# Patient Record
Sex: MALE | NOT HISPANIC OR LATINO | URBAN - METROPOLITAN AREA
[De-identification: names, ages, dates, MRNs, and addresses within clinical notes are randomized per-mention and may not be internally consistent; named-entity substitution may affect disease eponyms.]

---

## 2018-09-27 ENCOUNTER — INPATIENT (INPATIENT)
Facility: HOSPITAL | Age: 53
LOS: 6 days | Discharge: HOME HEALTH | DRG: 236 | End: 2018-10-04
Attending: THORACIC SURGERY (CARDIOTHORACIC VASCULAR SURGERY) | Admitting: THORACIC SURGERY (CARDIOTHORACIC VASCULAR SURGERY)
Payer: COMMERCIAL

## 2018-09-27 VITALS
WEIGHT: 164.24 LBS | HEART RATE: 92 BPM | RESPIRATION RATE: 18 BRPM | DIASTOLIC BLOOD PRESSURE: 74 MMHG | OXYGEN SATURATION: 99 % | SYSTOLIC BLOOD PRESSURE: 144 MMHG | HEIGHT: 66 IN

## 2018-09-27 DIAGNOSIS — E11.9 TYPE 2 DIABETES MELLITUS WITHOUT COMPLICATIONS: ICD-10-CM

## 2018-09-27 DIAGNOSIS — I10 ESSENTIAL (PRIMARY) HYPERTENSION: ICD-10-CM

## 2018-09-27 DIAGNOSIS — Z98.890 OTHER SPECIFIED POSTPROCEDURAL STATES: Chronic | ICD-10-CM

## 2018-09-27 DIAGNOSIS — J44.9 CHRONIC OBSTRUCTIVE PULMONARY DISEASE, UNSPECIFIED: ICD-10-CM

## 2018-09-27 DIAGNOSIS — I25.10 ATHEROSCLEROTIC HEART DISEASE OF NATIVE CORONARY ARTERY WITHOUT ANGINA PECTORIS: ICD-10-CM

## 2018-09-27 DIAGNOSIS — E78.5 HYPERLIPIDEMIA, UNSPECIFIED: ICD-10-CM

## 2018-09-27 LAB
ALBUMIN SERPL ELPH-MCNC: 3.7 G/DL — SIGNIFICANT CHANGE UP (ref 3.3–5)
ALP SERPL-CCNC: 73 U/L — SIGNIFICANT CHANGE UP (ref 40–120)
ALT FLD-CCNC: 13 U/L — SIGNIFICANT CHANGE UP (ref 10–45)
ANION GAP SERPL CALC-SCNC: 10 MMOL/L — SIGNIFICANT CHANGE UP (ref 5–17)
AST SERPL-CCNC: 10 U/L — SIGNIFICANT CHANGE UP (ref 10–40)
BILIRUB SERPL-MCNC: 0.3 MG/DL — SIGNIFICANT CHANGE UP (ref 0.2–1.2)
BUN SERPL-MCNC: 21 MG/DL — SIGNIFICANT CHANGE UP (ref 7–23)
CALCIUM SERPL-MCNC: 10.4 MG/DL — SIGNIFICANT CHANGE UP (ref 8.4–10.5)
CHLORIDE SERPL-SCNC: 94 MMOL/L — LOW (ref 96–108)
CK MB CFR SERPL CALC: 4.4 NG/ML — SIGNIFICANT CHANGE UP (ref 0–6.7)
CK SERPL-CCNC: 112 U/L — SIGNIFICANT CHANGE UP (ref 30–200)
CO2 SERPL-SCNC: 30 MMOL/L — SIGNIFICANT CHANGE UP (ref 22–31)
CREAT SERPL-MCNC: 1.05 MG/DL — SIGNIFICANT CHANGE UP (ref 0.5–1.3)
GLUCOSE SERPL-MCNC: 299 MG/DL — HIGH (ref 70–99)
HCT VFR BLD CALC: 44.5 % — SIGNIFICANT CHANGE UP (ref 39–50)
HGB BLD-MCNC: 14.4 G/DL — SIGNIFICANT CHANGE UP (ref 13–17)
MAGNESIUM SERPL-MCNC: 1.7 MG/DL — SIGNIFICANT CHANGE UP (ref 1.6–2.6)
MCHC RBC-ENTMCNC: 28.2 PG — SIGNIFICANT CHANGE UP (ref 27–34)
MCHC RBC-ENTMCNC: 32.4 G/DL — SIGNIFICANT CHANGE UP (ref 32–36)
MCV RBC AUTO: 87.3 FL — SIGNIFICANT CHANGE UP (ref 80–100)
NT-PROBNP SERPL-SCNC: 1423 PG/ML — HIGH (ref 0–300)
PLATELET # BLD AUTO: 339 K/UL — SIGNIFICANT CHANGE UP (ref 150–400)
POTASSIUM SERPL-MCNC: 4.1 MMOL/L — SIGNIFICANT CHANGE UP (ref 3.5–5.3)
POTASSIUM SERPL-SCNC: 4.1 MMOL/L — SIGNIFICANT CHANGE UP (ref 3.5–5.3)
PROT SERPL-MCNC: 7.5 G/DL — SIGNIFICANT CHANGE UP (ref 6–8.3)
RBC # BLD: 5.1 M/UL — SIGNIFICANT CHANGE UP (ref 4.2–5.8)
RBC # FLD: 12.9 % — SIGNIFICANT CHANGE UP (ref 10.3–16.9)
SODIUM SERPL-SCNC: 134 MMOL/L — LOW (ref 135–145)
WBC # BLD: 10.2 K/UL — SIGNIFICANT CHANGE UP (ref 3.8–10.5)
WBC # FLD AUTO: 10.2 K/UL — SIGNIFICANT CHANGE UP (ref 3.8–10.5)

## 2018-09-27 RX ORDER — DEXTROSE 50 % IN WATER 50 %
25 SYRINGE (ML) INTRAVENOUS ONCE
Qty: 0 | Refills: 0 | Status: DISCONTINUED | OUTPATIENT
Start: 2018-09-27 | End: 2018-10-01

## 2018-09-27 RX ORDER — INSULIN GLARGINE 100 [IU]/ML
20 INJECTION, SOLUTION SUBCUTANEOUS AT BEDTIME
Qty: 0 | Refills: 0 | Status: DISCONTINUED | OUTPATIENT
Start: 2018-09-27 | End: 2018-10-01

## 2018-09-27 RX ORDER — DEXTROSE 50 % IN WATER 50 %
12.5 SYRINGE (ML) INTRAVENOUS ONCE
Qty: 0 | Refills: 0 | Status: DISCONTINUED | OUTPATIENT
Start: 2018-09-27 | End: 2018-10-01

## 2018-09-27 RX ORDER — MAGNESIUM SULFATE 500 MG/ML
2 VIAL (ML) INJECTION ONCE
Qty: 0 | Refills: 0 | Status: COMPLETED | OUTPATIENT
Start: 2018-09-27 | End: 2018-09-28

## 2018-09-27 RX ORDER — BUDESONIDE AND FORMOTEROL FUMARATE DIHYDRATE 160; 4.5 UG/1; UG/1
2 AEROSOL RESPIRATORY (INHALATION)
Qty: 0 | Refills: 0 | Status: DISCONTINUED | OUTPATIENT
Start: 2018-09-27 | End: 2018-10-04

## 2018-09-27 RX ORDER — METOPROLOL TARTRATE 50 MG
25 TABLET ORAL
Qty: 0 | Refills: 0 | Status: DISCONTINUED | OUTPATIENT
Start: 2018-09-27 | End: 2018-10-01

## 2018-09-27 RX ORDER — INFLUENZA VIRUS VACCINE 15; 15; 15; 15 UG/.5ML; UG/.5ML; UG/.5ML; UG/.5ML
0.5 SUSPENSION INTRAMUSCULAR ONCE
Qty: 0 | Refills: 0 | Status: COMPLETED | OUTPATIENT
Start: 2018-09-27 | End: 2018-10-04

## 2018-09-27 RX ORDER — GLUCAGON INJECTION, SOLUTION 0.5 MG/.1ML
1 INJECTION, SOLUTION SUBCUTANEOUS ONCE
Qty: 0 | Refills: 0 | Status: DISCONTINUED | OUTPATIENT
Start: 2018-09-27 | End: 2018-10-01

## 2018-09-27 RX ORDER — ALBUTEROL 90 UG/1
2.5 AEROSOL, METERED ORAL EVERY 6 HOURS
Qty: 0 | Refills: 0 | Status: DISCONTINUED | OUTPATIENT
Start: 2018-09-27 | End: 2018-09-28

## 2018-09-27 RX ORDER — ALBUTEROL 90 UG/1
3 AEROSOL, METERED ORAL
Qty: 0 | Refills: 0 | COMMUNITY

## 2018-09-27 RX ORDER — SODIUM CHLORIDE 9 MG/ML
1000 INJECTION, SOLUTION INTRAVENOUS
Qty: 0 | Refills: 0 | Status: DISCONTINUED | OUTPATIENT
Start: 2018-09-27 | End: 2018-10-01

## 2018-09-27 RX ORDER — INSULIN LISPRO 100/ML
VIAL (ML) SUBCUTANEOUS
Qty: 0 | Refills: 0 | Status: DISCONTINUED | OUTPATIENT
Start: 2018-09-27 | End: 2018-10-01

## 2018-09-27 RX ORDER — EFINACONAZOLE 100 MG/ML
1 SOLUTION TOPICAL
Qty: 0 | Refills: 0 | COMMUNITY

## 2018-09-27 RX ORDER — HEPARIN SODIUM 5000 [USP'U]/ML
5000 INJECTION INTRAVENOUS; SUBCUTANEOUS EVERY 8 HOURS
Qty: 0 | Refills: 0 | Status: DISCONTINUED | OUTPATIENT
Start: 2018-09-27 | End: 2018-10-01

## 2018-09-27 RX ORDER — CLOPIDOGREL BISULFATE 75 MG/1
75 TABLET, FILM COATED ORAL DAILY
Qty: 0 | Refills: 0 | Status: DISCONTINUED | OUTPATIENT
Start: 2018-09-28 | End: 2018-09-28

## 2018-09-27 RX ORDER — ASPIRIN/CALCIUM CARB/MAGNESIUM 324 MG
81 TABLET ORAL DAILY
Qty: 0 | Refills: 0 | Status: DISCONTINUED | OUTPATIENT
Start: 2018-09-28 | End: 2018-10-01

## 2018-09-27 RX ORDER — INSULIN LISPRO 100/ML
3 VIAL (ML) SUBCUTANEOUS
Qty: 0 | Refills: 0 | Status: DISCONTINUED | OUTPATIENT
Start: 2018-09-27 | End: 2018-10-01

## 2018-09-27 RX ORDER — SODIUM CHLORIDE 9 MG/ML
1000 INJECTION INTRAMUSCULAR; INTRAVENOUS; SUBCUTANEOUS
Qty: 0 | Refills: 0 | Status: DISCONTINUED | OUTPATIENT
Start: 2018-09-28 | End: 2018-09-28

## 2018-09-27 RX ORDER — PREGABALIN 225 MG/1
1 CAPSULE ORAL
Qty: 0 | Refills: 0 | COMMUNITY

## 2018-09-27 RX ORDER — ATORVASTATIN CALCIUM 80 MG/1
40 TABLET, FILM COATED ORAL AT BEDTIME
Qty: 0 | Refills: 0 | Status: DISCONTINUED | OUTPATIENT
Start: 2018-09-27 | End: 2018-09-28

## 2018-09-27 RX ORDER — ERGOCALCIFEROL 1.25 MG/1
1 CAPSULE ORAL
Qty: 0 | Refills: 0 | COMMUNITY

## 2018-09-27 RX ORDER — DEXTROSE 50 % IN WATER 50 %
15 SYRINGE (ML) INTRAVENOUS ONCE
Qty: 0 | Refills: 0 | Status: DISCONTINUED | OUTPATIENT
Start: 2018-09-27 | End: 2018-10-01

## 2018-09-27 RX ADMIN — HEPARIN SODIUM 5000 UNIT(S): 5000 INJECTION INTRAVENOUS; SUBCUTANEOUS at 22:02

## 2018-09-27 RX ADMIN — Medication 25 MILLIGRAM(S): at 22:17

## 2018-09-27 RX ADMIN — Medication 4: at 22:02

## 2018-09-27 RX ADMIN — BUDESONIDE AND FORMOTEROL FUMARATE DIHYDRATE 2 PUFF(S): 160; 4.5 AEROSOL RESPIRATORY (INHALATION) at 22:17

## 2018-09-27 RX ADMIN — INSULIN GLARGINE 20 UNIT(S): 100 INJECTION, SOLUTION SUBCUTANEOUS at 22:15

## 2018-09-27 NOTE — H&P ADULT - NS MD HP PULSE CAROTID
The patient has been examined and the H&P has been reviewed:    I concur with the findings and no changes have occurred since H&P was written.    Anesthesia/Surgery risks, benefits and alternative options discussed and understood by patient/family.          Active Hospital Problems    Diagnosis  POA    Mass of thigh, right [R22.41]  Yes      Resolved Hospital Problems    Diagnosis Date Resolved POA   No resolved problems to display.      2+ b/l no bruits

## 2018-09-27 NOTE — H&P ADULT - RS GEN PE MLT RESP DETAILS PC
diminished breath sounds, R/respirations non-labored/wheezes/diminished breath sounds, L/no rhonchi/no rales

## 2018-09-27 NOTE — H&P ADULT - FAMILY HISTORY
Father  Still living? Unknown  Family history of acute myocardial infarction, Age at diagnosis: Age Unknown     Grandparent  Still living? Unknown  Family history of acute myocardial infarction, Age at diagnosis: Age Unknown     Sibling  Still living? Unknown  Family history of CABG, Age at diagnosis: Age Unknown

## 2018-09-27 NOTE — H&P ADULT - PMH
CAD (coronary artery disease)    COPD (chronic obstructive pulmonary disease)    Diabetes    HLD (hyperlipidemia)    HTN (hypertension)

## 2018-09-27 NOTE — H&P ADULT - PROBLEM SELECTOR PLAN 5
- SpO2 99% on RA on admission  - Continue Albuterol Neb Q6hr PRN for SOB/wheezing and Symbicort 2 puffs BID    DVT ppx: Heparin SubQ  Dispo: NPO for staged PCI in AM with Dr. Corrales

## 2018-09-27 NOTE — H&P ADULT - HISTORY OF PRESENT ILLNESS
52 y/o M with PMHx of COPD, Bronchiectasis, h/o MAC, HTN, DM, HTN who was initially referred to  for cardiac cath due to abnormal stress test, which as per transfer notes from , showed small amount of ischemia in the apical region. Pt is now s/p diagnostic cardiac cath at  on 9/27/18 revealing significant 3VCAD: ostial LAD: eccentric tubular 75% stenosis, prox LAD: eccentric tubular 40% stenosis, prox Ramus: eccentric tubular 70% stenosis, prox RCA: tubular 20% stenosis, mid RCA: eccentric and calcified tubular 95% stenosis for which attempted PCI was unsuccessful due to heavy calcification at the RCA ostium, severely elevated EDP, diaphragmatic and posterobasal hypokinesis, EF 45%. Pt was deemed medically stable for transfer to [facility] on 9/27/18 with plan for staged PCI on 9/28/18 with Dr. Corrales. As per d/w Dr. Corrales, pt was already loaded with ASA/Plavix prior to procedure at  and will receive ASA 81mg and Plavix 75mg on the morning of 9/28/18 prior to staged PCI.  Upon arrival to [facility], VS noted as 144/74, HR 92, RR 18, SpO2 99% on RA. 52 y/o M with strong FamHx of CAD/MI (4 grandparents  from [place], father  from [place], sister s/p CABG) and PMHx of COPD, Bronchiectasis, h/o MAC, HTN, DM, HTN who was initially referred to  for cardiac cath due to abnormal stress test on 18, which as per transfer notes from , showed small amount of ischemia in the apical region. He states his PCP, referred him for stress test (unsure why though per pt) which was abnormal prompting him to proceed with elective cath at . Pt is now s/p diagnostic cardiac cath at  on 18 revealing significant 3VCAD: ostial LAD: eccentric tubular 75% stenosis, prox LAD: eccentric tubular 40% stenosis, prox Ramus: eccentric tubular 70% stenosis, prox RCA: tubular 20% stenosis, mid RCA: eccentric and calcified tubular 95% stenosis for which attempted PCI was unsuccessful due to heavy calcification at the RCA ostium, severely elevated EDP, diaphragmatic and posterobasal hypokinesis, EF 45%. Pt was deemed medically stable for transfer to [facility] on 18 with plan for staged PCI on 18 with Dr. Corrales. Upon arrival to [facility], VS noted as 144/74, HR 92, RR 18, SpO2 99% on RA. EKG shows SR at 95BPM with RBBB (known), with no acute changes noted. On interview with Prasanth HOGAN, pt endorses CID with walking up 2 flights of stairs which he attributes to his COPD. Pt denies any HA, dizziness, syncope, CP, palpitations, SOB at rest, n/v/d, diaphoresis, dysuria, fevers, chills, melena, LE edema, or any other symptoms at this time. As per d/w Dr. Corrales, pt was already loaded with ASA/Plavix prior to procedure at  and will receive ASA 81mg and Plavix 75mg on the morning of 18 prior to staged PCI.

## 2018-09-27 NOTE — H&P ADULT - PROBLEM SELECTOR PLAN 4
- Holding home oral hypoglycemics  - Lantus 20 units at bedtime and Humalog 3 units TID with meals  - FS/ISS  - F/u HgbA1c in AM

## 2018-09-27 NOTE — H&P ADULT - ASSESSMENT
54 y/o M with PMHx of COPD, Bronchiectasis, h/o MAC, HTN, DM, HTN who was transferred from  for staged PCI on 9/28/18 with Dr. Corrales 54 y/o M with PMHx of COPD, Bronchiectasis, h/o MAC, HTN, DM, HTN who was transferred from  for staged PCI on 9/28/18 with Dr. Corrales. 54 y/o M with strong FamHx of CAD/MI (4 grandparents  from [place], father  from [place], sister s/p CABG) and PMHx of COPD, Bronchiectasis, h/o MAC, HTN, DM, HTN, with abnormal stress test, who is s/p diagnostic cardiac cath at  on 18 revealing significant 3VCAD with unsuccessful PCI due to heavy calcification at the RCA ostium, now transferred to [facility] for staged PCI on 18 with Dr. Corrales.

## 2018-09-27 NOTE — H&P ADULT - PROBLEM SELECTOR PLAN 2
- Pt transferred on Lipitor 20mg QD. Increased to Lipitor 40mg tonight.  - F/u Lipid Panel and LFTs in AM to possibly increase Lipitor to 80mg after cath.

## 2018-09-27 NOTE — H&P ADULT - PROBLEM SELECTOR PLAN 1
- CP free  - s/p diagnostic cath at  on 9/27/18: 3VCAD: ostial LAD: eccentric tubular 75% stenosis, prox LAD: eccentric tubular 40% stenosis, prox Ramus: eccentric tubular 70% stenosis, prox RCA: tubular 20% stenosis, mid RCA: eccentric and calcified tubular 95% stenosis for which attempted PCI was unsuccessful due to heavy calcification at the RCA ostium, severely elevated EDP, diaphragmatic and posterobasal hypokinesis, EF 45%  - EKG at [facility] shows SR at 95BPM with RBBB (known), with no acute changes noted  - Per d/w Dr. Corrales, pt was loaded with ASA/Plavix at . Ordered for ASA 81mg and Plavix 75mg in AM  - NPO at midnight for staged PCI in AM with Dr. Corrales  - Precath/consent in chart  - Risks & benefits of procedure and alternative therapy have been explained to the patient including but not limited to: allergic reaction, bleeding w/possible need for blood transfusion, infection, renal and vascular compromise, limb damage, arrhythmia, stroke, vessel dissection/perforation, Myocardial infarction, emergent CABG. Informed consent obtained and in chart.  - Echo ordered

## 2018-09-27 NOTE — H&P ADULT - SKIN
detailed exam scaling/Generalized hypo and hyperpigmentation of skin with scaling noted on back, hips, sacrum, groin, and legs (L>R)

## 2018-09-28 DIAGNOSIS — I50.20 UNSPECIFIED SYSTOLIC (CONGESTIVE) HEART FAILURE: ICD-10-CM

## 2018-09-28 PROBLEM — Z00.00 ENCOUNTER FOR PREVENTIVE HEALTH EXAMINATION: Status: ACTIVE | Noted: 2018-09-28

## 2018-09-28 LAB
ANION GAP SERPL CALC-SCNC: 9 MMOL/L — SIGNIFICANT CHANGE UP (ref 5–17)
APTT BLD: 30.5 SEC — SIGNIFICANT CHANGE UP (ref 27.5–37.4)
BASOPHILS NFR BLD AUTO: 0.2 % — SIGNIFICANT CHANGE UP (ref 0–2)
BUN SERPL-MCNC: 18 MG/DL — SIGNIFICANT CHANGE UP (ref 7–23)
CALCIUM SERPL-MCNC: 10.1 MG/DL — SIGNIFICANT CHANGE UP (ref 8.4–10.5)
CHLORIDE SERPL-SCNC: 94 MMOL/L — LOW (ref 96–108)
CHOLEST SERPL-MCNC: 124 MG/DL — SIGNIFICANT CHANGE UP (ref 10–199)
CK MB CFR SERPL CALC: 4.6 NG/ML — SIGNIFICANT CHANGE UP (ref 0–6.7)
CK SERPL-CCNC: 117 U/L — SIGNIFICANT CHANGE UP (ref 30–200)
CO2 SERPL-SCNC: 31 MMOL/L — SIGNIFICANT CHANGE UP (ref 22–31)
CREAT SERPL-MCNC: 0.83 MG/DL — SIGNIFICANT CHANGE UP (ref 0.5–1.3)
EOSINOPHIL NFR BLD AUTO: 2.6 % — SIGNIFICANT CHANGE UP (ref 0–6)
GLUCOSE SERPL-MCNC: 181 MG/DL — HIGH (ref 70–99)
HBA1C BLD-MCNC: 8.8 % — HIGH (ref 4–5.6)
HCT VFR BLD CALC: 47.1 % — SIGNIFICANT CHANGE UP (ref 39–50)
HDLC SERPL-MCNC: 58 MG/DL — SIGNIFICANT CHANGE UP
HGB BLD-MCNC: 15.3 G/DL — SIGNIFICANT CHANGE UP (ref 13–17)
INR BLD: 1.04 — SIGNIFICANT CHANGE UP (ref 0.88–1.16)
LIPID PNL WITH DIRECT LDL SERPL: 53 MG/DL — SIGNIFICANT CHANGE UP
LYMPHOCYTES # BLD AUTO: 14.4 % — SIGNIFICANT CHANGE UP (ref 13–44)
MAGNESIUM SERPL-MCNC: 2 MG/DL — SIGNIFICANT CHANGE UP (ref 1.6–2.6)
MCHC RBC-ENTMCNC: 28.8 PG — SIGNIFICANT CHANGE UP (ref 27–34)
MCHC RBC-ENTMCNC: 32.5 G/DL — SIGNIFICANT CHANGE UP (ref 32–36)
MCV RBC AUTO: 88.5 FL — SIGNIFICANT CHANGE UP (ref 80–100)
MONOCYTES NFR BLD AUTO: 6.5 % — SIGNIFICANT CHANGE UP (ref 2–14)
NEUTROPHILS NFR BLD AUTO: 76.3 % — SIGNIFICANT CHANGE UP (ref 43–77)
PLATELET # BLD AUTO: 349 K/UL — SIGNIFICANT CHANGE UP (ref 150–400)
POTASSIUM SERPL-MCNC: 4.2 MMOL/L — SIGNIFICANT CHANGE UP (ref 3.5–5.3)
POTASSIUM SERPL-SCNC: 4.2 MMOL/L — SIGNIFICANT CHANGE UP (ref 3.5–5.3)
PROTHROM AB SERPL-ACNC: 11.5 SEC — SIGNIFICANT CHANGE UP (ref 9.8–12.7)
RBC # BLD: 5.32 M/UL — SIGNIFICANT CHANGE UP (ref 4.2–5.8)
RBC # FLD: 12.8 % — SIGNIFICANT CHANGE UP (ref 10.3–16.9)
SODIUM SERPL-SCNC: 134 MMOL/L — LOW (ref 135–145)
T4 AB SER-ACNC: 7.57 UG/DL — SIGNIFICANT CHANGE UP (ref 3.17–11.72)
TOTAL CHOLESTEROL/HDL RATIO MEASUREMENT: 2.1 RATIO — LOW (ref 3.4–9.6)
TRIGL SERPL-MCNC: 66 MG/DL — SIGNIFICANT CHANGE UP (ref 10–149)
TSH SERPL-MCNC: 1.91 UIU/ML — SIGNIFICANT CHANGE UP (ref 0.35–4.94)
WBC # BLD: 10.6 K/UL — HIGH (ref 3.8–10.5)
WBC # FLD AUTO: 10.6 K/UL — HIGH (ref 3.8–10.5)

## 2018-09-28 PROCEDURE — 70450 CT HEAD/BRAIN W/O DYE: CPT | Mod: 26

## 2018-09-28 PROCEDURE — 93010 ELECTROCARDIOGRAM REPORT: CPT

## 2018-09-28 PROCEDURE — 93880 EXTRACRANIAL BILAT STUDY: CPT | Mod: 26

## 2018-09-28 PROCEDURE — 93306 TTE W/DOPPLER COMPLETE: CPT | Mod: 26

## 2018-09-28 PROCEDURE — 99233 SBSQ HOSP IP/OBS HIGH 50: CPT

## 2018-09-28 PROCEDURE — 99223 1ST HOSP IP/OBS HIGH 75: CPT

## 2018-09-28 PROCEDURE — 71046 X-RAY EXAM CHEST 2 VIEWS: CPT | Mod: 26

## 2018-09-28 RX ORDER — IPRATROPIUM/ALBUTEROL SULFATE 18-103MCG
3 AEROSOL WITH ADAPTER (GRAM) INHALATION EVERY 6 HOURS
Qty: 0 | Refills: 0 | Status: DISCONTINUED | OUTPATIENT
Start: 2018-09-28 | End: 2018-10-01

## 2018-09-28 RX ORDER — ATORVASTATIN CALCIUM 80 MG/1
80 TABLET, FILM COATED ORAL AT BEDTIME
Qty: 0 | Refills: 0 | Status: DISCONTINUED | OUTPATIENT
Start: 2018-09-28 | End: 2018-10-04

## 2018-09-28 RX ADMIN — Medication 3 MILLILITER(S): at 22:07

## 2018-09-28 RX ADMIN — Medication 81 MILLIGRAM(S): at 05:43

## 2018-09-28 RX ADMIN — HEPARIN SODIUM 5000 UNIT(S): 5000 INJECTION INTRAVENOUS; SUBCUTANEOUS at 22:07

## 2018-09-28 RX ADMIN — CLOPIDOGREL BISULFATE 75 MILLIGRAM(S): 75 TABLET, FILM COATED ORAL at 05:43

## 2018-09-28 RX ADMIN — Medication 3 UNIT(S): at 13:47

## 2018-09-28 RX ADMIN — Medication 4: at 13:48

## 2018-09-28 RX ADMIN — HEPARIN SODIUM 5000 UNIT(S): 5000 INJECTION INTRAVENOUS; SUBCUTANEOUS at 13:48

## 2018-09-28 RX ADMIN — Medication 3 MILLILITER(S): at 12:30

## 2018-09-28 RX ADMIN — Medication 25 MILLIGRAM(S): at 17:04

## 2018-09-28 RX ADMIN — Medication 1: at 16:47

## 2018-09-28 RX ADMIN — BUDESONIDE AND FORMOTEROL FUMARATE DIHYDRATE 2 PUFF(S): 160; 4.5 AEROSOL RESPIRATORY (INHALATION) at 17:04

## 2018-09-28 RX ADMIN — ATORVASTATIN CALCIUM 80 MILLIGRAM(S): 80 TABLET, FILM COATED ORAL at 22:08

## 2018-09-28 RX ADMIN — Medication 25 MILLIGRAM(S): at 05:44

## 2018-09-28 RX ADMIN — INSULIN GLARGINE 20 UNIT(S): 100 INJECTION, SOLUTION SUBCUTANEOUS at 22:08

## 2018-09-28 RX ADMIN — Medication 4: at 22:07

## 2018-09-28 RX ADMIN — Medication 3 MILLILITER(S): at 16:46

## 2018-09-28 RX ADMIN — Medication 5 MILLIGRAM(S): at 05:43

## 2018-09-28 RX ADMIN — Medication 1: at 07:06

## 2018-09-28 RX ADMIN — Medication 25 GRAM(S): at 01:05

## 2018-09-28 RX ADMIN — BUDESONIDE AND FORMOTEROL FUMARATE DIHYDRATE 2 PUFF(S): 160; 4.5 AEROSOL RESPIRATORY (INHALATION) at 05:43

## 2018-09-28 RX ADMIN — Medication 3 UNIT(S): at 16:47

## 2018-09-28 NOTE — PROGRESS NOTE ADULT - PROBLEM SELECTOR PLAN 5
- SpO2 99% on RA on admission  - Continue Albuterol Neb Q6hr PRN for SOB/wheezing and Symbicort 2 puffs BID    DVT ppx: Heparin SubQ  Dispo: NPO for staged PCI in AM with Dr. Corrales Holding home oral hypoglycemics  - Lantus 20 units at bedtime and Humalog 3 units TID with meals  - FS/ISS  - HgbA1C 8.8%  - Nutrition consult ordered

## 2018-09-28 NOTE — CONSULT NOTE ADULT - SUBJECTIVE AND OBJECTIVE BOX
Surgeon: Dr. Rudy Cristobal    Requesting Physician: Dr. Corrales    HISTORY OF PRESENT ILLNESS:  54 y/o M with strong FamHx of CAD/MI (4 grandparents  from MI, father  from [place], sister s/p CABG) and PMHx of COPD, Bronchiectasis, h/o MAC, HTN, HLD and IDDM who was initially referred to  for cardiac cath due to abnormal stress test on 18, which as per transfer notes from , showed small amount of ischemia in the apical region. He states his PCP, referred him for stress test (unsure why though per pt) which was abnormal prompting him to proceed with elective cath at . Pt is now s/p diagnostic cardiac cath at  on 18 revealing significant 3VCAD: ostial LAD: eccentric tubular 75% stenosis, prox LAD: eccentric tubular 40% stenosis, prox Ramus: eccentric tubular 70% stenosis, prox RCA: tubular 20% stenosis, mid RCA: eccentric and calcified tubular 95% stenosis for which attempted PCI was unsuccessful due to heavy calcification at the RCA ostium, severely elevated EDP, diaphragmatic and posterobasal hypokinesis, EF 45%. Pt was deemed medically stable for transfer to [facility] on 18 with plan for staged PCI on 18 with Dr. Corrales. Upon arrival to [facility], VS noted as 144/74, HR 92, RR 18, SpO2 99% on RA. EKG shows SR at 95BPM with RBBB (known), with no acute changes noted. On interview with patient, endorses CID with walking up 2-3 flights of stairs, but attributes this to COPD, in which he used nebulizers. Patient denies chest pain, SOB, palpitations, hemopytsis, N/V/D, abdominal pain, hematuria, melena, dizziness, LE edema, fever or chills.       PAST MEDICAL & SURGICAL HISTORY:  Diabetes  HLD (hyperlipidemia)  HTN (hypertension)  CAD (coronary artery disease)  COPD (chronic obstructive pulmonary disease)  H/O chest tube placement      MEDICATIONS  (STANDING):  ALBUTerol/ipratropium for Nebulization 3 milliLiter(s) Nebulizer every 6 hours  aspirin enteric coated 81 milliGRAM(s) Oral daily  atorvastatin 40 milliGRAM(s) Oral at bedtime  buDESOnide 160 MICROgram(s)/formoterol 4.5 MICROgram(s) Inhaler 2 Puff(s) Inhalation two times a day  clopidogrel Tablet 75 milliGRAM(s) Oral daily  dextrose 5%. 1000 milliLiter(s) (50 mL/Hr) IV Continuous <Continuous>  dextrose 50% Injectable 12.5 Gram(s) IV Push once  dextrose 50% Injectable 25 Gram(s) IV Push once  dextrose 50% Injectable 25 Gram(s) IV Push once  enalapril 5 milliGRAM(s) Oral daily  heparin  Injectable 5000 Unit(s) SubCutaneous every 8 hours  influenza   Vaccine 0.5 milliLiter(s) IntraMuscular once  insulin glargine Injectable (LANTUS) 20 Unit(s) SubCutaneous at bedtime  insulin lispro (HumaLOG) corrective regimen sliding scale   SubCutaneous Before meals and at bedtime  insulin lispro Injectable (HumaLOG) 3 Unit(s) SubCutaneous three times a day before meals  metoprolol tartrate 25 milliGRAM(s) Oral two times a day  sodium chloride 0.9%. 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  Patient is right -handed. Has strict diet, 3 meals per day, chicken with salad, vegetables.  Walks often with his job.     FAMILY HISTORY:  Family history of CABG (Sibling)  Family history of acute myocardial infarction (Father, Grandparent)      Review of Systems  CONSTITUTIONAL:  Fevers / chills, sweats, fatigue, weight loss, weight gain                                    NEGATIVE  NEURO:  parathesias, seizures, syncope, confusion                                                                               NEGATIVE  EYES:  Blurry vision, discharge, pain, loss of vision                                                                                  NEGATIVE  ENMT:  Difficulty hearing, vertigo, dysphagia, epistaxis, recent dental work                                     NEGATIVE  CV: +CID, denies Chest pain, palpitations, orthopnea                                                              POSITIVE  RESPIRATORY:  Wheezing, SOB, cough / sputum, hemoptysis                                                              NEGATIVE  GI:  Nausea, vomiting, diarrhea, constipation, melena                                                                        NEGATIVE  : Hematuria, dysuria, urgency, incontinence                                                                                       NEGATIVE  MUSKULOSKELETAL:  arthritis, joint swelling, muscle weakness                                                           NEGATIVE  SKIN/BREAST: +rash b/l LE (x2-3months, attributed to "allergies" per patient, PCP follows) denies itching, hair loss, masses    POSITIVE  PSYCH:  depression, anxiety, suicidal ideation                                                                                             NEGATIVE  HEME/LYMPH:  bruises easily, enlarged lymph nodes, tender lymph nodes                                        NEGATIVE  ENDOCRINE:  cold intolerance, heat intolerance, polydipsia                                                                   NEGATIVE    PHYSICAL EXAM  Vital Signs Last 24 Hrs  T(C): 36.3 (28 Sep 2018 10:32), Max: 36.7 (28 Sep 2018 01:00)  T(F): 97.4 (28 Sep 2018 10:32), Max: 98.1 (28 Sep 2018 01:00)  HR: 100 (28 Sep 2018 08:30) (92 - 104)  BP: 119/73 (28 Sep 2018 08:30) (119/73 - 144/74)  BP(mean): 97 (27 Sep 2018 20:41) (97 - 97)  RR: 18 (28 Sep 2018 08:30) (18 - 18)  SpO2: 95% (28 Sep 2018 08:30) (92% - 99%)      CONSTITUTIONAL: Appears stated age, normal affect, male lying comfortably in bed, in no acute distress.   NEURO: CN II-XII grossly intact, A&Ox3, no focal deficits.                 EYES: PERRLA, EOMI, no conjunctival injection  ENMT: Moist mucous membranes, no erythema, no lymphadenopathy, trachea midline.   CV: S1S2, RRR, no murmurs appreciated on exam.   RESPIRATORY: Rhonchi heard at left lower base, mild expiratory wheezes heard throughout, rales auscultated at right base.  GI: Abdomen soft, non tender, non distended, +bowel sounds.   : Deferred  MUSKULOSKELETAL: 5/5 strength b/l, good range of motion in all extremities, no swollen or erythematous joints.   SKIN / BREAST: B/l lower extremities with lichenification, scaling noted, L>R, seen also in groin, hands and back.   VASCULAR: Radial 2+ b/l, right radial site c/d/i, no hematoma, soft, femoral 2+ b/l, faint DP/PT pulses b/l.                                                       LABS:                        15.3   10.6  )-----------( 349      ( 28 Sep 2018 06:14 )             47.1         134<L>  |  94<L>  |  18  ----------------------------<  181<H>  4.2   |  31  |  0.83    Ca    10.1      28 Sep 2018 06:14  Mg     2.0         TPro  7.5  /  Alb  3.7  /  TBili  0.3  /  DBili  x   /  AST  10  /  ALT  13  /  AlkPhos  73      PT/INR - ( 28 Sep 2018 06:14 )   PT: 11.5 sec;   INR: 1.04          PTT - ( 28 Sep 2018 06:14 )  PTT:30.5 sec    CARDIAC MARKERS ( 28 Sep 2018 06:14 )  x     / x     / 117 U/L / x     / 4.6 ng/mL  CARDIAC MARKERS ( 27 Sep 2018 22:55 )  x     / x     / 112 U/L / x     / 4.4 ng/mL          RADIOLOGY & ADDITIONAL STUDIES:  CAROTID U/S:     CXR:     CT Scan:     EKG: < from: 12 Lead ECG (18 @ 08:13) >  **Preliminary Report**       Ventricular Rate 96 BPM    Atrial Rate 96 BPM    P-R Interval 182 ms    QRS Duration 140 ms    Q-T Interval 384 ms    QTC Calculation(Bezet) 485 ms    P Axis -19 degrees    R Axis -66 degrees    T Axis 56 degrees    Diagnosis Line Sinus rhythm with frequent premature ventricular complexes  Left axis deviation  Right bundle branch block  Inferior infarct , age undetermined  Abnormal ECG    < end of copied text >      TTE / KEMAL: < from: TTE Echo w/Cont Complete (18 @ 11:08) >  EXAM:  ECHOCARDIOGRAM W CONTRAST                          *** ADDENDUM 2018  ***    Patient Height: 167.6 cm  Patient Weight: 73.5 kg  Heart Rate: 87 bpm  Systolic Pressure: 109 mmHg  Diastolic Pressure: 71 mmHg  BSA: 1.8 m^2  Interpretation Summary  Normal left ventricular size and wall thickness.There is mild global   hypokinesis of the left ventricle.The left ventricular ejection fraction   is   mildly reduced.The left ventricular ejection fraction is 49%. The right   ventricle is normal in size and function.The left atrial size is   normal.The   mitral inflow pattern is consistent with impaired left ventricular   relaxation   with mildly elevated left atrial pressure (8-14mmHg).  Right atrial size   is   normal.No evidence for anyhemodynamically significant valvular   disease.There   was insufficient TR detected from which to calculate pulmonary artery   systolic   pressure.  There is no pericardial effusion.There is a 0.6 cm x 0.6 cm   echogenicity seen within the mid inferolateral wall, which likely   represents a   small fibroma vs. a calcification.    < end of copied text >      Cardiac Cath: 18 revealing significant 3VCAD: ostial LAD: eccentric tubular 75% stenosis, prox LAD: eccentric tubular 40% stenosis, prox Ramus: eccentric tubular 70% stenosis, prox RCA: tubular 20% stenosis, mid RCA: eccentric and calcified tubular 95% stenosis for which attempted PCI was unsuccessful due to heavy calcification at the RCA ostium, severely elevated EDP, diaphragmatic and posterobasal hypokinesis, EF 45% Surgeon: Dr. Rudy Cristobal    Requesting Physician: Dr. Corrales    HISTORY OF PRESENT ILLNESS:  52 y/o M with strong FamHx of CAD/MI (4 grandparents  from [place], father  from [place], sister s/p CABG) and PMHx of COPD, Bronchiectasis, h/o MAC, HTN, HLD and IDDM who was initially referred to  for cardiac cath due to abnormal stress test on 18, which as per transfer notes from , showed small amount of ischemia in the apical region. He states his PCP, referred him for stress test (unsure why though per pt) which was abnormal prompting him to proceed with elective cath at . Pt is now s/p diagnostic cardiac cath at  on 18 revealing significant 3VCAD: ostial LAD: eccentric tubular 75% stenosis, prox LAD: eccentric tubular 40% stenosis, prox Ramus: eccentric tubular 70% stenosis, prox RCA: tubular 20% stenosis, mid RCA: eccentric and calcified tubular 95% stenosis for which attempted PCI was unsuccessful due to heavy calcification at the RCA ostium, severely elevated EDP, diaphragmatic and posterobasal hypokinesis, EF 45%. Pt was deemed medically stable for transfer to [facility] on 18 with plan for staged PCI on 18 with Dr. Corrales. Upon arrival to [facility], VS noted as 144/74, HR 92, RR 18, SpO2 99% on RA. EKG shows SR at 95BPM with RBBB (known), with no acute changes noted. On interview with patient, endorses CID with walking up 2-3 flights of stairs, but attributes this to COPD, in which he used nebulizers. Patient denies chest pain, SOB, palpitations, hemopytsis, N/V/D, abdominal pain, hematuria, melena, dizziness, LE edema, fever or chills.       PAST MEDICAL & SURGICAL HISTORY:  Diabetes  HLD (hyperlipidemia)  HTN (hypertension)  CAD (coronary artery disease)  COPD (chronic obstructive pulmonary disease)  H/O chest tube placement      MEDICATIONS  (STANDING):  ALBUTerol/ipratropium for Nebulization 3 milliLiter(s) Nebulizer every 6 hours  aspirin enteric coated 81 milliGRAM(s) Oral daily  atorvastatin 40 milliGRAM(s) Oral at bedtime  buDESOnide 160 MICROgram(s)/formoterol 4.5 MICROgram(s) Inhaler 2 Puff(s) Inhalation two times a day  clopidogrel Tablet 75 milliGRAM(s) Oral daily  dextrose 5%. 1000 milliLiter(s) (50 mL/Hr) IV Continuous <Continuous>  dextrose 50% Injectable 12.5 Gram(s) IV Push once  dextrose 50% Injectable 25 Gram(s) IV Push once  dextrose 50% Injectable 25 Gram(s) IV Push once  enalapril 5 milliGRAM(s) Oral daily  heparin  Injectable 5000 Unit(s) SubCutaneous every 8 hours  influenza   Vaccine 0.5 milliLiter(s) IntraMuscular once  insulin glargine Injectable (LANTUS) 20 Unit(s) SubCutaneous at bedtime  insulin lispro (HumaLOG) corrective regimen sliding scale   SubCutaneous Before meals and at bedtime  insulin lispro Injectable (HumaLOG) 3 Unit(s) SubCutaneous three times a day before meals  metoprolol tartrate 25 milliGRAM(s) Oral two times a day  sodium chloride 0.9%. 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  Patient is right -handed. Has strict diet, 3 meals per day, chicken with salad, vegetables.  Walks often with his job.     FAMILY HISTORY:  Family history of CABG (Sibling)  Family history of acute myocardial infarction (Father, Grandparent)      Review of Systems  CONSTITUTIONAL:  Fevers / chills, sweats, fatigue, weight loss, weight gain                                    NEGATIVE  NEURO:  parathesias, seizures, syncope, confusion                                                                               NEGATIVE  EYES:  Blurry vision, discharge, pain, loss of vision                                                                                  NEGATIVE  ENMT:  Difficulty hearing, vertigo, dysphagia, epistaxis, recent dental work                                     NEGATIVE  CV: +CID, denies Chest pain, palpitations, orthopnea                                                              POSITIVE  RESPIRATORY:  Wheezing, SOB, cough / sputum, hemoptysis                                                              NEGATIVE  GI:  Nausea, vomiting, diarrhea, constipation, melena                                                                        NEGATIVE  : Hematuria, dysuria, urgency, incontinence                                                                                       NEGATIVE  MUSKULOSKELETAL:  arthritis, joint swelling, muscle weakness                                                           NEGATIVE  SKIN/BREAST: +rash b/l LE (x2-3months, attributed to "allergies" per patient, PCP follows) denies itching, hair loss, masses    POSITIVE  PSYCH:  depression, anxiety, suicidal ideation                                                                                             NEGATIVE  HEME/LYMPH:  bruises easily, enlarged lymph nodes, tender lymph nodes                                        NEGATIVE  ENDOCRINE:  cold intolerance, heat intolerance, polydipsia                                                                   NEGATIVE    PHYSICAL EXAM  Vital Signs Last 24 Hrs  T(C): 36.3 (28 Sep 2018 10:32), Max: 36.7 (28 Sep 2018 01:00)  T(F): 97.4 (28 Sep 2018 10:32), Max: 98.1 (28 Sep 2018 01:00)  HR: 100 (28 Sep 2018 08:30) (92 - 104)  BP: 119/73 (28 Sep 2018 08:30) (119/73 - 144/74)  BP(mean): 97 (27 Sep 2018 20:41) (97 - 97)  RR: 18 (28 Sep 2018 08:30) (18 - 18)  SpO2: 95% (28 Sep 2018 08:30) (92% - 99%)      CONSTITUTIONAL: Appears stated age, normal affect, male lying comfortably in bed, in no acute distress.   NEURO: CN II-XII grossly intact, A&Ox3, no focal deficits.                 EYES: PERRLA, EOMI, no conjunctival injection  ENMT: Moist mucous membranes, no erythema, no lymphadenopathy, trachea midline.   CV: S1S2, RRR, no murmurs appreciated on exam.   RESPIRATORY: Rhonchi heard at left lower base, mild expiratory wheezes heard throughout, rales auscultated at right base.  GI: Abdomen soft, non tender, non distended, +bowel sounds.   : Deferred  MUSKULOSKELETAL: 5/5 strength b/l, good range of motion in all extremities, no swollen or erythematous joints.   SKIN / BREAST: B/l lower extremities with lichenification, scaling noted, L>R, seen also in groin, hands and back.   VASCULAR: Radial 2+ b/l, right radial site c/d/i, no hematoma, soft, femoral 2+ b/l, faint DP/PT pulses b/l.                                                       LABS:                        15.3   10.6  )-----------( 349      ( 28 Sep 2018 06:14 )             47.1         134<L>  |  94<L>  |  18  ----------------------------<  181<H>  4.2   |  31  |  0.83    Ca    10.1      28 Sep 2018 06:14  Mg     2.0         TPro  7.5  /  Alb  3.7  /  TBili  0.3  /  DBili  x   /  AST  10  /  ALT  13  /  AlkPhos  73      PT/INR - ( 28 Sep 2018 06:14 )   PT: 11.5 sec;   INR: 1.04          PTT - ( 28 Sep 2018 06:14 )  PTT:30.5 sec    CARDIAC MARKERS ( 28 Sep 2018 06:14 )  x     / x     / 117 U/L / x     / 4.6 ng/mL  CARDIAC MARKERS ( 27 Sep 2018 22:55 )  x     / x     / 112 U/L / x     / 4.4 ng/mL          RADIOLOGY & ADDITIONAL STUDIES:  CAROTID U/S: pending    CXR: pending    CT Scan: pending    EKG: < from: 12 Lead ECG (18 @ 08:13) >  **Preliminary Report**       Ventricular Rate 96 BPM    Atrial Rate 96 BPM    P-R Interval 182 ms    QRS Duration 140 ms    Q-T Interval 384 ms    QTC Calculation(Bezet) 485 ms    P Axis -19 degrees    R Axis -66 degrees    T Axis 56 degrees    Diagnosis Line Sinus rhythm with frequent premature ventricular complexes  Left axis deviation  Right bundle branch block  Inferior infarct , age undetermined  Abnormal ECG    < end of copied text >      TTE / KEMAL: < from: TTE Echo w/Cont Complete (18 @ 11:08) >  EXAM:  ECHOCARDIOGRAM W CONTRAST                          *** ADDENDUM 2018  ***    Patient Height: 167.6 cm  Patient Weight: 73.5 kg  Heart Rate: 87 bpm  Systolic Pressure: 109 mmHg  Diastolic Pressure: 71 mmHg  BSA: 1.8 m^2  Interpretation Summary  Normal left ventricular size and wall thickness.There is mild global   hypokinesis of the left ventricle.The left ventricular ejection fraction   is   mildly reduced.The left ventricular ejection fraction is 49%. The right   ventricle is normal in size and function.The left atrial size is   normal.The   mitral inflow pattern is consistent with impaired left ventricular   relaxation   with mildly elevated left atrial pressure (8-14mmHg).  Right atrial size   is   normal.No evidence for anyhemodynamically significant valvular   disease.There   was insufficient TR detected from which to calculate pulmonary artery   systolic   pressure.  There is no pericardial effusion.There is a 0.6 cm x 0.6 cm   echogenicity seen within the mid inferolateral wall, which likely   represents a   small fibroma vs. a calcification.    < end of copied text >      Cardiac Cath: 18 revealing significant 3VCAD: ostial LAD: eccentric tubular 75% stenosis, prox LAD: eccentric tubular 40% stenosis, prox Ramus: eccentric tubular 70% stenosis, prox RCA: tubular 20% stenosis, mid RCA: eccentric and calcified tubular 95% stenosis for which attempted PCI was unsuccessful due to heavy calcification at the RCA ostium, severely elevated EDP, diaphragmatic and posterobasal hypokinesis, EF 45%

## 2018-09-28 NOTE — PROGRESS NOTE ADULT - PROBLEM SELECTOR PLAN 3
- /74  - Continue Metoprolol Tartrate 25mg PO BID and Enalapril 5mg PO QD Pt transferred on Lipitor 20mg QD.  - LDL 53  - Lipitor uptitrated to 80 mg PO QD

## 2018-09-28 NOTE — PROGRESS NOTE ADULT - PROBLEM SELECTOR PLAN 2
- Pt transferred on Lipitor 20mg QD. Increased to Lipitor 40mg tonight.  - F/u Lipid Panel and LFTs in AM to possibly increase Lipitor to 80mg after cath. No pedal edema, mild bibasilar crackles on exam, severely elevated EDP during diagnostic cath at   - ECHO 9/28 revealed LVEF mildly reduced at 49%, mitral inflow pattern c/w impaired LV relaxation  - f/u repeat CXR, chest CT to determine need for diuretics  - Core weights entered  - Nutrition consult  - Daily weights, strict I&Os, 1.5 L fluid restriction

## 2018-09-28 NOTE — PROGRESS NOTE ADULT - PROBLEM SELECTOR PLAN 1
Currently CP free and hemodynamically stable  - s/p diagnostic cath at  on 9/27/18: 3VCAD: ostial LAD: eccentric tubular 75% stenosis, prox LAD: eccentric tubular 40% stenosis, prox Ramus: eccentric tubular 70% stenosis, prox RCA: tubular 20% stenosis, mid RCA: eccentric and calcified tubular 95% stenosis for which attempted PCI was unsuccessful due to heavy calcification at the RCA ostium, severely elevated EDP, diaphragmatic and posterobasal hypokinesis, EF 45%  - EKG at [facility] admit revealed NSR at 95BPM with RBBB (known), with no acute changes noted  - Initially planned for staged PCI in with Dr. Corrales but now plan for MIDCAB with Dr. Cristobal on Monday. NPO after MN on Sunday. Currently CP free and hemodynamically stable  - s/p diagnostic cath at  on 9/27/18: 3VCAD: ostial LAD: eccentric tubular 75% stenosis, prox LAD: eccentric tubular 40% stenosis, prox Ramus: eccentric tubular 70% stenosis, prox RCA: tubular 20% stenosis, mid RCA: eccentric and calcified tubular 95% stenosis for which attempted PCI was unsuccessful due to heavy calcification at the RCA ostium, severely elevated EDP, diaphragmatic and posterobasal hypokinesis, EF 45%  - Initially planned for staged PCI in with Dr. Corrales but now plan for MIDCAB with Dr. Cristobal on Monday. NPO after MN on Sunday.  - EKG at [facility] admit revealed NSR at 95BPM with RBBB (known), with no acute changes noted  - ECHO revealed normal LV size and wall thickness,  mild global   hypokinesis of the LV, LVEF mildly reduced at 49%, mitral inflow pattern c/w impaired LV relaxation with mildly elevated LA, no evidence for any hemodynamically significant valvular disease, 0.6 cm x 0.6 cm echogenicity seen within the mid inferolateral wall, which likely represents a small fibroma vs. a calcification.  - US carotid duplex revealed no hemodynamically significant carotid artery stenosis, mild atherosclerotic plaque.  - CT head revealed no ICH or acute transcortical infarct, small vessel ischemic ischemic disease, inflammatory sinus disease.  - f/u CXR  - Ensure Type & Screen x 2,  pro-BNP, bedside spirometry, CT chest complete.   - CTS to obtain ABG  - Lipitor 40 mg PO QD increased to 80 mg PO QD  - c/w lopressor 25 mg BID

## 2018-09-28 NOTE — PROGRESS NOTE ADULT - ASSESSMENT
54 y/o M with strong FamHx of CAD/MI (4 grandparents  from [place], father  from [place], sister s/p CABG) and PMHx of COPD, Bronchiectasis, h/o MAC, HTN, DM, HTN, with abnormal stress test, who is s/p diagnostic cardiac cath at  on 18 revealing significant 3VCAD with unsuccessful PCI due to heavy calcification at the RCA ostium, who was transferred to [facility] with plan for MIDCAB on Monday 10/1/19 and then plan to return for PCI in 4 weeks after discharge. 52 y/o M with strong FamHx of CAD/MI (4 grandparents  from [place], father  from [place], sister s/p CABG) and PMHx of COPD, Bronchiectasis, h/o MAC, HTN, DM, HTN, HFrEF (EF 49%) with abnormal stress test, who is s/p diagnostic cardiac cath at  on 18 revealing significant 3VCAD with unsuccessful PCI due to heavy calcification at the RCA ostium, who was transferred to [facility] with plan for MIDCAB on Monday 10/1/19 and then plan to return for PCI in 4 weeks after discharge.

## 2018-09-28 NOTE — PROGRESS NOTE ADULT - PROBLEM SELECTOR PLAN 4
- Holding home oral hypoglycemics  - Lantus 20 units at bedtime and Humalog 3 units TID with meals  - FS/ISS  - F/u HgbA1c in AM SBP 120s-140s  - Continue Metoprolol Tartrate 25mg PO BID  - Hold home enalapril 5mg PO QD prevent perioperative vasodilation  - If needed, titrate up beta blocker or can start calcium channel blocker.  - Continue to monitor

## 2018-09-28 NOTE — CONSULT NOTE ADULT - PROBLEM SELECTOR RECOMMENDATION 9
- Patient has significant CAD, and will be evaluated for MIDCAB. Dr. Cristobal to review case and see patient later.  - Admit to telemetry floor.   - Please ensure the following studies are completed prior to OR date which is likely Monday 10/1/18: CBC, CMP, Coags, Lipid Panel, TSH, Hemoglobin A1c, Pro-BNP, Cardiac Enzymes, Type & Screen x 2,  Room air ABG, UA, Carotid US, TTE, CXR Pa/Lat, EKG, Bedside spirometry.   - Continue care per primary team. - Patient has significant CAD, and is evaluated for MIDCAB procedure with staged PCI.   - Will go for MIDCAB on Monday 10/1/18, and then will return for PCI in 4 weeks after discharge.    - Dr. Cristobal to review case and see patient later.  - Admit to telemetry floor.   - Please ensure the following studies are completed prior to OR date which is likely Monday 10/1/18: CBC, CMP, Coags, Lipid Panel, TSH, Hemoglobin A1c, Pro-BNP, Cardiac Enzymes, Type & Screen x 2,  Room air ABG, UA, Carotid US, TTE, CXR Pa/Lat, EKG, Bedside spirometry.   - Continue care per primary team. - Patient has significant CAD, and is evaluated for MIDCAB procedure with staged PCI.   - Will go for MIDCAB on Monday 10/1/18, and then will return for PCI in 4 weeks after discharge.    - Dr. Cristobal to review case and see patient later.  - Admit to telemetry floor.   - Please ensure the following studies are completed prior to OR date which is likely Monday 10/1/18: CBC, CMP, Coags, Lipid Panel, TSH, Hemoglobin A1c, Pro-BNP, Cardiac Enzymes, Type & Screen x 2,  Room air ABG, UA, Carotid US, TTE, CXR Pa/Lat, EKG, Bedside spirometry.   - Please obtain CT chest noncon 2/2 surgical history of intervention for pleural effusion  - Continue care per primary team.

## 2018-09-28 NOTE — PROGRESS NOTE ADULT - SUBJECTIVE AND OBJECTIVE BOX
ROS: This morning the patient denies cardiopulmonary symptoms. Denies CP, SOB, CID, LH, dizziness, palpitations. He reports dry cough. States he has COPD and uses inhalers at home and is not sure if he is receiving his treatments here in the hospital.      Vital Signs Last 24 Hrs  T(C): 36.3 (28 Sep 2018 10:32), Max: 36.7 (28 Sep 2018 01:00)  T(F): 97.4 (28 Sep 2018 10:32), Max: 98.1 (28 Sep 2018 01:00)  HR: 100 (28 Sep 2018 08:30) (92 - 104)  BP: 119/73 (28 Sep 2018 08:30) (119/73 - 144/74)  BP(mean): 97 (27 Sep 2018 20:41) (97 - 97)  RR: 18 (28 Sep 2018 08:30) (18 - 18)  SpO2: 95% (28 Sep 2018 08:30) (92% - 99%)    Physical Exam notable for: middle aged male laying flat in bed in NAD, flat neck veins, RRR, no MGR detected, mild expiratory wheezing throughout, dry cough, no crackles, soft abdomen, NTTP, +BS, no pretibial pitting edema, no ankle edema, skin WWP, A&Ox3      MEDICATIONS  (STANDING):  ALBUTerol/ipratropium for Nebulization 3 milliLiter(s) Nebulizer every 6 hours  aspirin enteric coated 81 milliGRAM(s) Oral daily  atorvastatin 40 milliGRAM(s) Oral at bedtime  buDESOnide 160 MICROgram(s)/formoterol 4.5 MICROgram(s) Inhaler 2 Puff(s) Inhalation two times a day  clopidogrel Tablet 75 milliGRAM(s) Oral daily  dextrose 5%. 1000 milliLiter(s) (50 mL/Hr) IV Continuous <Continuous>  dextrose 50% Injectable 12.5 Gram(s) IV Push once  dextrose 50% Injectable 25 Gram(s) IV Push once  dextrose 50% Injectable 25 Gram(s) IV Push once  enalapril 5 milliGRAM(s) Oral daily  heparin  Injectable 5000 Unit(s) SubCutaneous every 8 hours  influenza   Vaccine 0.5 milliLiter(s) IntraMuscular once  insulin glargine Injectable (LANTUS) 20 Unit(s) SubCutaneous at bedtime  insulin lispro (HumaLOG) corrective regimen sliding scale   SubCutaneous Before meals and at bedtime  insulin lispro Injectable (HumaLOG) 3 Unit(s) SubCutaneous three times a day before meals  metoprolol tartrate 25 milliGRAM(s) Oral two times a day  sodium chloride 0.9%. 1000 milliLiter(s) (75 mL/Hr) IV Continuous <Continuous>    MEDICATIONS  (PRN):  dextrose 40% Gel 15 Gram(s) Oral once PRN Blood Glucose LESS THAN 70 milliGRAM(s)/deciliter  glucagon  Injectable 1 milliGRAM(s) IntraMuscular once PRN Glucose LESS THAN 70 milligrams/deciliter      LABS:                        15.3   10.6  )-----------( 349      ( 28 Sep 2018 06:14 )             47.1     09-28    134<L>  |  94<L>  |  18  ----------------------------<  181<H>  4.2   |  31  |  0.83    Ca    10.1      28 Sep 2018 06:14  Mg     2.0     09-28    TPro  7.5  /  Alb  3.7  /  TBili  0.3  /  DBili  x   /  AST  10  /  ALT  13  /  AlkPhos  73  09-27    CARDIAC MARKERS ( 28 Sep 2018 06:14 )  x     / x     / 117 U/L / x     / 4.6 ng/mL  CARDIAC MARKERS ( 27 Sep 2018 22:55 )  x     / x     / 112 U/L / x     / 4.4 ng/mL      PT/INR - ( 28 Sep 2018 06:14 )   PT: 11.5 sec;   INR: 1.04          PTT - ( 28 Sep 2018 06:14 )  PTT:30.5 sec    I&O's Summary    27 Sep 2018 07:01  -  28 Sep 2018 07:00  --------------------------------------------------------  IN: 0 mL / OUT: 300 mL / NET: -300 mL    28 Sep 2018 07:01  -  28 Sep 2018 12:18  --------------------------------------------------------  IN: 240 mL / OUT: 350 mL / NET: -110 mL      BNPSerum Pro-Brain Natriuretic Peptide: 1423 pg/mL (09-27 @ 22:55)    RADIOLOGY & ADDITIONAL STUDIES:  TTE 9/28/18: Normal left ventricular size and wall thickness. There is a 0.6 cm x 0.6 cm echogenicity seen within the mid inferolateral wall, which likely represents a small fibroma vs. a calcification. There is mild global hypokinesis of the left ventricle. The left ventricular ejection fraction is mildly reduced.  The left ventricular ejection fraction is 49%. The left atrial size is normal. The mitral inflow pattern is consistent with impaired left ventricular relaxation with mildly elevated left atrial pressure (8-14mmHg). Right atrial size is normal. The right ventricle is normal in size and function. The tricuspid annular plane systolic excursion (TAPSE) is 18 mm (normal  17mm or higher). Structurally normal aortic valve. No aortic regurgitation noted. Structurally normal mitral valve. No mitral regurgitation noted. Structurally normal tricuspid valve. No tricuspid regurgitation noted. There was insufficient TR detected from which to calculate pulmonary  artery systolic pressure. Structurally normal pulmonic valve. No pulmonic regurgitation noted. No aortic root dilatation. The inferior vena cava is normal in size (<2.1 cm) with normal inspiratory collapse (>50%) consistent with normal right atrial pressure. There is no pericardial effusion.      IMPRESSION AND PLAN: 53M with history of Essential HTN, Type II DM, COPD and Bronchiectasis with h/o MAC, presented 9/27/18 as transfer from [facility] in context of abnormal stress test s/p diagnostic LHC 9/27 at  notable for 3VCAD with unsuccessful PCI due to heavily calcified oRCA now transferred to [facility] for PCI with  attending Dr. Corrales.    s/p DAPT load  Cont ASA 81, Plavix 75  Atova 40 qHS  -->augment to Atorva 80 post PCI  Enalapril 5, Metoprolol 25 BID  Cont home Symbicort  Nebulizers q6hr standing given wheezing on exam  Echo complete as above, LVEF 49%, no intracardiac thrombus - calcification vs fibroma  Anticipate discharge within next 24-48hours pending findings from cath today and pending intervention     [facility]  Cardiology

## 2018-09-28 NOTE — PROGRESS NOTE ADULT - SUBJECTIVE AND OBJECTIVE BOX
Interventional Cardiology PA Adult Progress Note    CC: abnormal NST upon admit    Subjective Assessment:  Pt seen and examined at bedside. Pt endorses intermittent SOB on exertion. Denies SOB at rest. Pt reports he has had a rash to his right groin for 2-3 months that was related to an allergy but he is unsure as to what the allergy was.  Denies CP, dizziness, palpitations, N/V, abdominal pain. All other 12 point review of systems otherwise negative except per subjective.   	  MEDICATIONS:  enalapril 5 milliGRAM(s) Oral daily  metoprolol tartrate 25 milliGRAM(s) Oral two times a day  ALBUTerol/ipratropium for Nebulization 3 milliLiter(s) Nebulizer every 6 hours  buDESOnide 160 MICROgram(s)/formoterol 4.5 MICROgram(s) Inhaler 2 Puff(s) Inhalation two times a day  atorvastatin 40 milliGRAM(s) Oral at bedtime  dextrose 40% Gel 15 Gram(s) Oral once PRN  dextrose 50% Injectable 12.5 Gram(s) IV Push once  dextrose 50% Injectable 25 Gram(s) IV Push once  dextrose 50% Injectable 25 Gram(s) IV Push once  glucagon  Injectable 1 milliGRAM(s) IntraMuscular once PRN  insulin glargine Injectable (LANTUS) 20 Unit(s) SubCutaneous at bedtime  insulin lispro (HumaLOG) corrective regimen sliding scale   SubCutaneous Before meals and at bedtime  insulin lispro Injectable (HumaLOG) 3 Unit(s) SubCutaneous three times a day before meals  aspirin enteric coated 81 milliGRAM(s) Oral daily  clopidogrel Tablet 75 milliGRAM(s) Oral daily  dextrose 5%. 1000 milliLiter(s) IV Continuous <Continuous>  heparin  Injectable 5000 Unit(s) SubCutaneous every 8 hours  influenza   Vaccine 0.5 milliLiter(s) IntraMuscular once  sodium chloride 0.9%. 1000 milliLiter(s) IV Continuous <Continuous>      [PHYSICAL EXAM:  TELEMETRY:  T(C): 36.6 (09-28-18 @ 14:24), Max: 36.7 (09-28-18 @ 01:00)  HR: 82 (09-28-18 @ 16:52) (82 - 104)  BP: 119/78 (09-28-18 @ 16:52) (119/73 - 144/74)  RR: 18 (09-28-18 @ 16:52) (18 - 18)  SpO2: 95% (09-28-18 @ 08:30) (92% - 99%)  Wt(kg): --  I&O's Summary    27 Sep 2018 07:01  -  28 Sep 2018 07:00  --------------------------------------------------------  IN: 0 mL / OUT: 300 mL / NET: -300 mL    28 Sep 2018 07:01  -  28 Sep 2018 17:10  --------------------------------------------------------  IN: 420 mL / OUT: 350 mL / NET: 70 mL      Height (cm): 167.64 (09-27 @ 20:35)  Weight (kg): 74.5 (09-27 @ 20:35)  BMI (kg/m2): 26.5 (09-27 @ 20:35)  BSA (m2): 1.84 (09-27 @ 20:35)    Appearance: WD/WN laying in hospital bed in NAD	  HEENT:   Normal oral mucosa, EOMI	  Neck: Supple,- JVD  Cardiovascular: Normal S1 S2, No murmurs,   Respiratory: Scattered expiratory wheezing, mild bibasilar crackles  Gastrointestinal:  Soft, Non-tender, + BS	  Skin: R groin with purple discoloration skilled nursing between belly button and pubic to penis to midpoint of right thigh. No discoloration to back. No hematoma auscultated. Dry, scaly B/L shins (L>R)  Extremities: Normal range of motion, No clubbing, cyanosis or edema  Vascular: Faint DP/PT B/L, 2+ radial B/L  Neurologic: Non-focal  Psychiatry: A & O x 3, Mood & affect appropriate                          15.3   10.6  )-----------( 349      ( 28 Sep 2018 06:14 )             47.1     09-28    134<L>  |  94<L>  |  18  ----------------------------<  181<H>  4.2   |  31  |  0.83    Ca    10.1      28 Sep 2018 06:14  Mg     2.0     09-28    TPro  7.5  /  Alb  3.7  /  TBili  0.3  /  DBili  x   /  AST  10  /  ALT  13  /  AlkPhos  73  09-27    proBNP: Serum Pro-Brain Natriuretic Peptide: 1423 pg/mL (09-27 @ 22:55)  HgA1c: Hemoglobin A1C, Whole Blood: 8.8 % (09-28 @ 06:14)  TSH: Thyroid Stimulating Hormone, Serum: 1.914 uIU/mL (09-27 @ 22:55)  PT/INR - ( 28 Sep 2018 06:14 )   PT: 11.5 sec;   INR: 1.04     PTT - ( 28 Sep 2018 06:14 )  PTT:30.5 sec

## 2018-09-29 LAB
ALBUMIN SERPL ELPH-MCNC: 3.7 G/DL — SIGNIFICANT CHANGE UP (ref 3.3–5)
ALP SERPL-CCNC: 70 U/L — SIGNIFICANT CHANGE UP (ref 40–120)
ALT FLD-CCNC: 13 U/L — SIGNIFICANT CHANGE UP (ref 10–45)
ANION GAP SERPL CALC-SCNC: 11 MMOL/L — SIGNIFICANT CHANGE UP (ref 5–17)
APPEARANCE UR: CLEAR — SIGNIFICANT CHANGE UP
APTT BLD: 31.9 SEC — SIGNIFICANT CHANGE UP (ref 27.5–37.4)
AST SERPL-CCNC: 12 U/L — SIGNIFICANT CHANGE UP (ref 10–40)
BILIRUB SERPL-MCNC: 0.4 MG/DL — SIGNIFICANT CHANGE UP (ref 0.2–1.2)
BILIRUB UR-MCNC: NEGATIVE — SIGNIFICANT CHANGE UP
BUN SERPL-MCNC: 24 MG/DL — HIGH (ref 7–23)
CALCIUM SERPL-MCNC: 10.2 MG/DL — SIGNIFICANT CHANGE UP (ref 8.4–10.5)
CHLORIDE SERPL-SCNC: 96 MMOL/L — SIGNIFICANT CHANGE UP (ref 96–108)
CO2 SERPL-SCNC: 29 MMOL/L — SIGNIFICANT CHANGE UP (ref 22–31)
COLOR SPEC: YELLOW — SIGNIFICANT CHANGE UP
CREAT SERPL-MCNC: 0.86 MG/DL — SIGNIFICANT CHANGE UP (ref 0.5–1.3)
DIFF PNL FLD: ABNORMAL
GLUCOSE SERPL-MCNC: 127 MG/DL — HIGH (ref 70–99)
GLUCOSE UR QL: 100
HCT VFR BLD CALC: 48.5 % — SIGNIFICANT CHANGE UP (ref 39–50)
HGB BLD-MCNC: 15.8 G/DL — SIGNIFICANT CHANGE UP (ref 13–17)
INR BLD: 1.05 — SIGNIFICANT CHANGE UP (ref 0.88–1.16)
KETONES UR-MCNC: NEGATIVE — SIGNIFICANT CHANGE UP
LEUKOCYTE ESTERASE UR-ACNC: ABNORMAL
MAGNESIUM SERPL-MCNC: 1.9 MG/DL — SIGNIFICANT CHANGE UP (ref 1.6–2.6)
MCHC RBC-ENTMCNC: 28.9 PG — SIGNIFICANT CHANGE UP (ref 27–34)
MCHC RBC-ENTMCNC: 32.6 G/DL — SIGNIFICANT CHANGE UP (ref 32–36)
MCV RBC AUTO: 88.7 FL — SIGNIFICANT CHANGE UP (ref 80–100)
NITRITE UR-MCNC: NEGATIVE — SIGNIFICANT CHANGE UP
NT-PROBNP SERPL-SCNC: 949 PG/ML — HIGH (ref 0–300)
PH UR: 5.5 — SIGNIFICANT CHANGE UP (ref 5–8)
PLATELET # BLD AUTO: 329 K/UL — SIGNIFICANT CHANGE UP (ref 150–400)
POTASSIUM SERPL-MCNC: 4.4 MMOL/L — SIGNIFICANT CHANGE UP (ref 3.5–5.3)
POTASSIUM SERPL-SCNC: 4.4 MMOL/L — SIGNIFICANT CHANGE UP (ref 3.5–5.3)
PROT SERPL-MCNC: 7.8 G/DL — SIGNIFICANT CHANGE UP (ref 6–8.3)
PROT UR-MCNC: 30 MG/DL
PROTHROM AB SERPL-ACNC: 11.7 SEC — SIGNIFICANT CHANGE UP (ref 9.8–12.7)
RBC # BLD: 5.47 M/UL — SIGNIFICANT CHANGE UP (ref 4.2–5.8)
RBC # FLD: 12.7 % — SIGNIFICANT CHANGE UP (ref 10.3–16.9)
SODIUM SERPL-SCNC: 136 MMOL/L — SIGNIFICANT CHANGE UP (ref 135–145)
SP GR SPEC: 1.02 — SIGNIFICANT CHANGE UP (ref 1–1.03)
T3 SERPL-MCNC: 129 NG/DL — SIGNIFICANT CHANGE UP (ref 80–200)
UROBILINOGEN FLD QL: 0.2 E.U./DL — SIGNIFICANT CHANGE UP
WBC # BLD: 10.3 K/UL — SIGNIFICANT CHANGE UP (ref 3.8–10.5)
WBC # FLD AUTO: 10.3 K/UL — SIGNIFICANT CHANGE UP (ref 3.8–10.5)

## 2018-09-29 PROCEDURE — 71250 CT THORAX DX C-: CPT | Mod: 26

## 2018-09-29 PROCEDURE — 93010 ELECTROCARDIOGRAM REPORT: CPT

## 2018-09-29 RX ORDER — FUROSEMIDE 40 MG
40 TABLET ORAL ONCE
Qty: 0 | Refills: 0 | Status: COMPLETED | OUTPATIENT
Start: 2018-09-29 | End: 2018-09-29

## 2018-09-29 RX ORDER — MAGNESIUM OXIDE 400 MG ORAL TABLET 241.3 MG
400 TABLET ORAL ONCE
Qty: 0 | Refills: 0 | Status: COMPLETED | OUTPATIENT
Start: 2018-09-29 | End: 2018-09-29

## 2018-09-29 RX ADMIN — Medication 25 MILLIGRAM(S): at 17:11

## 2018-09-29 RX ADMIN — Medication 3: at 11:25

## 2018-09-29 RX ADMIN — INSULIN GLARGINE 20 UNIT(S): 100 INJECTION, SOLUTION SUBCUTANEOUS at 22:05

## 2018-09-29 RX ADMIN — BUDESONIDE AND FORMOTEROL FUMARATE DIHYDRATE 2 PUFF(S): 160; 4.5 AEROSOL RESPIRATORY (INHALATION) at 17:11

## 2018-09-29 RX ADMIN — Medication 3 UNIT(S): at 07:38

## 2018-09-29 RX ADMIN — Medication 40 MILLIGRAM(S): at 17:11

## 2018-09-29 RX ADMIN — Medication 3 MILLILITER(S): at 22:05

## 2018-09-29 RX ADMIN — HEPARIN SODIUM 5000 UNIT(S): 5000 INJECTION INTRAVENOUS; SUBCUTANEOUS at 05:41

## 2018-09-29 RX ADMIN — Medication 3: at 16:58

## 2018-09-29 RX ADMIN — Medication 3 MILLILITER(S): at 11:25

## 2018-09-29 RX ADMIN — ATORVASTATIN CALCIUM 80 MILLIGRAM(S): 80 TABLET, FILM COATED ORAL at 22:05

## 2018-09-29 RX ADMIN — HEPARIN SODIUM 5000 UNIT(S): 5000 INJECTION INTRAVENOUS; SUBCUTANEOUS at 14:31

## 2018-09-29 RX ADMIN — Medication 3 MILLILITER(S): at 17:11

## 2018-09-29 RX ADMIN — Medication 3 UNIT(S): at 16:57

## 2018-09-29 RX ADMIN — BUDESONIDE AND FORMOTEROL FUMARATE DIHYDRATE 2 PUFF(S): 160; 4.5 AEROSOL RESPIRATORY (INHALATION) at 05:41

## 2018-09-29 RX ADMIN — Medication 40 MILLIGRAM(S): at 11:25

## 2018-09-29 RX ADMIN — Medication 25 MILLIGRAM(S): at 05:41

## 2018-09-29 RX ADMIN — HEPARIN SODIUM 5000 UNIT(S): 5000 INJECTION INTRAVENOUS; SUBCUTANEOUS at 22:05

## 2018-09-29 RX ADMIN — Medication 3: at 22:05

## 2018-09-29 RX ADMIN — Medication 3 UNIT(S): at 11:25

## 2018-09-29 RX ADMIN — Medication 81 MILLIGRAM(S): at 11:25

## 2018-09-29 RX ADMIN — MAGNESIUM OXIDE 400 MG ORAL TABLET 400 MILLIGRAM(S): 241.3 TABLET ORAL at 07:39

## 2018-09-29 NOTE — PROGRESS NOTE ADULT - SUBJECTIVE AND OBJECTIVE BOX
Interventional Cardiology PA Adult Progress Note    CC: abnormal NST upon admit    Subjective Assessment:  Pt seen and examined at bedside. Feels better about plan now that Dr. Corrales spoke with him. Denies CP, SOB, dizziness, diaphoresis, N/V, abdominal pain. All other 12 point review of systems otherwise negative except per subjective.  	  MEDICATIONS:  metoprolol tartrate 25 milliGRAM(s) Oral two times a day  ALBUTerol/ipratropium for Nebulization 3 milliLiter(s) Nebulizer every 6 hours  buDESOnide 160 MICROgram(s)/formoterol 4.5 MICROgram(s) Inhaler 2 Puff(s) Inhalation two times a day  atorvastatin 80 milliGRAM(s) Oral at bedtime  dextrose 40% Gel 15 Gram(s) Oral once PRN  dextrose 50% Injectable 12.5 Gram(s) IV Push once  dextrose 50% Injectable 25 Gram(s) IV Push once  dextrose 50% Injectable 25 Gram(s) IV Push once  glucagon  Injectable 1 milliGRAM(s) IntraMuscular once PRN  insulin glargine Injectable (LANTUS) 20 Unit(s) SubCutaneous at bedtime  insulin lispro (HumaLOG) corrective regimen sliding scale   SubCutaneous Before meals and at bedtime  insulin lispro Injectable (HumaLOG) 3 Unit(s) SubCutaneous three times a day before meals  aspirin enteric coated 81 milliGRAM(s) Oral daily  dextrose 5%. 1000 milliLiter(s) IV Continuous <Continuous>  heparin  Injectable 5000 Unit(s) SubCutaneous every 8 hours  influenza   Vaccine 0.5 milliLiter(s) IntraMuscular once      [PHYSICAL EXAM:  TELEMETRY:  T(C): 36.4 (09-29-18 @ 14:22), Max: 36.6 (09-29-18 @ 10:12)  HR: 96 (09-29-18 @ 11:29) (82 - 100)  BP: 142/66 (09-29-18 @ 11:29) (101/58 - 142/66)  RR: 18 (09-29-18 @ 11:29) (18 - 18)  SpO2: 95% (09-28-18 @ 20:18) (95% - 95%)  Wt(kg): --  I&O's Summary    28 Sep 2018 07:01  -  29 Sep 2018 07:00  --------------------------------------------------------  IN: 420 mL / OUT: 350 mL / NET: 70 mL    29 Sep 2018 07:01  -  29 Sep 2018 16:34  --------------------------------------------------------  IN: 180 mL / OUT: 700 mL / NET: -520 mL    Appearance: WD/WN	  HEENT:   Normal oral mucosa, EOMI	  Neck: Supple,  - JVD  Cardiovascular: Normal S1 S2,  No murmurs,   Respiratory: crackles anteriorly B/L, LLL with crackles auscultated posteriorly  Gastrointestinal:  Soft, Non-tender, + BS	  Skin: R groin with purple discoloration custodial between belly button and pubic to penis to midpoint of right thigh. No discoloration to back. No hematoma auscultated. Dry, scaly B/L shins (L>R)  Extremities: Normal range of motion, No clubbing, cyanosis or edema  Vascular: Faint DP/PT B/L, 2+ radial B/L  Neurologic: Non-focal  Psychiatry: A & O x 3, Mood & affect appropriate                          15.8   10.3  )-----------( 329      ( 29 Sep 2018 05:47 )             48.5     09-29    136  |  96  |  24<H>  ----------------------------<  127<H>  4.4   |  29  |  0.86    Ca    10.2      29 Sep 2018 05:47  Mg     1.9     09-29    TPro  7.8  /  Alb  3.7  /  TBili  0.4  /  DBili  x   /  AST  12  /  ALT  13  /  AlkPhos  70  09-29  proBNP: Serum Pro-Brain Natriuretic Peptide: 949 pg/mL (09-29 @ 05:47)  PT: 11.7 sec;   INR: 1.05     PTT - ( 29 Sep 2018 05:47 )  PTT:31.9 sec

## 2018-09-29 NOTE — PROGRESS NOTE ADULT - PROBLEM SELECTOR PLAN 2
No pedal edema, anterior crackles B/L and LLL crackles auscultated, severely elevated EDP during diagnostic cath at   - Given lasix 40 mg IV with plan for additional lasix 40 mg IV x1 this evening. Net neg 520 today so far.   - f/u volume status to assess appropriateness of diuretics tomorrow AM  - ECHO 9/28 revealed LVEF mildly reduced at 49%, mitral inflow pattern c/w impaired LV relaxation  - f/u repeat CXR, chest CT results (complete)  - Core weights entered  - Daily weights, strict I&Os, 1.5 L fluid restriction  - Nutrition consult

## 2018-09-29 NOTE — PROGRESS NOTE ADULT - PROBLEM SELECTOR PLAN 5
Holding home oral hypoglycemics  - Lantus 20 units at bedtime and Humalog 3 units TID with meals  - FS/ISS  - HgbA1C 8.8%  - Nutrition consult ordered

## 2018-09-29 NOTE — PROGRESS NOTE ADULT - SUBJECTIVE AND OBJECTIVE BOX
Pt is s/p  CAD with for surgical intervention    PAST MEDICAL & SURGICAL HISTORY:  Diabetes  HLD (hyperlipidemia)  HTN (hypertension)  CAD (coronary artery disease)  COPD (chronic obstructive pulmonary disease)  H/O chest tube placement    MEDICATIONS  (STANDING):  ALBUTerol/ipratropium for Nebulization 3 milliLiter(s) Nebulizer every 6 hours  aspirin enteric coated 81 milliGRAM(s) Oral daily  atorvastatin 80 milliGRAM(s) Oral at bedtime  buDESOnide 160 MICROgram(s)/formoterol 4.5 MICROgram(s) Inhaler 2 Puff(s) Inhalation two times a day  dextrose 5%. 1000 milliLiter(s) (50 mL/Hr) IV Continuous <Continuous>  dextrose 50% Injectable 12.5 Gram(s) IV Push once  dextrose 50% Injectable 25 Gram(s) IV Push once  dextrose 50% Injectable 25 Gram(s) IV Push once  heparin  Injectable 5000 Unit(s) SubCutaneous every 8 hours  influenza   Vaccine 0.5 milliLiter(s) IntraMuscular once  insulin glargine Injectable (LANTUS) 20 Unit(s) SubCutaneous at bedtime  insulin lispro (HumaLOG) corrective regimen sliding scale   SubCutaneous Before meals and at bedtime  insulin lispro Injectable (HumaLOG) 3 Unit(s) SubCutaneous three times a day before meals  metoprolol tartrate 25 milliGRAM(s) Oral two times a day    MEDICATIONS  (PRN):  dextrose 40% Gel 15 Gram(s) Oral once PRN Blood Glucose LESS THAN 70 milliGRAM(s)/deciliter  glucagon  Injectable 1 milliGRAM(s) IntraMuscular once PRN Glucose LESS THAN 70 milligrams/deciliter    ICU Vital Signs Last 24 Hrs  T(C): 36.6 (29 Sep 2018 10:12), Max: 36.6 (28 Sep 2018 14:24)  T(F): 97.8 (29 Sep 2018 10:12), Max: 97.9 (28 Sep 2018 14:24)  HR: 96 (29 Sep 2018 11:29) (82 - 100)  BP: 142/66 (29 Sep 2018 11:29) (101/58 - 142/66)  BP(mean): --  ABP: --  ABP(mean): --  RR: 18 (29 Sep 2018 11:29) (18 - 18)  SpO2: 95% (28 Sep 2018 20:18) (95% - 95%)      Lungs clear  CV s1 s2    Abd soft  Ext stable                          15.8   10.3  )-----------( 329      ( 29 Sep 2018 05:47 )             48.5   09-29    136  |  96  |  24<H>  ----------------------------<  127<H>  4.4   |  29  |  0.86    Ca    10.2      29 Sep 2018 05:47  Mg     1.9     09-29    TPro  7.8  /  Alb  3.7  /  TBili  0.4  /  DBili  x   /  AST  12  /  ALT  13  /  AlkPhos  70  09-29      echo   EF 49 % Pt is s/p 3 vessel CAD with for surgical intervention  MIDCAD     PAST MEDICAL & SURGICAL HISTORY:  Diabetes  HLD (hyperlipidemia)  HTN (hypertension)  CAD (coronary artery disease)  COPD (chronic obstructive pulmonary disease)  H/O chest tube placement    MEDICATIONS  (STANDING):  ALBUTerol/ipratropium for Nebulization 3 milliLiter(s) Nebulizer every 6 hours  aspirin enteric coated 81 milliGRAM(s) Oral daily  atorvastatin 80 milliGRAM(s) Oral at bedtime  buDESOnide 160 MICROgram(s)/formoterol 4.5 MICROgram(s) Inhaler 2 Puff(s) Inhalation two times a day  dextrose 5%. 1000 milliLiter(s) (50 mL/Hr) IV Continuous <Continuous>  dextrose 50% Injectable 12.5 Gram(s) IV Push once  dextrose 50% Injectable 25 Gram(s) IV Push once  dextrose 50% Injectable 25 Gram(s) IV Push once  heparin  Injectable 5000 Unit(s) SubCutaneous every 8 hours  influenza   Vaccine 0.5 milliLiter(s) IntraMuscular once  insulin glargine Injectable (LANTUS) 20 Unit(s) SubCutaneous at bedtime  insulin lispro (HumaLOG) corrective regimen sliding scale   SubCutaneous Before meals and at bedtime  insulin lispro Injectable (HumaLOG) 3 Unit(s) SubCutaneous three times a day before meals  metoprolol tartrate 25 milliGRAM(s) Oral two times a day    MEDICATIONS  (PRN):  dextrose 40% Gel 15 Gram(s) Oral once PRN Blood Glucose LESS THAN 70 milliGRAM(s)/deciliter  glucagon  Injectable 1 milliGRAM(s) IntraMuscular once PRN Glucose LESS THAN 70 milligrams/deciliter    ICU Vital Signs Last 24 Hrs  T(C): 36.6 (29 Sep 2018 10:12), Max: 36.6 (28 Sep 2018 14:24)  T(F): 97.8 (29 Sep 2018 10:12), Max: 97.9 (28 Sep 2018 14:24)  HR: 96 (29 Sep 2018 11:29) (82 - 100)  BP: 142/66 (29 Sep 2018 11:29) (101/58 - 142/66)  BP(mean): --  ABP: --  ABP(mean): --  RR: 18 (29 Sep 2018 11:29) (18 - 18)  SpO2: 95% (28 Sep 2018 20:18) (95% - 95%)      Lungs clear  CV s1 s2    Abd soft  Ext stable                          15.8   10.3  )-----------( 329      ( 29 Sep 2018 05:47 )             48.5   09-29    136  |  96  |  24<H>  ----------------------------<  127<H>  4.4   |  29  |  0.86    Ca    10.2      29 Sep 2018 05:47  Mg     1.9     09-29    TPro  7.8  /  Alb  3.7  /  TBili  0.4  /  DBili  x   /  AST  12  /  ALT  13  /  AlkPhos  70  09-29      echo   EF 49 %

## 2018-09-29 NOTE — PROGRESS NOTE ADULT - PROBLEM SELECTOR PLAN 4
SBP 100s-130s today  - c/w Metoprolol Tartrate 25mg PO BID  - Hold home enalapril 5mg PO QD prevent perioperative vasodilation  - If needed, titrate up beta blocker or can start calcium channel blocker.  - Continue to monitor

## 2018-09-29 NOTE — PROGRESS NOTE ADULT - ASSESSMENT
52 y/o M with strong FamHx of CAD/MI (4 grandparents  from MI, father  from MI, sister s/p CABG) and PMHx of COPD, Bronchiectasis, h/o MAC, HTN, DM, HTN, HFrEF (EF 49%) with abnormal stress test, who is s/p diagnostic cardiac cath at  on 18 revealing significant 3VCAD with unsuccessful PCI due to heavy calcification at the RCA ostium, who was transferred to [facility] with plan for MIDCAB on Monday 10/1/19 and then plan to return for PCI in 4 weeks after discharge. Pt also noted to be with acute systolic CHF and started on diuretics.

## 2018-09-29 NOTE — PROGRESS NOTE ADULT - PROBLEM SELECTOR PLAN 1
Currently CP free and hemodynamically stable  - s/p diagnostic cath at  on 9/27/18: 3VCAD: ostial LAD: eccentric tubular 75% stenosis, prox LAD: eccentric tubular 40% stenosis, prox Ramus: eccentric tubular 70% stenosis, prox RCA: tubular 20% stenosis, mid RCA: eccentric and calcified tubular 95% stenosis for which attempted PCI was unsuccessful due to heavy calcification at the RCA ostium, severely elevated EDP, diaphragmatic and posterobasal hypokinesis, EF 45%  - Initially planned for staged PCI in with Dr. Corrales but now plan for MIDCAB with Dr. Cristobal on Monday. NPO after MN on Sunday.  - EKG at [facility] admit revealed NSR at 95BPM with RBBB (known), with no acute changes noted  - ECHO revealed normal LV size and wall thickness,  mild global   hypokinesis of the LV, LVEF mildly reduced at 49%, mitral inflow pattern c/w impaired LV relaxation with mildly elevated LA, no evidence for any hemodynamically significant valvular disease, 0.6 cm x 0.6 cm echogenicity seen within the mid inferolateral wall, which likely represents a small fibroma vs. a calcification.  - US carotid duplex revealed no hemodynamically significant carotid artery stenosis, mild atherosclerotic plaque.  - CT head revealed no ICH or acute transcortical infarct, small vessel ischemic ischemic disease, inflammatory sinus disease.  - f/u CXR, bedside spirometry, CT chest (all were complete)  - CTS to obtain ABG  - Lipitor 40 mg PO QD increased to 80 mg PO QD  - c/w lopressor 25 mg BID Currently CP free and hemodynamically stable  - s/p diagnostic cath at  on 9/27/18: 3VCAD: ostial LAD: eccentric tubular 75% stenosis, prox LAD: eccentric tubular 40% stenosis, prox Ramus: eccentric tubular 70% stenosis, prox RCA: tubular 20% stenosis, mid RCA: eccentric and calcified tubular 95% stenosis for which attempted PCI was unsuccessful due to heavy calcification at the RCA ostium, severely elevated EDP, diaphragmatic and posterobasal hypokinesis, EF 45%  - Initially planned for staged PCI in with Dr. Corrales but now plan for MIDCAB with Dr. Cristobal on Monday. NPO after MN on Sunday.  - EKG at [facility] admit revealed NSR at 95BPM with RBBB (known), with no acute changes noted  - ECHO revealed normal LV size and wall thickness,  mild global   hypokinesis of the LV, LVEF mildly reduced at 49%, mitral inflow pattern c/w impaired LV relaxation with mildly elevated LA, no evidence for any hemodynamically significant valvular disease, 0.6 cm x 0.6 cm echogenicity seen within the mid inferolateral wall, which likely represents a small fibroma vs. a calcification.  - US carotid duplex revealed no hemodynamically significant carotid artery stenosis, mild atherosclerotic plaque.  - CT head revealed no ICH or acute transcortical infarct, small vessel ischemic ischemic disease, inflammatory sinus disease.  - UA revealed +bacteria and small LE but asymptomatic  - f/u CXR, bedside spirometry, CT chest (all were complete)  - CTS to obtain ABG  - Lipitor 40 mg PO QD increased to 80 mg PO QD  - c/w lopressor 25 mg BID

## 2018-09-30 LAB
ANION GAP SERPL CALC-SCNC: 12 MMOL/L — SIGNIFICANT CHANGE UP (ref 5–17)
BUN SERPL-MCNC: 21 MG/DL — SIGNIFICANT CHANGE UP (ref 7–23)
CALCIUM SERPL-MCNC: 9.7 MG/DL — SIGNIFICANT CHANGE UP (ref 8.4–10.5)
CHLORIDE SERPL-SCNC: 96 MMOL/L — SIGNIFICANT CHANGE UP (ref 96–108)
CO2 SERPL-SCNC: 25 MMOL/L — SIGNIFICANT CHANGE UP (ref 22–31)
CREAT SERPL-MCNC: 0.77 MG/DL — SIGNIFICANT CHANGE UP (ref 0.5–1.3)
GAS PNL BLDA: SIGNIFICANT CHANGE UP
GLUCOSE SERPL-MCNC: 196 MG/DL — HIGH (ref 70–99)
HCO3 BLDA-SCNC: 24 MMOL/L — SIGNIFICANT CHANGE UP (ref 21–28)
HCT VFR BLD CALC: 47 % — SIGNIFICANT CHANGE UP (ref 39–50)
HGB BLD-MCNC: 15.5 G/DL — SIGNIFICANT CHANGE UP (ref 13–17)
MAGNESIUM SERPL-MCNC: 1.8 MG/DL — SIGNIFICANT CHANGE UP (ref 1.6–2.6)
MCHC RBC-ENTMCNC: 28.2 PG — SIGNIFICANT CHANGE UP (ref 27–34)
MCHC RBC-ENTMCNC: 33 G/DL — SIGNIFICANT CHANGE UP (ref 32–36)
MCV RBC AUTO: 85.6 FL — SIGNIFICANT CHANGE UP (ref 80–100)
PCO2 BLDA: 40 MMHG — SIGNIFICANT CHANGE UP (ref 35–48)
PH BLDA: 7.41 — SIGNIFICANT CHANGE UP (ref 7.35–7.45)
PLATELET # BLD AUTO: 340 K/UL — SIGNIFICANT CHANGE UP (ref 150–400)
PO2 BLDA: 74 MMHG — LOW (ref 83–108)
POTASSIUM SERPL-MCNC: 4.5 MMOL/L — SIGNIFICANT CHANGE UP (ref 3.5–5.3)
POTASSIUM SERPL-SCNC: 4.5 MMOL/L — SIGNIFICANT CHANGE UP (ref 3.5–5.3)
RBC # BLD: 5.49 M/UL — SIGNIFICANT CHANGE UP (ref 4.2–5.8)
RBC # FLD: 12.6 % — SIGNIFICANT CHANGE UP (ref 10.3–16.9)
SAO2 % BLDA: 95 % — SIGNIFICANT CHANGE UP (ref 95–100)
SODIUM SERPL-SCNC: 133 MMOL/L — LOW (ref 135–145)
WBC # BLD: 9.5 K/UL — SIGNIFICANT CHANGE UP (ref 3.8–10.5)
WBC # FLD AUTO: 9.5 K/UL — SIGNIFICANT CHANGE UP (ref 3.8–10.5)

## 2018-09-30 RX ORDER — CHLORHEXIDINE GLUCONATE 213 G/1000ML
1 SOLUTION TOPICAL ONCE
Qty: 0 | Refills: 0 | Status: COMPLETED | OUTPATIENT
Start: 2018-09-30 | End: 2018-09-30

## 2018-09-30 RX ORDER — CHLORHEXIDINE GLUCONATE 213 G/1000ML
1 SOLUTION TOPICAL ONCE
Qty: 0 | Refills: 0 | Status: COMPLETED | OUTPATIENT
Start: 2018-10-01 | End: 2018-10-01

## 2018-09-30 RX ORDER — CHLORHEXIDINE GLUCONATE 213 G/1000ML
20 SOLUTION TOPICAL ONCE
Qty: 0 | Refills: 0 | Status: COMPLETED | OUTPATIENT
Start: 2018-09-30 | End: 2018-10-01

## 2018-09-30 RX ADMIN — Medication 3: at 16:29

## 2018-09-30 RX ADMIN — Medication 3 UNIT(S): at 07:42

## 2018-09-30 RX ADMIN — HEPARIN SODIUM 5000 UNIT(S): 5000 INJECTION INTRAVENOUS; SUBCUTANEOUS at 06:20

## 2018-09-30 RX ADMIN — BUDESONIDE AND FORMOTEROL FUMARATE DIHYDRATE 2 PUFF(S): 160; 4.5 AEROSOL RESPIRATORY (INHALATION) at 06:20

## 2018-09-30 RX ADMIN — Medication 3 MILLILITER(S): at 17:41

## 2018-09-30 RX ADMIN — Medication 3 UNIT(S): at 16:29

## 2018-09-30 RX ADMIN — Medication 25 MILLIGRAM(S): at 06:20

## 2018-09-30 RX ADMIN — Medication 81 MILLIGRAM(S): at 11:16

## 2018-09-30 RX ADMIN — Medication 1: at 07:42

## 2018-09-30 RX ADMIN — HEPARIN SODIUM 5000 UNIT(S): 5000 INJECTION INTRAVENOUS; SUBCUTANEOUS at 21:49

## 2018-09-30 RX ADMIN — INSULIN GLARGINE 20 UNIT(S): 100 INJECTION, SOLUTION SUBCUTANEOUS at 21:49

## 2018-09-30 RX ADMIN — CHLORHEXIDINE GLUCONATE 1 APPLICATION(S): 213 SOLUTION TOPICAL at 23:07

## 2018-09-30 RX ADMIN — BUDESONIDE AND FORMOTEROL FUMARATE DIHYDRATE 2 PUFF(S): 160; 4.5 AEROSOL RESPIRATORY (INHALATION) at 17:41

## 2018-09-30 RX ADMIN — Medication 3: at 21:49

## 2018-09-30 RX ADMIN — Medication 4: at 11:16

## 2018-09-30 RX ADMIN — Medication 25 MILLIGRAM(S): at 17:41

## 2018-09-30 RX ADMIN — Medication 3 MILLILITER(S): at 09:20

## 2018-09-30 RX ADMIN — Medication 3 UNIT(S): at 11:16

## 2018-09-30 RX ADMIN — ATORVASTATIN CALCIUM 80 MILLIGRAM(S): 80 TABLET, FILM COATED ORAL at 21:49

## 2018-09-30 RX ADMIN — HEPARIN SODIUM 5000 UNIT(S): 5000 INJECTION INTRAVENOUS; SUBCUTANEOUS at 14:21

## 2018-09-30 RX ADMIN — CHLORHEXIDINE GLUCONATE 1 APPLICATION(S): 213 SOLUTION TOPICAL at 18:48

## 2018-09-30 RX ADMIN — Medication 3 MILLILITER(S): at 06:20

## 2018-09-30 NOTE — PROGRESS NOTE ADULT - PROBLEM SELECTOR PLAN 1
Currently CP free and hemodynamically stable  - s/p diagnostic cath at  on 9/27/18: 3VCAD: ostial LAD: eccentric tubular 75% stenosis, prox LAD: eccentric tubular 40% stenosis, prox Ramus: eccentric tubular 70% stenosis, prox RCA: tubular 20% stenosis, mid RCA: eccentric and calcified tubular 95% stenosis for which attempted PCI was unsuccessful due to heavy calcification at the RCA ostium, severely elevated EDP, diaphragmatic and posterobasal hypokinesis, EF 45%  - Initially planned for staged PCI in with Dr. Corrales but now plan for MIDCAB with Dr. Cristobal on Monday. NPO after MN on Sunday.  - EKG at [facility] admit revealed NSR at 95BPM with RBBB (known), with no acute changes noted  - ECHO revealed normal LV size and wall thickness,  mild global   hypokinesis of the LV, LVEF mildly reduced at 49%, mitral inflow pattern c/w impaired LV relaxation with mildly elevated LA, no evidence for any hemodynamically significant valvular disease, 0.6 cm x 0.6 cm echogenicity seen within the mid inferolateral wall, which likely represents a small fibroma vs. a calcification.  - US carotid duplex revealed no hemodynamically significant carotid artery stenosis, mild atherosclerotic plaque.  - CT head revealed no ICH or acute transcortical infarct, small vessel ischemic ischemic disease, inflammatory sinus disease.  - UA revealed +bacteria and small LE but asymptomatic  - f/u CXR, bedside spirometry, CT chest (all were complete)  - CTS to obtain ABG  - Lipitor 40 mg PO QD increased to 80 mg PO QD  - c/w lopressor 25 mg BID

## 2018-09-30 NOTE — PROGRESS NOTE ADULT - SUBJECTIVE AND OBJECTIVE BOX
Pt is scheduled for CABS in AM     PAST MEDICAL & SURGICAL HISTORY:  Diabetes  HLD (hyperlipidemia)  HTN (hypertension)  CAD (coronary artery disease)  COPD (chronic obstructive pulmonary disease)  H/O chest tube placement      MEDICATIONS  (STANDING):  ALBUTerol/ipratropium for Nebulization 3 milliLiter(s) Nebulizer every 6 hours  aspirin enteric coated 81 milliGRAM(s) Oral daily  atorvastatin 80 milliGRAM(s) Oral at bedtime  buDESOnide 160 MICROgram(s)/formoterol 4.5 MICROgram(s) Inhaler 2 Puff(s) Inhalation two times a day  dextrose 5%. 1000 milliLiter(s) (50 mL/Hr) IV Continuous <Continuous>  dextrose 50% Injectable 12.5 Gram(s) IV Push once  dextrose 50% Injectable 25 Gram(s) IV Push once  dextrose 50% Injectable 25 Gram(s) IV Push once  heparin  Injectable 5000 Unit(s) SubCutaneous every 8 hours  influenza   Vaccine 0.5 milliLiter(s) IntraMuscular once  insulin glargine Injectable (LANTUS) 20 Unit(s) SubCutaneous at bedtime  insulin lispro (HumaLOG) corrective regimen sliding scale   SubCutaneous Before meals and at bedtime  insulin lispro Injectable (HumaLOG) 3 Unit(s) SubCutaneous three times a day before meals  metoprolol tartrate 25 milliGRAM(s) Oral two times a day    MEDICATIONS  (PRN):  dextrose 40% Gel 15 Gram(s) Oral once PRN Blood Glucose LESS THAN 70 milliGRAM(s)/deciliter  glucagon  Injectable 1 milliGRAM(s) IntraMuscular once PRN Glucose LESS THAN 70 milligrams/deciliter    ICU Vital Signs Last 24 Hrs  T(C): 36.5 (30 Sep 2018 10:08), Max: 37.3 (29 Sep 2018 17:28)  T(F): 97.7 (30 Sep 2018 10:08), Max: 99.1 (29 Sep 2018 17:28)  HR: 90 (30 Sep 2018 08:39) (90 - 112)  BP: 143/74 (30 Sep 2018 08:39) (118/68 - 143/74)  BP(mean): --  ABP: --  ABP(mean): --  RR: 18 (30 Sep 2018 08:39) (16 - 18)  SpO2: 95% (30 Sep 2018 08:39) (95% - 99%)      lungs clear  CV s1 s2   Abd soft  Ext stable                          15.5   9.5   )-----------( 340      ( 30 Sep 2018 07:57 )             47.0   09-30    133<L>  |  96  |  21  ----------------------------<  196<H>  4.5   |  25  |  0.77    Ca    9.7      30 Sep 2018 07:57  Mg     1.8     09-30    TPro  7.8  /  Alb  3.7  /  TBili  0.4  /  DBili  x   /  AST  12  /  ALT  13  /  AlkPhos  70  09-29      echjo     EF 49 % Pt is scheduled for CABS in AM  s/p unsuccesfull  angioplasty\/PCI  Triple vessel disease     PAST MEDICAL & SURGICAL HISTORY:  Diabetes  HLD (hyperlipidemia)  HTN (hypertension)  CAD (coronary artery disease)  COPD (chronic obstructive pulmonary disease)  H/O chest tube placement      MEDICATIONS  (STANDING):  ALBUTerol/ipratropium for Nebulization 3 milliLiter(s) Nebulizer every 6 hours  aspirin enteric coated 81 milliGRAM(s) Oral daily  atorvastatin 80 milliGRAM(s) Oral at bedtime  buDESOnide 160 MICROgram(s)/formoterol 4.5 MICROgram(s) Inhaler 2 Puff(s) Inhalation two times a day  dextrose 5%. 1000 milliLiter(s) (50 mL/Hr) IV Continuous <Continuous>  dextrose 50% Injectable 12.5 Gram(s) IV Push once  dextrose 50% Injectable 25 Gram(s) IV Push once  dextrose 50% Injectable 25 Gram(s) IV Push once  heparin  Injectable 5000 Unit(s) SubCutaneous every 8 hours  influenza   Vaccine 0.5 milliLiter(s) IntraMuscular once  insulin glargine Injectable (LANTUS) 20 Unit(s) SubCutaneous at bedtime  insulin lispro (HumaLOG) corrective regimen sliding scale   SubCutaneous Before meals and at bedtime  insulin lispro Injectable (HumaLOG) 3 Unit(s) SubCutaneous three times a day before meals  metoprolol tartrate 25 milliGRAM(s) Oral two times a day    MEDICATIONS  (PRN):  dextrose 40% Gel 15 Gram(s) Oral once PRN Blood Glucose LESS THAN 70 milliGRAM(s)/deciliter  glucagon  Injectable 1 milliGRAM(s) IntraMuscular once PRN Glucose LESS THAN 70 milligrams/deciliter    ICU Vital Signs Last 24 Hrs  T(C): 36.5 (30 Sep 2018 10:08), Max: 37.3 (29 Sep 2018 17:28)  T(F): 97.7 (30 Sep 2018 10:08), Max: 99.1 (29 Sep 2018 17:28)  HR: 90 (30 Sep 2018 08:39) (90 - 112)  BP: 143/74 (30 Sep 2018 08:39) (118/68 - 143/74)  BP(mean): --  ABP: --  ABP(mean): --  RR: 18 (30 Sep 2018 08:39) (16 - 18)  SpO2: 95% (30 Sep 2018 08:39) (95% - 99%)      lungs clear  CV s1 s2   Abd soft  Ext stable                          15.5   9.5   )-----------( 340      ( 30 Sep 2018 07:57 )             47.0   09-30    133<L>  |  96  |  21  ----------------------------<  196<H>  4.5   |  25  |  0.77    Ca    9.7      30 Sep 2018 07:57  Mg     1.8     09-30    TPro  7.8  /  Alb  3.7  /  TBili  0.4  /  DBili  x   /  AST  12  /  ALT  13  /  AlkPhos  70  09-29      echjo     EF 49 %

## 2018-09-30 NOTE — PROGRESS NOTE ADULT - ASSESSMENT
54 y/o M with strong FamHx of CAD/MI (4 grandparents  from MI, father  from MI, sister s/p CABG) and PMHx of COPD, Bronchiectasis, h/o MAC, HTN, DM, HTN, HFrEF (EF 49%) with abnormal stress test, who is s/p diagnostic cardiac cath at  on 18 revealing significant 3VCAD with unsuccessful PCI due to heavy calcification at the RCA ostium, who was transferred to [facility] with plan for MIDCAB on Monday 10/1/19 and then plan to return for PCI in 4 weeks after discharge. Pt also noted to be in acute systolic CHF s/p diuretics.

## 2018-09-30 NOTE — PROGRESS NOTE ADULT - SUBJECTIVE AND OBJECTIVE BOX
Interventional Cardiology PA Adult Progress Note    CC: f/u 3VCAD by diagnostic cath   Subjective Assessment: Pt seen and examined at bedside this AM. Pt without complaints at this time. Pt denies CP or SOB    12   	  MEDICATIONS:  metoprolol tartrate 25 milliGRAM(s) Oral two times a day      ALBUTerol/ipratropium for Nebulization 3 milliLiter(s) Nebulizer every 6 hours  buDESOnide 160 MICROgram(s)/formoterol 4.5 MICROgram(s) Inhaler 2 Puff(s) Inhalation two times a day        atorvastatin 80 milliGRAM(s) Oral at bedtime  dextrose 40% Gel 15 Gram(s) Oral once PRN  dextrose 50% Injectable 12.5 Gram(s) IV Push once  dextrose 50% Injectable 25 Gram(s) IV Push once  dextrose 50% Injectable 25 Gram(s) IV Push once  glucagon  Injectable 1 milliGRAM(s) IntraMuscular once PRN  insulin glargine Injectable (LANTUS) 20 Unit(s) SubCutaneous at bedtime  insulin lispro (HumaLOG) corrective regimen sliding scale   SubCutaneous Before meals and at bedtime  insulin lispro Injectable (HumaLOG) 3 Unit(s) SubCutaneous three times a day before meals    aspirin enteric coated 81 milliGRAM(s) Oral daily  dextrose 5%. 1000 milliLiter(s) IV Continuous <Continuous>  heparin  Injectable 5000 Unit(s) SubCutaneous every 8 hours  influenza   Vaccine 0.5 milliLiter(s) IntraMuscular once      	    [PHYSICAL EXAM:  TELEMETRY:  T(C): 36.5 (09-30-18 @ 10:08), Max: 37.3 (09-29-18 @ 17:28)  HR: 90 (09-30-18 @ 08:39) (90 - 112)  BP: 143/74 (09-30-18 @ 08:39) (118/68 - 143/74)  RR: 18 (09-30-18 @ 08:39) (16 - 18)  SpO2: 95% (09-30-18 @ 08:39) (95% - 99%)  Wt(kg): --  I&O's Summary    29 Sep 2018 07:01  -  30 Sep 2018 07:00  --------------------------------------------------------  IN: 420 mL / OUT: 1400 mL / NET: -980 mL    30 Sep 2018 07:01  -  30 Sep 2018 13:00  --------------------------------------------------------  IN: 180 mL / OUT: 0 mL / NET: 180 mL        Mazariegos:  Central/PICC/Mid Line:                                         Appearance: Normal	  HEENT:   Normal oral mucosa, PERRL, EOMI	  Neck: Supple, + JVD/ - JVD; Carotid Bruit   Cardiovascular: Normal S1 S2, No JVD, No murmurs,   Respiratory: Lungs clear to auscultation/Decreased Breath Sounds/No Rales, Rhonchi, Wheezing	  Gastrointestinal:  Soft, Non-tender, + BS	  Skin: No rashes, No ecchymoses, No cyanosis  Extremities: Normal range of motion, No clubbing, cyanosis or edema  Vascular: Peripheral pulses palpable 2+ bilaterally  Neurologic: Non-focal  Psychiatry: A & O x 3, Mood & affect appropriate      	    ECG:  	  RADIOLOGY:   DIAGNOSTIC TESTING:  [ ] Echocardiogram:  [ ]  Catheterization:  [ ] Stress Test:    [ ] KEMAL  OTHER: 	    LABS:	 	  CARDIAC MARKERS:                                  15.5   9.5   )-----------( 340      ( 30 Sep 2018 07:57 )             47.0     09-30    133<L>  |  96  |  21  ----------------------------<  196<H>  4.5   |  25  |  0.77    Ca    9.7      30 Sep 2018 07:57  Mg     1.8     09-30    TPro  7.8  /  Alb  3.7  /  TBili  0.4  /  DBili  x   /  AST  12  /  ALT  13  /  AlkPhos  70  09-29    proBNP:   Lipid Profile:   HgA1c:   TSH:   PT/INR - ( 29 Sep 2018 05:47 )   PT: 11.7 sec;   INR: 1.05          PTT - ( 29 Sep 2018 05:47 )  PTT:31.9 sec    ASSESSMENT/PLAN: 	        DVT ppx:  Dispo: Interventional Cardiology PA Adult Progress Note    CC: f/u 3VCAD by diagnostic cath   Subjective Assessment: Pt seen and examined at bedside this AM. Pt without complaints at this time. Pt denies CP or SOB    12   	  MEDICATIONS:  metoprolol tartrate 25 milliGRAM(s) Oral two times a day      ALBUTerol/ipratropium for Nebulization 3 milliLiter(s) Nebulizer every 6 hours  buDESOnide 160 MICROgram(s)/formoterol 4.5 MICROgram(s) Inhaler 2 Puff(s) Inhalation two times a day        atorvastatin 80 milliGRAM(s) Oral at bedtime  dextrose 40% Gel 15 Gram(s) Oral once PRN  dextrose 50% Injectable 12.5 Gram(s) IV Push once  dextrose 50% Injectable 25 Gram(s) IV Push once  dextrose 50% Injectable 25 Gram(s) IV Push once  glucagon  Injectable 1 milliGRAM(s) IntraMuscular once PRN  insulin glargine Injectable (LANTUS) 20 Unit(s) SubCutaneous at bedtime  insulin lispro (HumaLOG) corrective regimen sliding scale   SubCutaneous Before meals and at bedtime  insulin lispro Injectable (HumaLOG) 3 Unit(s) SubCutaneous three times a day before meals    aspirin enteric coated 81 milliGRAM(s) Oral daily  dextrose 5%. 1000 milliLiter(s) IV Continuous <Continuous>  heparin  Injectable 5000 Unit(s) SubCutaneous every 8 hours  influenza   Vaccine 0.5 milliLiter(s) IntraMuscular once      	    [PHYSICAL EXAM:  TELEMETRY:  T(C): 36.5 (09-30-18 @ 10:08), Max: 37.3 (09-29-18 @ 17:28)  HR: 90 (09-30-18 @ 08:39) (90 - 112)  BP: 143/74 (09-30-18 @ 08:39) (118/68 - 143/74)  RR: 18 (09-30-18 @ 08:39) (16 - 18)  SpO2: 95% (09-30-18 @ 08:39) (95% - 99%)  Wt(kg): --  I&O's Summary    29 Sep 2018 07:01  -  30 Sep 2018 07:00  --------------------------------------------------------  IN: 420 mL / OUT: 1400 mL / NET: -980 mL    30 Sep 2018 07:01  -  30 Sep 2018 13:00  --------------------------------------------------------  IN: 180 mL / OUT: 0 mL / NET: 180 mL        Mazariegos:  Central/PICC/Mid Line:                                         Appearance: Normal	  HEENT:   Normal oral mucosa, PERRL, EOMI	  Neck: Supple, - JVD  Cardiovascular: Normal S1 S2, No JVD, No murmurs,   Respiratory: Lungs clear to auscultation, No Rales, Rhonchi, Wheezing	  Gastrointestinal:  Soft, Non-tender, + BS	  Skin: R groin with purple discoloration nursing home between belly button and pubic to penis to midpoint of right thigh. No discoloration to back. Dry, scaly B/L shins (L>R)  Extremities: Normal range of motion, No clubbing, cyanosis or edema  Vascular: Peripheral pulses palpable 2+ bilaterally  Neurologic: Non-focal  Psychiatry: A & O x 3, Mood & affect appropriate      	    ECG:  	  RADIOLOGY:   DIAGNOSTIC TESTING:  [ ] Echocardiogram:  [ ]  Catheterization:  [ ] Stress Test:    [ ] KEMAL  OTHER: 	    LABS:	 	  CARDIAC MARKERS:                                  15.5   9.5   )-----------( 340      ( 30 Sep 2018 07:57 )             47.0     09-30    133<L>  |  96  |  21  ----------------------------<  196<H>  4.5   |  25  |  0.77    Ca    9.7      30 Sep 2018 07:57  Mg     1.8     09-30    TPro  7.8  /  Alb  3.7  /  TBili  0.4  /  DBili  x   /  AST  12  /  ALT  13  /  AlkPhos  70  09-29    proBNP:   Lipid Profile:   HgA1c:   TSH:   PT/INR - ( 29 Sep 2018 05:47 )   PT: 11.7 sec;   INR: 1.05          PTT - ( 29 Sep 2018 05:47 )  PTT:31.9 sec    ASSESSMENT/PLAN: 	        DVT ppx:  Dispo:

## 2018-09-30 NOTE — PROGRESS NOTE ADULT - PROBLEM SELECTOR PLAN 2
No pedal edema, severely elevated EDP during diagnostic cath at , no crackles on exam this AM   - Given lasix 40 mg IV x2 yesterday.  Net neg -1030  -no additional diuretic given today   - ECHO 9/28 revealed LVEF mildly reduced at 49%, mitral inflow pattern c/w impaired LV relaxation  - f/u repeat CXR, chest CT results (complete)  - Core weights entered  - Daily weights, strict I&Os, 1.5 L fluid restriction  - Nutrition consult

## 2018-09-30 NOTE — DIETITIAN INITIAL EVALUATION ADULT. - OTHER INFO
54 y/o male admitted with 3 vessel CAD and MID CAD.No N/V/D or pain reported.Skin intact.Patient reported he feels his FS/BS are elevated in hospital due to diet.As per diet recall, patient eats mostly lean protein/vegetables and avoids high salt /fried foods and concentrated sweets.Wife does cooking and follows healthy guidelines.Patient feels cardiac issue are genetic.

## 2018-09-30 NOTE — PROGRESS NOTE ADULT - SUBJECTIVE AND OBJECTIVE BOX
Planned Date of Surgery:                                                                                                                  Surgeon:    Procedure:    HPI:  53y Male    PAST MEDICAL & SURGICAL HISTORY:  Diabetes  HLD (hyperlipidemia)  HTN (hypertension)  CAD (coronary artery disease)  COPD (chronic obstructive pulmonary disease)  H/O chest tube placement      No Known Allergies      MEDICATIONS  (STANDING):  ALBUTerol/ipratropium for Nebulization 3 milliLiter(s) Nebulizer every 6 hours  aspirin enteric coated 81 milliGRAM(s) Oral daily  atorvastatin 80 milliGRAM(s) Oral at bedtime  buDESOnide 160 MICROgram(s)/formoterol 4.5 MICROgram(s) Inhaler 2 Puff(s) Inhalation two times a day  dextrose 5%. 1000 milliLiter(s) (50 mL/Hr) IV Continuous <Continuous>  dextrose 50% Injectable 12.5 Gram(s) IV Push once  dextrose 50% Injectable 25 Gram(s) IV Push once  dextrose 50% Injectable 25 Gram(s) IV Push once  heparin  Injectable 5000 Unit(s) SubCutaneous every 8 hours  influenza   Vaccine 0.5 milliLiter(s) IntraMuscular once  insulin glargine Injectable (LANTUS) 20 Unit(s) SubCutaneous at bedtime  insulin lispro (HumaLOG) corrective regimen sliding scale   SubCutaneous Before meals and at bedtime  insulin lispro Injectable (HumaLOG) 3 Unit(s) SubCutaneous three times a day before meals  metoprolol tartrate 25 milliGRAM(s) Oral two times a day    MEDICATIONS  (PRN):  dextrose 40% Gel 15 Gram(s) Oral once PRN Blood Glucose LESS THAN 70 milliGRAM(s)/deciliter  glucagon  Injectable 1 milliGRAM(s) IntraMuscular once PRN Glucose LESS THAN 70 milligrams/deciliter      On Beta Blocker? YES / NO / CONTRAINDICATED Reason_______________    Labs:                        15.5   9.5   )-----------( 340      ( 30 Sep 2018 07:57 )             47.0     09-30    133<L>  |  96  |  21  ----------------------------<  196<H>  4.5   |  25  |  0.77    Ca    9.7      30 Sep 2018 07:57  Mg     1.8     09-30    TPro  7.8  /  Alb  3.7  /  TBili  0.4  /  DBili  x   /  AST  12  /  ALT  13  /  AlkPhos  70      PT/INR - ( 29 Sep 2018 05:47 )   PT: 11.7 sec;   INR: 1.05          PTT - ( 29 Sep 2018 05:47 )  PTT:31.9 sec  Urinalysis Basic - ( 29 Sep 2018 05:25 )    Color: Yellow / Appearance: Clear / S.020 / pH: x  Gluc: x / Ketone: NEGATIVE  / Bili: Negative / Urobili: 0.2 E.U./dL   Blood: x / Protein: 30 mg/dL / Nitrite: NEGATIVE   Leuk Esterase: Small / RBC: < 5 /HPF / WBC < 5 /HPF   Sq Epi: x / Non Sq Epi: 0-5 /HPF / Bacteria: Present /HPF      ABO Interpretation: O (18 @ 12:42)    Hgb A1C:    EKG:    CXR:    CT Scans:    Cath Report:    Echo:    PFT's:    Carotid Duplex:    Consult in Chart?  YES / NO  Consent in Chart? YES / NO  Pre-op Orders Placed? YES / NO  Blood Prodeucts Ordered? YES / NO  NPO ordered? YES / NO Planned Date of Surgery: 10/01/18                                                                                                                Surgeon: MD Levar    Procedure: CABG    HPI:  This 53 year old male with strong family history of CAD/MI and PMH significant for COPD, bronchiectasis, h/o MAC, HTN, DM, and HTN presented to CT surgery with findings of CAD requiring for surgical intervention.  Patient initially presented to [facility] [place] for diagnositic     52 y/o M with strong FamHx of CAD/MI (4 grandparents  from MI, father  from [place], sister s/p CABG) and PMHx of COPD, Bronchiectasis, h/o MAC, HTN, DM, HTN who was initially referred to  for cardiac cath due to abnormal stress test on 18, which as per transfer notes from , showed small amount of ischemia in the apical region. He states his PCP, referred him for stress test (unsure why though per pt) which was abnormal prompting him to proceed with elective cath at . Pt is now s/p diagnostic cardiac cath at  on 18 revealing significant 3VCAD: ostial LAD: eccentric tubular 75% stenosis, prox LAD: eccentric tubular 40% stenosis, prox Ramus: eccentric tubular 70% stenosis, prox RCA: tubular 20% stenosis, mid RCA: eccentric and calcified tubular 95% stenosis for which attempted PCI was unsuccessful due to heavy calcification at the RCA ostium, severely elevated EDP, diaphragmatic and posterobasal hypokinesis, EF 45%. Pt was deemed medically stable for transfer to [facility] on 18 with plan for staged PCI on 18 with Dr. Corrales. Upon arrival to [facility], VS noted as 144/74, HR 92, RR 18, SpO2 99% on RA. EKG shows SR at 95BPM with RBBB (known), with no acute changes noted. On interview with Prasanth HOGAN, pt endorses CID with walking up 2 flights of stairs which he attributes to his COPD. Pt denies any HA, dizziness, syncope, CP, palpitations, SOB at rest, n/v/d, diaphoresis, dysuria, fevers, chills, melena, LE edema, or any other symptoms at this time. As per d/w Dr. Corrales, pt was already loaded with ASA/Plavix prior to procedure at  and will receive ASA 81mg and Plavix 75mg on the morning of 18 prior to staged PCI.  PAST MEDICAL & SURGICAL HISTORY:  Diabetes  HLD (hyperlipidemia)  HTN (hypertension)  CAD (coronary artery disease)  COPD (chronic obstructive pulmonary disease)  H/O chest tube placement      No Known Allergies      MEDICATIONS  (STANDING):  ALBUTerol/ipratropium for Nebulization 3 milliLiter(s) Nebulizer every 6 hours  aspirin enteric coated 81 milliGRAM(s) Oral daily  atorvastatin 80 milliGRAM(s) Oral at bedtime  buDESOnide 160 MICROgram(s)/formoterol 4.5 MICROgram(s) Inhaler 2 Puff(s) Inhalation two times a day  dextrose 5%. 1000 milliLiter(s) (50 mL/Hr) IV Continuous <Continuous>  dextrose 50% Injectable 12.5 Gram(s) IV Push once  dextrose 50% Injectable 25 Gram(s) IV Push once  dextrose 50% Injectable 25 Gram(s) IV Push once  heparin  Injectable 5000 Unit(s) SubCutaneous every 8 hours  influenza   Vaccine 0.5 milliLiter(s) IntraMuscular once  insulin glargine Injectable (LANTUS) 20 Unit(s) SubCutaneous at bedtime  insulin lispro (HumaLOG) corrective regimen sliding scale   SubCutaneous Before meals and at bedtime  insulin lispro Injectable (HumaLOG) 3 Unit(s) SubCutaneous three times a day before meals  metoprolol tartrate 25 milliGRAM(s) Oral two times a day    MEDICATIONS  (PRN):  dextrose 40% Gel 15 Gram(s) Oral once PRN Blood Glucose LESS THAN 70 milliGRAM(s)/deciliter  glucagon  Injectable 1 milliGRAM(s) IntraMuscular once PRN Glucose LESS THAN 70 milligrams/deciliter      On Beta Blocker? YES / NO / CONTRAINDICATED Reason_______________    Labs:                        15.5   9.5   )-----------( 340      ( 30 Sep 2018 07:57 )             47.0     09-30    133<L>  |  96  |  21  ----------------------------<  196<H>  4.5   |  25  |  0.77    Ca    9.7      30 Sep 2018 07:57  Mg     1.8     -30    TPro  7.8  /  Alb  3.7  /  TBili  0.4  /  DBili  x   /  AST  12  /  ALT  13  /  AlkPhos  70      PT/INR - ( 29 Sep 2018 05:47 )   PT: 11.7 sec;   INR: 1.05          PTT - ( 29 Sep 2018 05:47 )  PTT:31.9 sec  Urinalysis Basic - ( 29 Sep 2018 05:25 )    Color: Yellow / Appearance: Clear / S.020 / pH: x  Gluc: x / Ketone: NEGATIVE  / Bili: Negative / Urobili: 0.2 E.U./dL   Blood: x / Protein: 30 mg/dL / Nitrite: NEGATIVE   Leuk Esterase: Small / RBC: < 5 /HPF / WBC < 5 /HPF   Sq Epi: x / Non Sq Epi: 0-5 /HPF / Bacteria: Present /HPF      ABO Interpretation: O (18 @ 12:42)    Hgb A1C:    EKG:    CXR:    CT Scans:    Cath Report:    Echo:    PFT's:    Carotid Duplex:    Consult in Chart?  YES / NO  Consent in Chart? YES / NO  Pre-op Orders Placed? YES / NO  Blood Prodeucts Ordered? YES / NO  NPO ordered? YES / NO Planned Date of Surgery: 10/01/18                                                                                                                Surgeon: MD Levar    Procedure: CABG    HPI:  This 53 year old male with strong family history of CAD/MI and PMH significant for COPD, bronchiectasis, h/o MAC, HTN, DM, and HTN presented to CT surgery with findings of CAD requiring for surgical intervention.  Patient initially presented to [facility] [place] diagnostic catheterization revealing significamt multi-vessel coronary disease involving ostial LAD: eccentric tubular 75% stenosis, prox LAD with eccentric tubular 405 stenosis and proc ramus eccentric tubular 70% stenosis, prox RCA of 20% stenosis and mid RCA of 95% stenosis and EF 45%.  PCI was attempted without success.  Patient is referred for surgical intervention.      PAST MEDICAL & SURGICAL HISTORY:  Diabetes  HLD (hyperlipidemia)  HTN (hypertension)  CAD (coronary artery disease)  COPD (chronic obstructive pulmonary disease)  H/O chest tube placement      No Known Allergies      MEDICATIONS  (STANDING):  ALBUTerol/ipratropium for Nebulization 3 milliLiter(s) Nebulizer every 6 hours  aspirin enteric coated 81 milliGRAM(s) Oral daily  atorvastatin 80 milliGRAM(s) Oral at bedtime  buDESOnide 160 MICROgram(s)/formoterol 4.5 MICROgram(s) Inhaler 2 Puff(s) Inhalation two times a day  dextrose 5%. 1000 milliLiter(s) (50 mL/Hr) IV Continuous <Continuous>  dextrose 50% Injectable 12.5 Gram(s) IV Push once  dextrose 50% Injectable 25 Gram(s) IV Push once  dextrose 50% Injectable 25 Gram(s) IV Push once  heparin  Injectable 5000 Unit(s) SubCutaneous every 8 hours  influenza   Vaccine 0.5 milliLiter(s) IntraMuscular once  insulin glargine Injectable (LANTUS) 20 Unit(s) SubCutaneous at bedtime  insulin lispro (HumaLOG) corrective regimen sliding scale   SubCutaneous Before meals and at bedtime  insulin lispro Injectable (HumaLOG) 3 Unit(s) SubCutaneous three times a day before meals  metoprolol tartrate 25 milliGRAM(s) Oral two times a day    MEDICATIONS  (PRN):  dextrose 40% Gel 15 Gram(s) Oral once PRN Blood Glucose LESS THAN 70 milliGRAM(s)/deciliter  glucagon  Injectable 1 milliGRAM(s) IntraMuscular once PRN Glucose LESS THAN 70 milligrams/deciliter      On Beta Blocker? YES  Labs:                        15.5   9.5   )-----------( 340      ( 30 Sep 2018 07:57 )             47.0         133<L>  |  96  |  21  ----------------------------<  196<H>  4.5   |  25  |  0.77    Ca    9.7      30 Sep 2018 07:57  Mg     1.8         TPro  7.8  /  Alb  3.7  /  TBili  0.4  /  DBili  x   /  AST  12  /  ALT  13  /  AlkPhos  70      PT/INR - ( 29 Sep 2018 05:47 )   PT: 11.7 sec;   INR: 1.05          PTT - ( 29 Sep 2018 05:47 )  PTT:31.9 sec  Urinalysis Basic - ( 29 Sep 2018 05:25 )    Color: Yellow / Appearance: Clear / S.020 / pH: x  Gluc: x / Ketone: NEGATIVE  / Bili: Negative / Urobili: 0.2 E.U./dL   Blood: x / Protein: 30 mg/dL / Nitrite: NEGATIVE   Leuk Esterase: Small / RBC: < 5 /HPF / WBC < 5 /HPF   Sq Epi: x / Non Sq Epi: 0-5 /HPF / Bacteria: Present /HPF      ABO Interpretation: O (18 @ 12:42)    Hgb A1C: 8.8    EKG  < from: 12 Lead ECG (18 @ 08:13) >    Ventricular Rate 96 BPM    Atrial Rate 96 BPM    P-R Interval 182 ms    QRS Duration 140 ms    Q-T Interval 384 ms    QTC Calculation(Bezet) 485 ms    P Axis -19 degrees    R Axis -66 degrees    T Axis 56 degrees    Diagnosis Line Sinus rhythm withfrequent premature ventricular complexes  Left axis deviation  Right bundle branch block  Inferior infarct , age undetermined  Abnormal ECG    CXR:  PA/LAT: performed    CT Scans:  Thoracic aorta normal caliber and configuration. Mild atherosclerotic   calcification of the arch.  Multifocal areas of moderate varicoid bronchiectasis and bronchial wall   thickening suggestive of  probable chronic airway inflammation as well as areas of probable chronic   mucous plugging  predominantly involving the upper lobes, lingula and right middle lobe.    Cath Report:  ostial LAD: eccentric tubular 75% stenosis, prox LAD with eccentric tubular 405 stenosis and proc ramus eccentric tubular 70% stenosis, prox RCA of 20% stenosis and mid RCA of 95% stenosis and EF 45%.    Echo:  < from: TTE Echo w/Cont Complete (18 @ 11:08) >  Normal left ventricular size and wall thickness.There is mild global   hypokinesis of the left ventricle.The left ventricular ejection fraction   is   mildly reduced.The left ventricular ejection fraction is 49%. The right   ventricle is normal in size and function.The left atrial size is   normal.The   mitral inflow pattern is consistent with impaired left ventricular   relaxation   with mildly elevated left atrial pressure (8-14mmHg).  Right atrial size   is   normal.No evidence for anyhemodynamically significant valvular   disease.There   was insufficient TR detected from which to calculate pulmonary artery   systolic   pressure.  There is no pericardial effusion.There is a 0.6 cm x 0.6 cm   echogenicity seen within the mid inferolateral wall, which likely   represents a   small fibroma vs. a calcification.    PFT's:  performed  Carotid Duplex:  < from: US Duplex Carotid Arteries Complete, Bilateral (18 @ 14:49) >  No hemodynamically significant carotid artery stenosis.      Consult in Chart?  YES / NO  Consent in Chart? YES / NO  Pre-op Orders Placed? YES / NO  Blood Prodeucts Ordered? YES / NO  NPO ordered? YES / NO Planned Date of Surgery: 10/01/18                                                                                                                Surgeon: MD Levar    Procedure: CABG    HPI:  This 53 year old male with strong family history of CAD/MI and PMH significant for COPD, bronchiectasis, h/o MAC, HTN, DM, and HTN presented to CT surgery with findings of CAD requiring for surgical intervention.  Patient initially presented to [facility] [place] catheterization revealing significamt multi-vessel coronary disease involving ostial LAD: eccentric tubular 75% stenosis, prox LAD with eccentric tubular 405 stenosis and proc ramus eccentric tubular 70% stenosis, prox RCA of 20% stenosis and mid RCA of 95% stenosis and EF 45%.  PCI was attempted without success.  Patient is referred for surgical intervention.      PAST MEDICAL & SURGICAL HISTORY:  Diabetes  HLD (hyperlipidemia)  HTN (hypertension)  CAD (coronary artery disease)  COPD (chronic obstructive pulmonary disease)  H/O chest tube placement      No Known Allergies      MEDICATIONS  (STANDING):  ALBUTerol/ipratropium for Nebulization 3 milliLiter(s) Nebulizer every 6 hours  aspirin enteric coated 81 milliGRAM(s) Oral daily  atorvastatin 80 milliGRAM(s) Oral at bedtime  buDESOnide 160 MICROgram(s)/formoterol 4.5 MICROgram(s) Inhaler 2 Puff(s) Inhalation two times a day  dextrose 5%. 1000 milliLiter(s) (50 mL/Hr) IV Continuous <Continuous>  dextrose 50% Injectable 12.5 Gram(s) IV Push once  dextrose 50% Injectable 25 Gram(s) IV Push once  dextrose 50% Injectable 25 Gram(s) IV Push once  heparin  Injectable 5000 Unit(s) SubCutaneous every 8 hours  influenza   Vaccine 0.5 milliLiter(s) IntraMuscular once  insulin glargine Injectable (LANTUS) 20 Unit(s) SubCutaneous at bedtime  insulin lispro (HumaLOG) corrective regimen sliding scale   SubCutaneous Before meals and at bedtime  insulin lispro Injectable (HumaLOG) 3 Unit(s) SubCutaneous three times a day before meals  metoprolol tartrate 25 milliGRAM(s) Oral two times a day    MEDICATIONS  (PRN):  dextrose 40% Gel 15 Gram(s) Oral once PRN Blood Glucose LESS THAN 70 milliGRAM(s)/deciliter  glucagon  Injectable 1 milliGRAM(s) IntraMuscular once PRN Glucose LESS THAN 70 milligrams/deciliter      On Beta Blocker? YES  Labs:                        15.5   9.5   )-----------( 340      ( 30 Sep 2018 07:57 )             47.0         133<L>  |  96  |  21  ----------------------------<  196<H>  4.5   |  25  |  0.77    Ca    9.7      30 Sep 2018 07:57  Mg     1.8         TPro  7.8  /  Alb  3.7  /  TBili  0.4  /  DBili  x   /  AST  12  /  ALT  13  /  AlkPhos  70      PT/INR - ( 29 Sep 2018 05:47 )   PT: 11.7 sec;   INR: 1.05          PTT - ( 29 Sep 2018 05:47 )  PTT:31.9 sec  Urinalysis Basic - ( 29 Sep 2018 05:25 )    Color: Yellow / Appearance: Clear / S.020 / pH: x  Gluc: x / Ketone: NEGATIVE  / Bili: Negative / Urobili: 0.2 E.U./dL   Blood: x / Protein: 30 mg/dL / Nitrite: NEGATIVE   Leuk Esterase: Small / RBC: < 5 /HPF / WBC < 5 /HPF   Sq Epi: x / Non Sq Epi: 0-5 /HPF / Bacteria: Present /HPF      ABO Interpretation: O (18 @ 12:42)    Hgb A1C: 8.8    EKG  < from: 12 Lead ECG (18 @ 08:13) >    Ventricular Rate 96 BPM    Atrial Rate 96 BPM    P-R Interval 182 ms    QRS Duration 140 ms    Q-T Interval 384 ms    QTC Calculation(Bezet) 485 ms    P Axis -19 degrees    R Axis -66 degrees    T Axis 56 degrees    Diagnosis Line Sinus rhythm withfrequent premature ventricular complexes  Left axis deviation  Right bundle branch block  Inferior infarct , age undetermined  Abnormal ECG    CXR:  PA/LAT: performed    CT Scans:  Thoracic aorta normal caliber and configuration. Mild atherosclerotic   calcification of the arch.  Multifocal areas of moderate varicoid bronchiectasis and bronchial wall   thickening suggestive of  probable chronic airway inflammation as well as areas of probable chronic   mucous plugging  predominantly involving the upper lobes, lingula and right middle lobe.    Cath Report:  ostial LAD: eccentric tubular 75% stenosis, prox LAD with eccentric tubular 405 stenosis and proc ramus eccentric tubular 70% stenosis, prox RCA of 20% stenosis and mid RCA of 95% stenosis and EF 45%.    Echo:  < from: TTE Echo w/Cont Complete (18 @ 11:08) >  Normal left ventricular size and wall thickness.There is mild global   hypokinesis of the left ventricle.The left ventricular ejection fraction   is   mildly reduced.The left ventricular ejection fraction is 49%. The right   ventricle is normal in size and function.The left atrial size is   normal.The   mitral inflow pattern is consistent with impaired left ventricular   relaxation   with mildly elevated left atrial pressure (8-14mmHg).  Right atrial size   is   normal.No evidence for anyhemodynamically significant valvular   disease.There   was insufficient TR detected from which to calculate pulmonary artery   systolic   pressure.  There is no pericardial effusion.There is a 0.6 cm x 0.6 cm   echogenicity seen within the mid inferolateral wall, which likely   represents a   small fibroma vs. a calcification.    PFT's:  performed  Carotid Duplex:  < from: US Duplex Carotid Arteries Complete, Bilateral (18 @ 14:49) >  No hemodynamically significant carotid artery stenosis.      Consult in Chart?  YES  Consent in Chart? YES  Pre-op Orders Placed? YES  Blood Prodeucts Ordered? YES  NPO ordered? YES Planned Date of Surgery: 10/01/18                                                                                                                Surgeon: MD Levar    Procedure: CABG    HPI:  This 53 year old male with strong family history of CAD/MI and PMH significant for COPD, bronchiectasis, h/o MAC, HTN, DM, and HTN presented to CT surgery with findings of CAD requiring for surgical intervention.  [place] [facility] [place] revealing significamt multi-vessel coronary disease involving ostial LAD: eccentric tubular 75% stenosis, prox LAD with eccentric tubular 405 stenosis and proc ramus eccentric tubular 70% stenosis, prox RCA of 20% stenosis and mid RCA of 95% stenosis and EF 45%.  PCI was attempted without success.  Patient is referred for surgical intervention.      PAST MEDICAL & SURGICAL HISTORY:  Diabetes  HLD (hyperlipidemia)  HTN (hypertension)  CAD (coronary artery disease)  COPD (chronic obstructive pulmonary disease)  H/O chest tube placement      No Known Allergies      MEDICATIONS  (STANDING):  ALBUTerol/ipratropium for Nebulization 3 milliLiter(s) Nebulizer every 6 hours  aspirin enteric coated 81 milliGRAM(s) Oral daily  atorvastatin 80 milliGRAM(s) Oral at bedtime  buDESOnide 160 MICROgram(s)/formoterol 4.5 MICROgram(s) Inhaler 2 Puff(s) Inhalation two times a day  dextrose 5%. 1000 milliLiter(s) (50 mL/Hr) IV Continuous <Continuous>  dextrose 50% Injectable 12.5 Gram(s) IV Push once  dextrose 50% Injectable 25 Gram(s) IV Push once  dextrose 50% Injectable 25 Gram(s) IV Push once  heparin  Injectable 5000 Unit(s) SubCutaneous every 8 hours  influenza   Vaccine 0.5 milliLiter(s) IntraMuscular once  insulin glargine Injectable (LANTUS) 20 Unit(s) SubCutaneous at bedtime  insulin lispro (HumaLOG) corrective regimen sliding scale   SubCutaneous Before meals and at bedtime  insulin lispro Injectable (HumaLOG) 3 Unit(s) SubCutaneous three times a day before meals  metoprolol tartrate 25 milliGRAM(s) Oral two times a day    MEDICATIONS  (PRN):  dextrose 40% Gel 15 Gram(s) Oral once PRN Blood Glucose LESS THAN 70 milliGRAM(s)/deciliter  glucagon  Injectable 1 milliGRAM(s) IntraMuscular once PRN Glucose LESS THAN 70 milligrams/deciliter      On Beta Blocker? YES  Labs:                        15.5   9.5   )-----------( 340      ( 30 Sep 2018 07:57 )             47.0         133<L>  |  96  |  21  ----------------------------<  196<H>  4.5   |  25  |  0.77    Ca    9.7      30 Sep 2018 07:57  Mg     1.8         TPro  7.8  /  Alb  3.7  /  TBili  0.4  /  DBili  x   /  AST  12  /  ALT  13  /  AlkPhos  70      PT/INR - ( 29 Sep 2018 05:47 )   PT: 11.7 sec;   INR: 1.05          PTT - ( 29 Sep 2018 05:47 )  PTT:31.9 sec  Urinalysis Basic - ( 29 Sep 2018 05:25 )    Color: Yellow / Appearance: Clear / S.020 / pH: x  Gluc: x / Ketone: NEGATIVE  / Bili: Negative / Urobili: 0.2 E.U./dL   Blood: x / Protein: 30 mg/dL / Nitrite: NEGATIVE   Leuk Esterase: Small / RBC: < 5 /HPF / WBC < 5 /HPF   Sq Epi: x / Non Sq Epi: 0-5 /HPF / Bacteria: Present /HPF    Physical exam:  neuro: alert and not in acute distress  CV: RRR without murmur  Pulm/chest: no wheezing.  no scar  abd: bowel sound and bowel movement intact  extremity: no edema  skin: eczema around joints and sacrum  vascular: pulses are intact bilaterally      ABO Interpretation: O (18 @ 12:42)    Hgb A1C: 8.8    EKG  < from: 12 Lead ECG (18 @ 08:13) >    Ventricular Rate 96 BPM    Atrial Rate 96 BPM    P-R Interval 182 ms    QRS Duration 140 ms    Q-T Interval 384 ms    QTC Calculation(Bezet) 485 ms    P Axis -19 degrees    R Axis -66 degrees    T Axis 56 degrees    Diagnosis Line Sinus rhythm withfrequent premature ventricular complexes  Left axis deviation  Right bundle branch block  Inferior infarct , age undetermined  Abnormal ECG    CXR:  PA/LAT: performed    CT Scans:  Thoracic aorta normal caliber and configuration. Mild atherosclerotic   calcification of the arch.  Multifocal areas of moderate varicoid bronchiectasis and bronchial wall   thickening suggestive of  probable chronic airway inflammation as well as areas of probable chronic   mucous plugging  predominantly involving the upper lobes, lingula and right middle lobe.    Cath Report:  ostial LAD: eccentric tubular 75% stenosis, prox LAD with eccentric tubular 405 stenosis and proc ramus eccentric tubular 70% stenosis, prox RCA of 20% stenosis and mid RCA of 95% stenosis and EF 45%.    Echo:  < from: TTE Echo w/Cont Complete (18 @ 11:08) >  Normal left ventricular size and wall thickness.There is mild global   hypokinesis of the left ventricle.The left ventricular ejection fraction   is   mildly reduced.The left ventricular ejection fraction is 49%. The right   ventricle is normal in size and function.The left atrial size is   normal.The   mitral inflow pattern is consistent with impaired left ventricular   relaxation   with mildly elevated left atrial pressure (8-14mmHg).  Right atrial size   is   normal.No evidence for anyhemodynamically significant valvular   disease.There   was insufficient TR detected from which to calculate pulmonary artery   systolic   pressure.  There is no pericardial effusion.There is a 0.6 cm x 0.6 cm   echogenicity seen within the mid inferolateral wall, which likely   represents a   small fibroma vs. a calcification.    PFT's:  performed  Carotid Duplex:  < from: US Duplex Carotid Arteries Complete, Bilateral (18 @ 14:49) >  No hemodynamically significant carotid artery stenosis.      Consult in Chart?  YES  Consent in Chart? YES  Pre-op Orders Placed? YES  Blood Prodeucts Ordered? YES  NPO ordered? YES

## 2018-09-30 NOTE — DIETITIAN INITIAL EVALUATION ADULT. - PROBLEM SELECTOR PLAN 1
- CP free  - s/p diagnostic cath at  on 9/27/18: 3VCAD: ostial LAD: eccentric tubular 75% stenosis, prox LAD: eccentric tubular 40% stenosis, prox Ramus: eccentric tubular 70% stenosis, prox RCA: tubular 20% stenosis, mid RCA: eccentric and calcified tubular 95% stenosis for which attempted PCI was unsuccessful due to heavy calcification at the RCA ostium, severely elevated EDP, diaphragmatic and posterobasal hypokinesis, EF 45%  - EKG at [facility] shows SR at 95BPM with RBBB (known), with no acute changes noted  - Per d/w Dr. Corrales, pt was loaded with ASA/Plavix at . Ordered for ASA 81mg and Plavix 75mg in AM  - NPO at midnight for staged PCI in AM with Dr. Corrales  - Precath/consent in chart  - Risks & benefits of procedure and alternative therapy have been explained to the patient including but not limited to: allergic reaction, bleeding w/possible need for blood transfusion, infection, renal and vascular compromise, limb damage, arrhythmia, stroke, vessel dissection/perforation, Myocardial infarction, emergent CABG. Informed consent obtained and in chart.  - Echo ordered

## 2018-09-30 NOTE — PROGRESS NOTE ADULT - PROBLEM SELECTOR PLAN 6
Lungs with rales anteriorly B/L and to LLL posteriorly  - SpO2 92-99% on RA  - Continue duoneb Q6hr standing and Symbicort 2 puffs BID    DVT ppx: Heparin SubQ. HOLD AM HEPARIN SUBQ on MONDAY AM  Dispo: NPO after MN on Sunday for MIDCAB with Dr. Cristobal with plan for patient to return in 4 weeks for staged PCI    Case d/w Dr. Manning and CTS team
Lungs clear this AM s/p lasix 40 mg IV x2 yesterday   - SpO2 92-99% on RA  - Continue duoneb Q6hr standing and Symbicort 2 puffs BID    DVT ppx: Heparin SubQ. HOLD AM HEPARIN SUBQ on MONDAY AM  Dispo: NPO after MN  for MIDCAB with Dr. Cristobal tomorrow with plan for patient to return in 4 weeks for staged PCI    Case d/w Dr. Manning and CTS team
Scattered expiratory wheezes on exam  - SpO2 92-99% on RA  - Continue duoneb Q6hr standing and Symbicort 2 puffs BID    DVT ppx: Heparin SubQ  Dispo: NPO after MN on Sunday for MIDCAB with Dr. Cristobal with plan for patient to return in 4 weeks for staged PCI    Case d/w Dr. Middleton and CTS team

## 2018-10-01 ENCOUNTER — APPOINTMENT (OUTPATIENT)
Dept: CARDIOTHORACIC SURGERY | Facility: HOSPITAL | Age: 53
End: 2018-10-01

## 2018-10-01 DIAGNOSIS — Z86.39 PERSONAL HISTORY OF OTHER ENDOCRINE, NUTRITIONAL AND METABOLIC DISEASE: ICD-10-CM

## 2018-10-01 DIAGNOSIS — I25.10 ATHEROSCLEROTIC HEART DISEASE OF NATIVE CORONARY ARTERY W/OUT ANGINA PECTORIS: ICD-10-CM

## 2018-10-01 DIAGNOSIS — Z82.49 FAMILY HISTORY OF ISCHEMIC HEART DISEASE AND OTHER DISEASES OF THE CIRCULATORY SYSTEM: ICD-10-CM

## 2018-10-01 DIAGNOSIS — Z87.09 PERSONAL HISTORY OF OTHER DISEASES OF THE RESPIRATORY SYSTEM: ICD-10-CM

## 2018-10-01 DIAGNOSIS — Z86.79 PERSONAL HISTORY OF OTHER DISEASES OF THE CIRCULATORY SYSTEM: ICD-10-CM

## 2018-10-01 DIAGNOSIS — Z09 ENCOUNTER FOR FOLLOW-UP EXAMINATION AFTER COMPLETED TREATMENT FOR CONDITIONS OTHER THAN MALIGNANT NEOPLASM: ICD-10-CM

## 2018-10-01 DIAGNOSIS — Z95.1 PRESENCE OF AORTOCORONARY BYPASS GRAFT: ICD-10-CM

## 2018-10-01 LAB
ALBUMIN SERPL ELPH-MCNC: 2.8 G/DL — LOW (ref 3.3–5)
ALP SERPL-CCNC: 60 U/L — SIGNIFICANT CHANGE UP (ref 40–120)
ALT FLD-CCNC: 19 U/L — SIGNIFICANT CHANGE UP (ref 10–45)
ANION GAP SERPL CALC-SCNC: 11 MMOL/L — SIGNIFICANT CHANGE UP (ref 5–17)
ANION GAP SERPL CALC-SCNC: 17 MMOL/L — SIGNIFICANT CHANGE UP (ref 5–17)
APTT BLD: 30.3 SEC — SIGNIFICANT CHANGE UP (ref 27.5–37.4)
AST SERPL-CCNC: 26 U/L — SIGNIFICANT CHANGE UP (ref 10–40)
BASE EXCESS BLDA CALC-SCNC: 2.1 MMOL/L — SIGNIFICANT CHANGE UP (ref -2–3)
BILIRUB SERPL-MCNC: 0.5 MG/DL — SIGNIFICANT CHANGE UP (ref 0.2–1.2)
BUN SERPL-MCNC: 14 MG/DL — SIGNIFICANT CHANGE UP (ref 7–23)
BUN SERPL-MCNC: 20 MG/DL — SIGNIFICANT CHANGE UP (ref 7–23)
CALCIUM SERPL-MCNC: 8.8 MG/DL — SIGNIFICANT CHANGE UP (ref 8.4–10.5)
CALCIUM SERPL-MCNC: 9.7 MG/DL — SIGNIFICANT CHANGE UP (ref 8.4–10.5)
CHLORIDE SERPL-SCNC: 93 MMOL/L — LOW (ref 96–108)
CHLORIDE SERPL-SCNC: 95 MMOL/L — LOW (ref 96–108)
CO2 SERPL-SCNC: 20 MMOL/L — LOW (ref 22–31)
CO2 SERPL-SCNC: 26 MMOL/L — SIGNIFICANT CHANGE UP (ref 22–31)
COHGB MFR BLDA: SIGNIFICANT CHANGE UP %
CREAT SERPL-MCNC: 0.71 MG/DL — SIGNIFICANT CHANGE UP (ref 0.5–1.3)
CREAT SERPL-MCNC: 0.77 MG/DL — SIGNIFICANT CHANGE UP (ref 0.5–1.3)
GAS PNL BLDA: SIGNIFICANT CHANGE UP
GAS PNL BLDA: SIGNIFICANT CHANGE UP
GLUCOSE BLDC GLUCOMTR-MCNC: 166 MG/DL — HIGH (ref 70–99)
GLUCOSE BLDC GLUCOMTR-MCNC: 213 MG/DL — HIGH (ref 70–99)
GLUCOSE SERPL-MCNC: 211 MG/DL — HIGH (ref 70–99)
GLUCOSE SERPL-MCNC: 247 MG/DL — HIGH (ref 70–99)
HCT VFR BLD CALC: 41.2 % — SIGNIFICANT CHANGE UP (ref 39–50)
HCT VFR BLD CALC: 46.2 % — SIGNIFICANT CHANGE UP (ref 39–50)
HGB BLD-MCNC: 13.5 G/DL — SIGNIFICANT CHANGE UP (ref 13–17)
HGB BLD-MCNC: 15.2 G/DL — SIGNIFICANT CHANGE UP (ref 13–17)
HGB BLDA-MCNC: SIGNIFICANT CHANGE UP G/DL (ref 13–17)
INR BLD: 1.12 — SIGNIFICANT CHANGE UP (ref 0.88–1.16)
LACTATE SERPL-SCNC: 1.5 MMOL/L — SIGNIFICANT CHANGE UP (ref 0.5–2)
LACTATE SERPL-SCNC: 1.7 MMOL/L — SIGNIFICANT CHANGE UP (ref 0.5–2)
MAGNESIUM SERPL-MCNC: 1.3 MG/DL — LOW (ref 1.6–2.6)
MAGNESIUM SERPL-MCNC: 1.8 MG/DL — SIGNIFICANT CHANGE UP (ref 1.6–2.6)
MCHC RBC-ENTMCNC: 28.1 PG — SIGNIFICANT CHANGE UP (ref 27–34)
MCHC RBC-ENTMCNC: 29.2 PG — SIGNIFICANT CHANGE UP (ref 27–34)
MCHC RBC-ENTMCNC: 32.8 G/DL — SIGNIFICANT CHANGE UP (ref 32–36)
MCHC RBC-ENTMCNC: 32.9 G/DL — SIGNIFICANT CHANGE UP (ref 32–36)
MCV RBC AUTO: 85.8 FL — SIGNIFICANT CHANGE UP (ref 80–100)
MCV RBC AUTO: 88.7 FL — SIGNIFICANT CHANGE UP (ref 80–100)
METHGB MFR BLDA: 0.3 % — SIGNIFICANT CHANGE UP
O2 CT VFR BLDA CALC: SIGNIFICANT CHANGE UP (ref 15–23)
OXYHGB MFR BLDA: SIGNIFICANT CHANGE UP % (ref 94–100)
PCO2 BLDA: 37 MMHG — SIGNIFICANT CHANGE UP (ref 35–48)
PH BLDA: 7.4 — SIGNIFICANT CHANGE UP (ref 7.35–7.45)
PHOSPHATE SERPL-MCNC: 2.1 MG/DL — LOW (ref 2.5–4.5)
PLATELET # BLD AUTO: 271 K/UL — SIGNIFICANT CHANGE UP (ref 150–400)
PLATELET # BLD AUTO: 324 K/UL — SIGNIFICANT CHANGE UP (ref 150–400)
PO2 BLDA: 181 MMHG — HIGH (ref 83–108)
POTASSIUM SERPL-MCNC: 4.4 MMOL/L — SIGNIFICANT CHANGE UP (ref 3.5–5.3)
POTASSIUM SERPL-MCNC: 4.4 MMOL/L — SIGNIFICANT CHANGE UP (ref 3.5–5.3)
POTASSIUM SERPL-SCNC: 4.4 MMOL/L — SIGNIFICANT CHANGE UP (ref 3.5–5.3)
POTASSIUM SERPL-SCNC: 4.4 MMOL/L — SIGNIFICANT CHANGE UP (ref 3.5–5.3)
PROT SERPL-MCNC: 6.6 G/DL — SIGNIFICANT CHANGE UP (ref 6–8.3)
PROTHROM AB SERPL-ACNC: 12.5 SEC — SIGNIFICANT CHANGE UP (ref 9.8–12.7)
RBC # BLD: 4.8 M/UL — SIGNIFICANT CHANGE UP (ref 4.2–5.8)
RBC # BLD: 5.21 M/UL — SIGNIFICANT CHANGE UP (ref 4.2–5.8)
RBC # FLD: 12.5 % — SIGNIFICANT CHANGE UP (ref 10.3–16.9)
RBC # FLD: 12.6 % — SIGNIFICANT CHANGE UP (ref 10.3–16.9)
SAO2 % BLDA: 99 % — SIGNIFICANT CHANGE UP (ref 95–100)
SODIUM SERPL-SCNC: 130 MMOL/L — LOW (ref 135–145)
SODIUM SERPL-SCNC: 132 MMOL/L — LOW (ref 135–145)
WBC # BLD: 15.7 K/UL — HIGH (ref 3.8–10.5)
WBC # BLD: 9.1 K/UL — SIGNIFICANT CHANGE UP (ref 3.8–10.5)
WBC # FLD AUTO: 15.7 K/UL — HIGH (ref 3.8–10.5)
WBC # FLD AUTO: 9.1 K/UL — SIGNIFICANT CHANGE UP (ref 3.8–10.5)

## 2018-10-01 PROCEDURE — 76998 US GUIDE INTRAOP: CPT | Mod: 26,AS

## 2018-10-01 PROCEDURE — 33533 CABG ARTERIAL SINGLE: CPT | Mod: AS

## 2018-10-01 PROCEDURE — 76998 US GUIDE INTRAOP: CPT | Mod: 26,59

## 2018-10-01 PROCEDURE — 71045 X-RAY EXAM CHEST 1 VIEW: CPT | Mod: 26

## 2018-10-01 PROCEDURE — 33533 CABG ARTERIAL SINGLE: CPT

## 2018-10-01 PROCEDURE — 99291 CRITICAL CARE FIRST HOUR: CPT

## 2018-10-01 RX ORDER — MAGNESIUM SULFATE 500 MG/ML
4 VIAL (ML) INJECTION ONCE
Qty: 0 | Refills: 0 | Status: COMPLETED | OUTPATIENT
Start: 2018-10-01 | End: 2018-10-01

## 2018-10-01 RX ORDER — PANTOPRAZOLE SODIUM 20 MG/1
40 TABLET, DELAYED RELEASE ORAL DAILY
Qty: 0 | Refills: 0 | Status: DISCONTINUED | OUTPATIENT
Start: 2018-10-01 | End: 2018-10-01

## 2018-10-01 RX ORDER — CEFAZOLIN SODIUM 1 G
2000 VIAL (EA) INJECTION EVERY 8 HOURS
Qty: 0 | Refills: 0 | Status: DISCONTINUED | OUTPATIENT
Start: 2018-10-01 | End: 2018-10-01

## 2018-10-01 RX ORDER — ACETAMINOPHEN 500 MG
1000 TABLET ORAL ONCE
Qty: 0 | Refills: 0 | Status: DISCONTINUED | OUTPATIENT
Start: 2018-10-01 | End: 2018-10-01

## 2018-10-01 RX ORDER — SODIUM CHLORIDE 9 MG/ML
1000 INJECTION INTRAMUSCULAR; INTRAVENOUS; SUBCUTANEOUS
Qty: 0 | Refills: 0 | Status: DISCONTINUED | OUTPATIENT
Start: 2018-10-01 | End: 2018-10-03

## 2018-10-01 RX ORDER — CLOPIDOGREL BISULFATE 75 MG/1
75 TABLET, FILM COATED ORAL DAILY
Qty: 0 | Refills: 0 | Status: DISCONTINUED | OUTPATIENT
Start: 2018-10-01 | End: 2018-10-01

## 2018-10-01 RX ORDER — DEXTROSE 50 % IN WATER 50 %
50 SYRINGE (ML) INTRAVENOUS
Qty: 0 | Refills: 0 | Status: DISCONTINUED | OUTPATIENT
Start: 2018-10-01 | End: 2018-10-01

## 2018-10-01 RX ORDER — CHLORHEXIDINE GLUCONATE 213 G/1000ML
5 SOLUTION TOPICAL EVERY 4 HOURS
Qty: 0 | Refills: 0 | Status: DISCONTINUED | OUTPATIENT
Start: 2018-10-01 | End: 2018-10-02

## 2018-10-01 RX ORDER — PROPOFOL 10 MG/ML
5 INJECTION, EMULSION INTRAVENOUS
Qty: 1000 | Refills: 0 | Status: DISCONTINUED | OUTPATIENT
Start: 2018-10-01 | End: 2018-10-02

## 2018-10-01 RX ORDER — DEXMEDETOMIDINE HYDROCHLORIDE IN 0.9% SODIUM CHLORIDE 4 UG/ML
0.02 INJECTION INTRAVENOUS
Qty: 200 | Refills: 0 | Status: DISCONTINUED | OUTPATIENT
Start: 2018-10-01 | End: 2018-10-02

## 2018-10-01 RX ORDER — SODIUM CHLORIDE 9 MG/ML
1000 INJECTION INTRAMUSCULAR; INTRAVENOUS; SUBCUTANEOUS
Qty: 0 | Refills: 0 | Status: DISCONTINUED | OUTPATIENT
Start: 2018-10-01 | End: 2018-10-01

## 2018-10-01 RX ORDER — ALBUMIN HUMAN 25 %
250 VIAL (ML) INTRAVENOUS ONCE
Qty: 0 | Refills: 0 | Status: COMPLETED | OUTPATIENT
Start: 2018-10-01 | End: 2018-10-01

## 2018-10-01 RX ORDER — ATORVASTATIN CALCIUM 80 MG/1
40 TABLET, FILM COATED ORAL AT BEDTIME
Qty: 0 | Refills: 0 | Status: DISCONTINUED | OUTPATIENT
Start: 2018-10-01 | End: 2018-10-01

## 2018-10-01 RX ORDER — PANTOPRAZOLE SODIUM 20 MG/1
40 TABLET, DELAYED RELEASE ORAL
Qty: 0 | Refills: 0 | Status: DISCONTINUED | OUTPATIENT
Start: 2018-10-01 | End: 2018-10-02

## 2018-10-01 RX ORDER — CEFAZOLIN SODIUM 1 G
2000 VIAL (EA) INJECTION EVERY 8 HOURS
Qty: 0 | Refills: 0 | Status: DISCONTINUED | OUTPATIENT
Start: 2018-10-01 | End: 2018-10-02

## 2018-10-01 RX ORDER — BUPIVACAINE 13.3 MG/ML
20 INJECTION, SUSPENSION, LIPOSOMAL INFILTRATION ONCE
Qty: 0 | Refills: 0 | Status: DISCONTINUED | OUTPATIENT
Start: 2018-10-01 | End: 2018-10-01

## 2018-10-01 RX ORDER — CHLORHEXIDINE GLUCONATE 213 G/1000ML
1 SOLUTION TOPICAL DAILY
Qty: 0 | Refills: 0 | Status: DISCONTINUED | OUTPATIENT
Start: 2018-10-01 | End: 2018-10-04

## 2018-10-01 RX ORDER — ASPIRIN/CALCIUM CARB/MAGNESIUM 324 MG
81 TABLET ORAL DAILY
Qty: 0 | Refills: 0 | Status: DISCONTINUED | OUTPATIENT
Start: 2018-10-01 | End: 2018-10-01

## 2018-10-01 RX ORDER — HEPARIN SODIUM 5000 [USP'U]/ML
5000 INJECTION INTRAVENOUS; SUBCUTANEOUS EVERY 8 HOURS
Qty: 0 | Refills: 0 | Status: DISCONTINUED | OUTPATIENT
Start: 2018-10-01 | End: 2018-10-01

## 2018-10-01 RX ORDER — CALCIUM GLUCONATE 100 MG/ML
2 VIAL (ML) INTRAVENOUS ONCE
Qty: 0 | Refills: 0 | Status: COMPLETED | OUTPATIENT
Start: 2018-10-01 | End: 2018-10-01

## 2018-10-01 RX ORDER — CHLORHEXIDINE GLUCONATE 213 G/1000ML
5 SOLUTION TOPICAL EVERY 4 HOURS
Qty: 0 | Refills: 0 | Status: DISCONTINUED | OUTPATIENT
Start: 2018-10-01 | End: 2018-10-01

## 2018-10-01 RX ORDER — INSULIN HUMAN 100 [IU]/ML
1 INJECTION, SOLUTION SUBCUTANEOUS
Qty: 50 | Refills: 0 | Status: DISCONTINUED | OUTPATIENT
Start: 2018-10-01 | End: 2018-10-02

## 2018-10-01 RX ORDER — DEXTROSE 50 % IN WATER 50 %
50 SYRINGE (ML) INTRAVENOUS
Qty: 0 | Refills: 0 | Status: DISCONTINUED | OUTPATIENT
Start: 2018-10-01 | End: 2018-10-04

## 2018-10-01 RX ORDER — METOCLOPRAMIDE HCL 10 MG
10 TABLET ORAL ONCE
Qty: 0 | Refills: 0 | Status: COMPLETED | OUTPATIENT
Start: 2018-10-01 | End: 2018-10-01

## 2018-10-01 RX ORDER — ACETAMINOPHEN 500 MG
1000 TABLET ORAL ONCE
Qty: 0 | Refills: 0 | Status: COMPLETED | OUTPATIENT
Start: 2018-10-01 | End: 2018-10-02

## 2018-10-01 RX ORDER — DEXTROSE 50 % IN WATER 50 %
25 SYRINGE (ML) INTRAVENOUS
Qty: 0 | Refills: 0 | Status: DISCONTINUED | OUTPATIENT
Start: 2018-10-01 | End: 2018-10-01

## 2018-10-01 RX ORDER — CLOPIDOGREL BISULFATE 75 MG/1
75 TABLET, FILM COATED ORAL DAILY
Qty: 0 | Refills: 0 | Status: DISCONTINUED | OUTPATIENT
Start: 2018-10-01 | End: 2018-10-04

## 2018-10-01 RX ORDER — INSULIN HUMAN 100 [IU]/ML
1 INJECTION, SOLUTION SUBCUTANEOUS
Qty: 50 | Refills: 0 | Status: DISCONTINUED | OUTPATIENT
Start: 2018-10-01 | End: 2018-10-01

## 2018-10-01 RX ORDER — PANTOPRAZOLE SODIUM 20 MG/1
40 TABLET, DELAYED RELEASE ORAL DAILY
Qty: 0 | Refills: 0 | Status: DISCONTINUED | OUTPATIENT
Start: 2018-10-01 | End: 2018-10-02

## 2018-10-01 RX ORDER — DEXTROSE 50 % IN WATER 50 %
25 SYRINGE (ML) INTRAVENOUS
Qty: 0 | Refills: 0 | Status: DISCONTINUED | OUTPATIENT
Start: 2018-10-01 | End: 2018-10-04

## 2018-10-01 RX ORDER — HEPARIN SODIUM 5000 [USP'U]/ML
5000 INJECTION INTRAVENOUS; SUBCUTANEOUS EVERY 8 HOURS
Qty: 0 | Refills: 0 | Status: DISCONTINUED | OUTPATIENT
Start: 2018-10-01 | End: 2018-10-04

## 2018-10-01 RX ORDER — ASPIRIN/CALCIUM CARB/MAGNESIUM 324 MG
81 TABLET ORAL DAILY
Qty: 0 | Refills: 0 | Status: DISCONTINUED | OUTPATIENT
Start: 2018-10-01 | End: 2018-10-04

## 2018-10-01 RX ORDER — FENTANYL CITRATE 50 UG/ML
25 INJECTION INTRAVENOUS EVERY 4 HOURS
Qty: 0 | Refills: 0 | Status: DISCONTINUED | OUTPATIENT
Start: 2018-10-01 | End: 2018-10-02

## 2018-10-01 RX ADMIN — CHLORHEXIDINE GLUCONATE 1 APPLICATION(S): 213 SOLUTION TOPICAL at 05:14

## 2018-10-01 RX ADMIN — Medication 200 GRAM(S): at 22:52

## 2018-10-01 RX ADMIN — FENTANYL CITRATE 25 MICROGRAM(S): 50 INJECTION INTRAVENOUS at 20:10

## 2018-10-01 RX ADMIN — PROPOFOL 2.23 MICROGRAM(S)/KG/MIN: 10 INJECTION, EMULSION INTRAVENOUS at 19:50

## 2018-10-01 RX ADMIN — Medication 62.5 MILLIMOLE(S): at 23:54

## 2018-10-01 RX ADMIN — Medication 25 MILLIGRAM(S): at 06:06

## 2018-10-01 RX ADMIN — CHLORHEXIDINE GLUCONATE 5 MILLILITER(S): 213 SOLUTION TOPICAL at 21:34

## 2018-10-01 RX ADMIN — Medication 81 MILLIGRAM(S): at 22:10

## 2018-10-01 RX ADMIN — BUDESONIDE AND FORMOTEROL FUMARATE DIHYDRATE 2 PUFF(S): 160; 4.5 AEROSOL RESPIRATORY (INHALATION) at 06:06

## 2018-10-01 RX ADMIN — Medication 100 GRAM(S): at 22:30

## 2018-10-01 RX ADMIN — Medication 10 MILLIGRAM(S): at 23:02

## 2018-10-01 RX ADMIN — ATORVASTATIN CALCIUM 80 MILLIGRAM(S): 80 TABLET, FILM COATED ORAL at 21:34

## 2018-10-01 RX ADMIN — Medication 2: at 07:28

## 2018-10-01 RX ADMIN — Medication 81 MILLIGRAM(S): at 11:17

## 2018-10-01 RX ADMIN — Medication 2000 MILLIGRAM(S): at 23:42

## 2018-10-01 RX ADMIN — Medication 125 MILLILITER(S): at 22:00

## 2018-10-01 RX ADMIN — PANTOPRAZOLE SODIUM 40 MILLIGRAM(S): 20 TABLET, DELAYED RELEASE ORAL at 21:25

## 2018-10-01 RX ADMIN — Medication 3 MILLILITER(S): at 06:06

## 2018-10-01 RX ADMIN — Medication 3 MILLILITER(S): at 11:17

## 2018-10-01 RX ADMIN — FENTANYL CITRATE 25 MICROGRAM(S): 50 INJECTION INTRAVENOUS at 19:50

## 2018-10-01 RX ADMIN — CHLORHEXIDINE GLUCONATE 20 MILLILITER(S): 213 SOLUTION TOPICAL at 06:06

## 2018-10-01 NOTE — BRIEF OPERATIVE NOTE - PROCEDURE
<<-----Click on this checkbox to enter Procedure GANESH, robot assisted  10/01/2018  lima-lad  Active  AMACOSKEY

## 2018-10-01 NOTE — PROGRESS NOTE ADULT - SUBJECTIVE AND OBJECTIVE BOX
CTICU  CRITICAL  CARE  attending     Hand off received 					   Pertinent clinical, laboratory, radiographic, hemodynamic, echocardiographic, respiratory data, microbiologic data and chart were reviewed and analyzed frequently throughout the course of the day and night  Patient seen and examined with CTS/ SH attending at bedside  53 years old male with DM, HTN, HLD, PSORIASIS, COPD, strong FamHx of CAD/MI (4 grandparents  from MI, father  from MI, sister s/p CABG). He  was initially referred to  for cardiac cath due to abnormal stress test on 18, which as per transfer notes from , showed small amount of ischemia in the apical region. He states his PCP, referred him for stress test (unsure why though per pt) which was abnormal prompting him to proceed with elective cath at . Pt is now s/p diagnostic cardiac cath at  on 18 revealing significant 3VCAD: ostial LAD: eccentric tubular 75% stenosis, prox LAD: eccentric tubular 40% stenosis, prox Ramus: eccentric tubular 70% stenosis, prox RCA: tubular 20% stenosis, mid RCA: eccentric and calcified tubular 95% stenosis for which attempted PCI was unsuccessful due to heavy calcification at the RCA ostium, severely elevated EDP, diaphragmatic and posterobasal hypokinesis, EF 45%. Pt was deemed medically stable for transfer to [facility] on 18 with plan for staged PCI on 18 with Dr. Corrales. Upon arrival to [facility], VS noted as 144/74, HR 92, RR 18, SpO2 99% on RA. EKG shows SR at 95BPM with RBBB (known), with no acute changes noted. On interview with Prasanth HOGAN, pt endorses CID with walking up 2 flights of stairs which he attributes to his COPD. Pt denies any HA, dizziness, syncope, CP, palpitations, SOB at rest, n/v/d, diaphoresis, dysuria, fevers, chills, melena, LE edema, or any other symptoms at this time. As per d/w Dr. Corrales, pt was already loaded with ASA/Plavix prior to procedure at  and will receive ASA 81mg and Plavix 75mg on the morning of 18 prior to staged PCI. Unable to deploy a stent in RCA.      HEALTH ISSUES - PROBLEM Dx:  HFrEF (heart failure with reduced ejection fraction): HFrEF (heart failure with reduced ejection fraction)  COPD (chronic obstructive pulmonary disease): COPD (chronic obstructive pulmonary disease)  Diabetes: Diabetes  HTN (hypertension): HTN (hypertension)  HLD (hyperlipidemia): HLD (hyperlipidemia)  CAD (coronary artery disease): CAD (coronary artery disease)      FAMILY HISTORY:  Family history of CABG (Sibling)  Family history of acute myocardial infarction (Father, Grandparent)  PAST MEDICAL & SURGICAL HISTORY:  Diabetes  HLD (hyperlipidemia)  HTN (hypertension)  CAD (coronary artery disease)  COPD (chronic obstructive pulmonary disease)  H/O chest tube placement    Patient is a 53y old  Male who presents with a chief complaint of Transfer from  for staged PCI of RCA (30 Sep 2018 13:00)      14 system review was unremarkable  acute changes include acute respiratory failure  Vital signs, hemodynamic and respiratory parameters were reviewed from the bedside nursing flow sheet.  ICU Vital Signs Last 24 Hrs  T(C): 36.4 (01 Oct 2018 22:00), Max: 36.7 (01 Oct 2018 01:00)  T(F): 97.5 (01 Oct 2018 22:00), Max: 98 (01 Oct 2018 01:00)  HR: 96 (01 Oct 2018 22:00) (85 - 102)  BP: 120/70 (01 Oct 2018 11:50) (114/60 - 120/70)  BP(mean): 88 (01 Oct 2018 11:50) (80 - 88)  ABP: 136/68 (01 Oct 2018 22:00) (110/58 - 188/100)  ABP(mean): 92 (01 Oct 2018 22:00) (78 - 138)  RR: 17 (01 Oct 2018 22:00) (15 - 20)  SpO2: 100% (01 Oct 2018 22:00) (94% - 100%)    Adult Advanced Hemodynamics Last 24 Hrs  CVP(mm Hg): 4 (01 Oct 2018 22:00) (4 - 7)  CVP(cm H2O): --  CO: --  CI: --  PA: --  PA(mean): --  PCWP: --  SVR: --  SVRI: --  PVR: --  PVRI: --, ABG - ( 01 Oct 2018 21:18 )  pH, Arterial: 7.46  pH, Blood: x     /  pCO2: 29    /  pO2: 145   / HCO3: 20    / Base Excess: -2.6  /  SaO2: 98                Mode: AC/ CMV (Assist Control/ Continuous Mandatory Ventilation)  RR (machine): 16  TV (machine): 600  FiO2: 50  PEEP: 5  ITime: 1  MAP: 14  PIP: 40    Intake and output was reviewed and the fluid balance was calculated  Daily     Daily Weight in k.6 (01 Oct 2018 07:19)  I&O's Summary    30 Sep 2018 07:  -  01 Oct 2018 07:00  --------------------------------------------------------  IN: 680 mL / OUT: 1400 mL / NET: -720 mL    01 Oct 2018 07:  -  01 Oct 2018 22:04  --------------------------------------------------------  IN: 63.4 mL / OUT: 1095 mL / NET: -1031.6 mL        All lines and drain sites were assessed  Glycemic trend was reviewed CAPILLARY BLOOD GLUCOSE      POCT Blood Glucose.: 213 mg/dL (01 Oct 2018 21:15)      NEURO: No acute change in mental status. PUPILS 2 mm (Reactive).   CVS: S1, S2, No S3.  RESPIRATORY: Auscultation of the chest reveals equal breath sounds.  GI: Abdomen is soft  Extremities are warm and well perfused.  VASCULAR: + Distal pulses.  Wounds appear clean and unremarkable  Antibiotics are perioperative cefazolin.  SKIN: Diffuse healed psoriatic lesions in upper and lower extremities and torso.    labs  CBC Full  -  ( 01 Oct 2018 21:20 )  WBC Count : 15.7 K/uL  Hemoglobin : 13.5 g/dL  Hematocrit : 41.2 %  Platelet Count - Automated : 271 K/uL  Mean Cell Volume : 85.8 fL  Mean Cell Hemoglobin : 28.1 pg  Mean Cell Hemoglobin Concentration : 32.8 g/dL  Auto Neutrophil # : x  Auto Lymphocyte # : x  Auto Monocyte # : x  Auto Eosinophil # : x  Auto Basophil # : x  Auto Neutrophil % : x  Auto Lymphocyte % : x  Auto Monocyte % : x  Auto Eosinophil % : x  Auto Basophil % : x    10-01    130<L>  |  93<L>  |  14  ----------------------------<  247<H>  4.4   |  20<L>  |  0.71    Ca    8.8      01 Oct 2018 21:20  Phos  2.1     10  Mg     1.3     10-01    TPro  6.6  /  Alb  2.8<L>  /  TBili  0.5  /  DBili  x   /  AST  26  /  ALT  19  /  AlkPhos  60  10-01    PT/INR - ( 01 Oct 2018 21:20 )   PT: 12.5 sec;   INR: 1.12          PTT - ( 01 Oct 2018 21:20 )  PTT:30.3 sec  The current medications were reviewed   MEDICATIONS  (STANDING):  acetaminophen  IVPB .. 1000 milliGRAM(s) IV Intermittent once  aspirin enteric coated 81 milliGRAM(s) Oral daily  atorvastatin 80 milliGRAM(s) Oral at bedtime  buDESOnide 160 MICROgram(s)/formoterol 4.5 MICROgram(s) Inhaler 2 Puff(s) Inhalation two times a day  ceFAZolin  Injectable. 2000 milliGRAM(s) IV Push every 8 hours  chlorhexidine 0.12% Liquid 5 milliLiter(s) Swish and Spit every 4 hours  chlorhexidine 2% Cloths 1 Application(s) Topical daily  clopidogrel Tablet 75 milliGRAM(s) Oral daily  dexmedetomidine Infusion 0.02 MICROgram(s)/kG/Hr (0.372 mL/Hr) IV Continuous <Continuous>  dextrose 50% Injectable 50 milliLiter(s) IV Push every 15 minutes  dextrose 50% Injectable 25 milliLiter(s) IV Push every 15 minutes  heparin  Injectable 5000 Unit(s) SubCutaneous every 8 hours  influenza   Vaccine 0.5 milliLiter(s) IntraMuscular once  insulin Infusion 1 Unit(s)/Hr (1 mL/Hr) IV Continuous <Continuous>  magnesium sulfate  IVPB 4 Gram(s) IV Intermittent once  metoclopramide Injectable 10 milliGRAM(s) IV Push once  pantoprazole    Tablet 40 milliGRAM(s) Oral before breakfast  pantoprazole  Injectable 40 milliGRAM(s) IV Push daily  propofol Infusion 5 MICROgram(s)/kG/Min (2.235 mL/Hr) IV Continuous <Continuous>  sodium chloride 0.9%. 1000 milliLiter(s) (10 mL/Hr) IV Continuous <Continuous>  sodium phosphate IVPB 15 milliMole(s) IV Intermittent once    MEDICATIONS  (PRN):  fentaNYL    Injectable 25 MICROGram(s) IV Push every 4 hours PRN Severe Pain (7 - 10)       PROBLEM LIST/ ASSESSMENT:  HEALTH ISSUES - PROBLEM Dx:  HFrEF (heart failure with reduced ejection fraction): HFrEF (heart failure with reduced ejection fraction)  COPD (chronic obstructive pulmonary disease): COPD (chronic obstructive pulmonary disease)  Diabetes: Diabetes  HTN (hypertension): HTN (hypertension)  HLD (hyperlipidemia): HLD (hyperlipidemia)  CAD (coronary artery disease): CAD (coronary artery disease)      S/P CABG  Hemodynamically stable.  Brisk Diuresis.  Good oxygenation.  Overall doing well.      My plan includes :  D/C norepinephrine.  Close hemodynamic, ventilatory and drain monitoring and management.  WEAN to Extubate.  Topical ointment for psoriasis followed by dermatology recommendations.  Monitor for arrhythmias and monitor parameters for organ perfusion  Monitor neurologic status  Head of the bed should remain elevated to 45 deg .   Chest PT and IS will be encouraged  Monitor adequacy of oxygenation and ventilation and attempt to wean oxygen  Nutritional goals will be met using po eventually , ensure adequate caloric intake and monitor the same  Stress ulcer and VTE prophylaxis will be achieved    Glycemic control is satisfactory  Electrolytes have been repleted as necessary and wound care has been carried out. Pain control has been achieved.   Aggressive physical therapy and early mobility and ambulation goals will be met   The family was updated about the course and plan  CRITICAL CARE TIME SPENT in evaluation and management, reassessments, review and interpretation of labs and x-rays, ventilator and hemodynamic management, formulating a plan and coordinating care: ___60____ MIN.  Time does not include procedural time.  CTICU ATTENDING     					    Sivakumar Winn MD

## 2018-10-02 LAB
ALBUMIN SERPL ELPH-MCNC: 3.5 G/DL — SIGNIFICANT CHANGE UP (ref 3.3–5)
ALBUMIN SERPL ELPH-MCNC: 4.1 G/DL — SIGNIFICANT CHANGE UP (ref 3.3–5)
ALBUMIN SERPL ELPH-MCNC: 4.4 G/DL — SIGNIFICANT CHANGE UP (ref 3.3–5)
ALP SERPL-CCNC: 57 U/L — SIGNIFICANT CHANGE UP (ref 40–120)
ALP SERPL-CCNC: 58 U/L — SIGNIFICANT CHANGE UP (ref 40–120)
ALP SERPL-CCNC: 59 U/L — SIGNIFICANT CHANGE UP (ref 40–120)
ALT FLD-CCNC: 19 U/L — SIGNIFICANT CHANGE UP (ref 10–45)
ALT FLD-CCNC: 19 U/L — SIGNIFICANT CHANGE UP (ref 10–45)
ALT FLD-CCNC: 23 U/L — SIGNIFICANT CHANGE UP (ref 10–45)
ANION GAP SERPL CALC-SCNC: 11 MMOL/L — SIGNIFICANT CHANGE UP (ref 5–17)
ANION GAP SERPL CALC-SCNC: 13 MMOL/L — SIGNIFICANT CHANGE UP (ref 5–17)
ANION GAP SERPL CALC-SCNC: 13 MMOL/L — SIGNIFICANT CHANGE UP (ref 5–17)
APTT BLD: 23.1 SEC — LOW (ref 27.5–37.4)
APTT BLD: 31.4 SEC — SIGNIFICANT CHANGE UP (ref 27.5–37.4)
APTT BLD: 31.7 SEC — SIGNIFICANT CHANGE UP (ref 27.5–37.4)
AST SERPL-CCNC: 21 U/L — SIGNIFICANT CHANGE UP (ref 10–40)
AST SERPL-CCNC: 24 U/L — SIGNIFICANT CHANGE UP (ref 10–40)
AST SERPL-CCNC: 25 U/L — SIGNIFICANT CHANGE UP (ref 10–40)
BASE EXCESS BLDV CALC-SCNC: -0.1 MMOL/L — SIGNIFICANT CHANGE UP
BILIRUB DIRECT SERPL-MCNC: 0.2 MG/DL — SIGNIFICANT CHANGE UP (ref 0–0.2)
BILIRUB INDIRECT FLD-MCNC: 0.4 MG/DL — SIGNIFICANT CHANGE UP (ref 0.2–1)
BILIRUB SERPL-MCNC: 0.4 MG/DL — SIGNIFICANT CHANGE UP (ref 0.2–1.2)
BILIRUB SERPL-MCNC: 0.5 MG/DL — SIGNIFICANT CHANGE UP (ref 0.2–1.2)
BILIRUB SERPL-MCNC: 0.6 MG/DL — SIGNIFICANT CHANGE UP (ref 0.2–1.2)
BUN SERPL-MCNC: 12 MG/DL — SIGNIFICANT CHANGE UP (ref 7–23)
BUN SERPL-MCNC: 13 MG/DL — SIGNIFICANT CHANGE UP (ref 7–23)
BUN SERPL-MCNC: 14 MG/DL — SIGNIFICANT CHANGE UP (ref 7–23)
CALCIUM SERPL-MCNC: 10.1 MG/DL — SIGNIFICANT CHANGE UP (ref 8.4–10.5)
CALCIUM SERPL-MCNC: 9 MG/DL — SIGNIFICANT CHANGE UP (ref 8.4–10.5)
CALCIUM SERPL-MCNC: 9.4 MG/DL — SIGNIFICANT CHANGE UP (ref 8.4–10.5)
CHLORIDE SERPL-SCNC: 89 MMOL/L — LOW (ref 96–108)
CHLORIDE SERPL-SCNC: 90 MMOL/L — LOW (ref 96–108)
CHLORIDE SERPL-SCNC: 91 MMOL/L — LOW (ref 96–108)
CO2 SERPL-SCNC: 25 MMOL/L — SIGNIFICANT CHANGE UP (ref 22–31)
CO2 SERPL-SCNC: 25 MMOL/L — SIGNIFICANT CHANGE UP (ref 22–31)
CO2 SERPL-SCNC: 26 MMOL/L — SIGNIFICANT CHANGE UP (ref 22–31)
CREAT SERPL-MCNC: 0.63 MG/DL — SIGNIFICANT CHANGE UP (ref 0.5–1.3)
CREAT SERPL-MCNC: 0.65 MG/DL — SIGNIFICANT CHANGE UP (ref 0.5–1.3)
CREAT SERPL-MCNC: 0.76 MG/DL — SIGNIFICANT CHANGE UP (ref 0.5–1.3)
GAS PNL BLDA: SIGNIFICANT CHANGE UP
GAS PNL BLDV: SIGNIFICANT CHANGE UP
GLUCOSE BLDC GLUCOMTR-MCNC: 134 MG/DL — HIGH (ref 70–99)
GLUCOSE BLDC GLUCOMTR-MCNC: 137 MG/DL — HIGH (ref 70–99)
GLUCOSE BLDC GLUCOMTR-MCNC: 147 MG/DL — HIGH (ref 70–99)
GLUCOSE BLDC GLUCOMTR-MCNC: 155 MG/DL — HIGH (ref 70–99)
GLUCOSE BLDC GLUCOMTR-MCNC: 157 MG/DL — HIGH (ref 70–99)
GLUCOSE BLDC GLUCOMTR-MCNC: 181 MG/DL — HIGH (ref 70–99)
GLUCOSE BLDC GLUCOMTR-MCNC: 189 MG/DL — HIGH (ref 70–99)
GLUCOSE BLDC GLUCOMTR-MCNC: 206 MG/DL — HIGH (ref 70–99)
GLUCOSE BLDC GLUCOMTR-MCNC: 213 MG/DL — HIGH (ref 70–99)
GLUCOSE BLDC GLUCOMTR-MCNC: 309 MG/DL — HIGH (ref 70–99)
GLUCOSE BLDC GLUCOMTR-MCNC: 326 MG/DL — HIGH (ref 70–99)
GLUCOSE SERPL-MCNC: 237 MG/DL — HIGH (ref 70–99)
GLUCOSE SERPL-MCNC: 264 MG/DL — HIGH (ref 70–99)
GLUCOSE SERPL-MCNC: 366 MG/DL — HIGH (ref 70–99)
HCO3 BLDV-SCNC: 26 MMOL/L — SIGNIFICANT CHANGE UP (ref 20–27)
HCT VFR BLD CALC: 39.6 % — SIGNIFICANT CHANGE UP (ref 39–50)
HCT VFR BLD CALC: 40.9 % — SIGNIFICANT CHANGE UP (ref 39–50)
HCT VFR BLD CALC: 41.1 % — SIGNIFICANT CHANGE UP (ref 39–50)
HGB BLD-MCNC: 13 G/DL — SIGNIFICANT CHANGE UP (ref 13–17)
HGB BLD-MCNC: 13.3 G/DL — SIGNIFICANT CHANGE UP (ref 13–17)
HGB BLD-MCNC: 13.6 G/DL — SIGNIFICANT CHANGE UP (ref 13–17)
INR BLD: 1.11 — SIGNIFICANT CHANGE UP (ref 0.88–1.16)
INR BLD: 1.25 — HIGH (ref 0.88–1.16)
INR BLD: 1.27 — HIGH (ref 0.88–1.16)
LACTATE SERPL-SCNC: 1 MMOL/L — SIGNIFICANT CHANGE UP (ref 0.5–2)
LACTATE SERPL-SCNC: 1.3 MMOL/L — SIGNIFICANT CHANGE UP (ref 0.5–2)
LACTATE SERPL-SCNC: 1.7 MMOL/L — SIGNIFICANT CHANGE UP (ref 0.5–2)
LEGIONELLA AG UR QL: NEGATIVE — SIGNIFICANT CHANGE UP
MAGNESIUM SERPL-MCNC: 1.9 MG/DL — SIGNIFICANT CHANGE UP (ref 1.6–2.6)
MAGNESIUM SERPL-MCNC: 2.1 MG/DL — SIGNIFICANT CHANGE UP (ref 1.6–2.6)
MAGNESIUM UR-MCNC: 3.5 MG/DL — SIGNIFICANT CHANGE UP
MCHC RBC-ENTMCNC: 28.1 PG — SIGNIFICANT CHANGE UP (ref 27–34)
MCHC RBC-ENTMCNC: 28.4 PG — SIGNIFICANT CHANGE UP (ref 27–34)
MCHC RBC-ENTMCNC: 28.9 PG — SIGNIFICANT CHANGE UP (ref 27–34)
MCHC RBC-ENTMCNC: 32.5 G/DL — SIGNIFICANT CHANGE UP (ref 32–36)
MCHC RBC-ENTMCNC: 32.8 G/DL — SIGNIFICANT CHANGE UP (ref 32–36)
MCHC RBC-ENTMCNC: 33.1 G/DL — SIGNIFICANT CHANGE UP (ref 32–36)
MCV RBC AUTO: 84.9 FL — SIGNIFICANT CHANGE UP (ref 80–100)
MCV RBC AUTO: 86.5 FL — SIGNIFICANT CHANGE UP (ref 80–100)
MCV RBC AUTO: 88.9 FL — SIGNIFICANT CHANGE UP (ref 80–100)
OSMOLALITY SERPL: 286 MOSM/KG — SIGNIFICANT CHANGE UP (ref 280–301)
OSMOLALITY UR: 532 MOSMOL/KG — SIGNIFICANT CHANGE UP (ref 100–650)
PCO2 BLDV: 47 MMHG — SIGNIFICANT CHANGE UP (ref 41–51)
PH BLDV: 7.36 — SIGNIFICANT CHANGE UP (ref 7.32–7.43)
PHOSPHATE SERPL-MCNC: 2.7 MG/DL — SIGNIFICANT CHANGE UP (ref 2.5–4.5)
PHOSPHATE SERPL-MCNC: 3.9 MG/DL — SIGNIFICANT CHANGE UP (ref 2.5–4.5)
PLATELET # BLD AUTO: 277 K/UL — SIGNIFICANT CHANGE UP (ref 150–400)
PLATELET # BLD AUTO: 278 K/UL — SIGNIFICANT CHANGE UP (ref 150–400)
PLATELET # BLD AUTO: 283 K/UL — SIGNIFICANT CHANGE UP (ref 150–400)
PO2 BLDV: 91 MMHG — SIGNIFICANT CHANGE UP
POTASSIUM SERPL-MCNC: 3.9 MMOL/L — SIGNIFICANT CHANGE UP (ref 3.5–5.3)
POTASSIUM SERPL-MCNC: 4.8 MMOL/L — SIGNIFICANT CHANGE UP (ref 3.5–5.3)
POTASSIUM SERPL-MCNC: 4.9 MMOL/L — SIGNIFICANT CHANGE UP (ref 3.5–5.3)
POTASSIUM SERPL-SCNC: 3.9 MMOL/L — SIGNIFICANT CHANGE UP (ref 3.5–5.3)
POTASSIUM SERPL-SCNC: 4.8 MMOL/L — SIGNIFICANT CHANGE UP (ref 3.5–5.3)
POTASSIUM SERPL-SCNC: 4.9 MMOL/L — SIGNIFICANT CHANGE UP (ref 3.5–5.3)
POTASSIUM UR-SCNC: 32 MMOL/L — SIGNIFICANT CHANGE UP
PROT SERPL-MCNC: 6.8 G/DL — SIGNIFICANT CHANGE UP (ref 6–8.3)
PROT SERPL-MCNC: 7.6 G/DL — SIGNIFICANT CHANGE UP (ref 6–8.3)
PROT SERPL-MCNC: 8 G/DL — SIGNIFICANT CHANGE UP (ref 6–8.3)
PROTHROM AB SERPL-ACNC: 12.3 SEC — SIGNIFICANT CHANGE UP (ref 9.8–12.7)
PROTHROM AB SERPL-ACNC: 13.9 SEC — HIGH (ref 9.8–12.7)
PROTHROM AB SERPL-ACNC: 14.2 SEC — HIGH (ref 9.8–12.7)
RBC # BLD: 4.58 M/UL — SIGNIFICANT CHANGE UP (ref 4.2–5.8)
RBC # BLD: 4.6 M/UL — SIGNIFICANT CHANGE UP (ref 4.2–5.8)
RBC # BLD: 4.84 M/UL — SIGNIFICANT CHANGE UP (ref 4.2–5.8)
RBC # FLD: 12.3 % — SIGNIFICANT CHANGE UP (ref 10.3–16.9)
RBC # FLD: 12.6 % — SIGNIFICANT CHANGE UP (ref 10.3–16.9)
RBC # FLD: 12.9 % — SIGNIFICANT CHANGE UP (ref 10.3–16.9)
SAO2 % BLDV: 96 % — SIGNIFICANT CHANGE UP
SODIUM SERPL-SCNC: 125 MMOL/L — LOW (ref 135–145)
SODIUM SERPL-SCNC: 129 MMOL/L — LOW (ref 135–145)
SODIUM SERPL-SCNC: 129 MMOL/L — LOW (ref 135–145)
SODIUM UR-SCNC: <20 MMOL/L — SIGNIFICANT CHANGE UP
WBC # BLD: 13.9 K/UL — HIGH (ref 3.8–10.5)
WBC # BLD: 14.4 K/UL — HIGH (ref 3.8–10.5)
WBC # BLD: 14.4 K/UL — HIGH (ref 3.8–10.5)
WBC # FLD AUTO: 13.9 K/UL — HIGH (ref 3.8–10.5)
WBC # FLD AUTO: 14.4 K/UL — HIGH (ref 3.8–10.5)
WBC # FLD AUTO: 14.4 K/UL — HIGH (ref 3.8–10.5)

## 2018-10-02 PROCEDURE — 93010 ELECTROCARDIOGRAM REPORT: CPT

## 2018-10-02 PROCEDURE — 71045 X-RAY EXAM CHEST 1 VIEW: CPT | Mod: 26

## 2018-10-02 PROCEDURE — 36620 INSERTION CATHETER ARTERY: CPT

## 2018-10-02 PROCEDURE — 71045 X-RAY EXAM CHEST 1 VIEW: CPT | Mod: 26,77

## 2018-10-02 PROCEDURE — 99292 CRITICAL CARE ADDL 30 MIN: CPT | Mod: 25

## 2018-10-02 PROCEDURE — 99291 CRITICAL CARE FIRST HOUR: CPT | Mod: 25

## 2018-10-02 PROCEDURE — 94010 BREATHING CAPACITY TEST: CPT | Mod: 26

## 2018-10-02 RX ORDER — DEXTROSE 50 % IN WATER 50 %
25 SYRINGE (ML) INTRAVENOUS ONCE
Qty: 0 | Refills: 0 | Status: DISCONTINUED | OUTPATIENT
Start: 2018-10-02 | End: 2018-10-04

## 2018-10-02 RX ORDER — METOCLOPRAMIDE HCL 10 MG
10 TABLET ORAL ONCE
Qty: 0 | Refills: 0 | Status: COMPLETED | OUTPATIENT
Start: 2018-10-02 | End: 2018-10-02

## 2018-10-02 RX ORDER — POTASSIUM CHLORIDE 20 MEQ
20 PACKET (EA) ORAL ONCE
Qty: 0 | Refills: 0 | Status: COMPLETED | OUTPATIENT
Start: 2018-10-02 | End: 2018-10-02

## 2018-10-02 RX ORDER — POLYETHYLENE GLYCOL 3350 17 G/17G
17 POWDER, FOR SOLUTION ORAL ONCE
Qty: 0 | Refills: 0 | Status: DISCONTINUED | OUTPATIENT
Start: 2018-10-02 | End: 2018-10-04

## 2018-10-02 RX ORDER — MAGNESIUM SULFATE 500 MG/ML
2 VIAL (ML) INJECTION ONCE
Qty: 0 | Refills: 0 | Status: COMPLETED | OUTPATIENT
Start: 2018-10-02 | End: 2018-10-02

## 2018-10-02 RX ORDER — PIPERACILLIN AND TAZOBACTAM 4; .5 G/20ML; G/20ML
3.38 INJECTION, POWDER, LYOPHILIZED, FOR SOLUTION INTRAVENOUS EVERY 6 HOURS
Qty: 0 | Refills: 0 | Status: DISCONTINUED | OUTPATIENT
Start: 2018-10-02 | End: 2018-10-04

## 2018-10-02 RX ORDER — INSULIN GLARGINE 100 [IU]/ML
20 INJECTION, SOLUTION SUBCUTANEOUS AT BEDTIME
Qty: 0 | Refills: 0 | Status: DISCONTINUED | OUTPATIENT
Start: 2018-10-02 | End: 2018-10-02

## 2018-10-02 RX ORDER — LEVALBUTEROL 1.25 MG/.5ML
0.63 SOLUTION, CONCENTRATE RESPIRATORY (INHALATION) EVERY 6 HOURS
Qty: 0 | Refills: 0 | Status: DISCONTINUED | OUTPATIENT
Start: 2018-10-02 | End: 2018-10-03

## 2018-10-02 RX ORDER — SODIUM CHLORIDE 9 MG/ML
1000 INJECTION, SOLUTION INTRAVENOUS
Qty: 0 | Refills: 0 | Status: DISCONTINUED | OUTPATIENT
Start: 2018-10-02 | End: 2018-10-03

## 2018-10-02 RX ORDER — CALCIUM GLUCONATE 100 MG/ML
2 VIAL (ML) INTRAVENOUS ONCE
Qty: 0 | Refills: 0 | Status: COMPLETED | OUTPATIENT
Start: 2018-10-02 | End: 2018-10-02

## 2018-10-02 RX ORDER — DEXTROSE 50 % IN WATER 50 %
12.5 SYRINGE (ML) INTRAVENOUS ONCE
Qty: 0 | Refills: 0 | Status: DISCONTINUED | OUTPATIENT
Start: 2018-10-02 | End: 2018-10-04

## 2018-10-02 RX ORDER — DEXTROSE 50 % IN WATER 50 %
15 SYRINGE (ML) INTRAVENOUS ONCE
Qty: 0 | Refills: 0 | Status: DISCONTINUED | OUTPATIENT
Start: 2018-10-02 | End: 2018-10-04

## 2018-10-02 RX ORDER — HUMAN INSULIN 100 [IU]/ML
10 INJECTION, SUSPENSION SUBCUTANEOUS ONCE
Qty: 0 | Refills: 0 | Status: COMPLETED | OUTPATIENT
Start: 2018-10-02 | End: 2018-10-02

## 2018-10-02 RX ORDER — ALBUMIN HUMAN 25 %
50 VIAL (ML) INTRAVENOUS
Qty: 0 | Refills: 0 | Status: COMPLETED | OUTPATIENT
Start: 2018-10-02 | End: 2018-10-02

## 2018-10-02 RX ORDER — OXYCODONE AND ACETAMINOPHEN 5; 325 MG/1; MG/1
2 TABLET ORAL EVERY 6 HOURS
Qty: 0 | Refills: 0 | Status: DISCONTINUED | OUTPATIENT
Start: 2018-10-02 | End: 2018-10-04

## 2018-10-02 RX ORDER — INSULIN LISPRO 100/ML
VIAL (ML) SUBCUTANEOUS
Qty: 0 | Refills: 0 | Status: DISCONTINUED | OUTPATIENT
Start: 2018-10-02 | End: 2018-10-04

## 2018-10-02 RX ORDER — HYDROMORPHONE HYDROCHLORIDE 2 MG/ML
0.5 INJECTION INTRAMUSCULAR; INTRAVENOUS; SUBCUTANEOUS ONCE
Qty: 0 | Refills: 0 | Status: DISCONTINUED | OUTPATIENT
Start: 2018-10-02 | End: 2018-10-02

## 2018-10-02 RX ORDER — INSULIN LISPRO 100/ML
6 VIAL (ML) SUBCUTANEOUS
Qty: 0 | Refills: 0 | Status: DISCONTINUED | OUTPATIENT
Start: 2018-10-02 | End: 2018-10-03

## 2018-10-02 RX ORDER — AZITHROMYCIN 500 MG/1
TABLET, FILM COATED ORAL
Qty: 0 | Refills: 0 | Status: DISCONTINUED | OUTPATIENT
Start: 2018-10-02 | End: 2018-10-03

## 2018-10-02 RX ORDER — AZITHROMYCIN 500 MG/1
500 TABLET, FILM COATED ORAL EVERY 24 HOURS
Qty: 0 | Refills: 0 | Status: DISCONTINUED | OUTPATIENT
Start: 2018-10-03 | End: 2018-10-03

## 2018-10-02 RX ORDER — GLUCAGON INJECTION, SOLUTION 0.5 MG/.1ML
1 INJECTION, SOLUTION SUBCUTANEOUS ONCE
Qty: 0 | Refills: 0 | Status: DISCONTINUED | OUTPATIENT
Start: 2018-10-02 | End: 2018-10-04

## 2018-10-02 RX ORDER — ALBUMIN HUMAN 25 %
250 VIAL (ML) INTRAVENOUS ONCE
Qty: 0 | Refills: 0 | Status: COMPLETED | OUTPATIENT
Start: 2018-10-02 | End: 2018-10-02

## 2018-10-02 RX ORDER — OXYCODONE AND ACETAMINOPHEN 5; 325 MG/1; MG/1
1 TABLET ORAL EVERY 4 HOURS
Qty: 0 | Refills: 0 | Status: DISCONTINUED | OUTPATIENT
Start: 2018-10-02 | End: 2018-10-04

## 2018-10-02 RX ORDER — VANCOMYCIN HCL 1 G
1000 VIAL (EA) INTRAVENOUS EVERY 12 HOURS
Qty: 0 | Refills: 0 | Status: DISCONTINUED | OUTPATIENT
Start: 2018-10-02 | End: 2018-10-04

## 2018-10-02 RX ORDER — INSULIN GLARGINE 100 [IU]/ML
25 INJECTION, SOLUTION SUBCUTANEOUS AT BEDTIME
Qty: 0 | Refills: 0 | Status: DISCONTINUED | OUTPATIENT
Start: 2018-10-02 | End: 2018-10-03

## 2018-10-02 RX ORDER — AZITHROMYCIN 500 MG/1
500 TABLET, FILM COATED ORAL ONCE
Qty: 0 | Refills: 0 | Status: COMPLETED | OUTPATIENT
Start: 2018-10-02 | End: 2018-10-02

## 2018-10-02 RX ORDER — PANTOPRAZOLE SODIUM 20 MG/1
40 TABLET, DELAYED RELEASE ORAL
Qty: 0 | Refills: 0 | Status: DISCONTINUED | OUTPATIENT
Start: 2018-10-02 | End: 2018-10-04

## 2018-10-02 RX ORDER — FENTANYL CITRATE 50 UG/ML
12.5 INJECTION INTRAVENOUS ONCE
Qty: 0 | Refills: 0 | Status: DISCONTINUED | OUTPATIENT
Start: 2018-10-02 | End: 2018-10-02

## 2018-10-02 RX ORDER — METOPROLOL TARTRATE 50 MG
12.5 TABLET ORAL EVERY 6 HOURS
Qty: 0 | Refills: 0 | Status: DISCONTINUED | OUTPATIENT
Start: 2018-10-02 | End: 2018-10-03

## 2018-10-02 RX ORDER — DOCUSATE SODIUM 100 MG
100 CAPSULE ORAL THREE TIMES A DAY
Qty: 0 | Refills: 0 | Status: DISCONTINUED | OUTPATIENT
Start: 2018-10-02 | End: 2018-10-04

## 2018-10-02 RX ORDER — SENNA PLUS 8.6 MG/1
2 TABLET ORAL AT BEDTIME
Qty: 0 | Refills: 0 | Status: DISCONTINUED | OUTPATIENT
Start: 2018-10-02 | End: 2018-10-04

## 2018-10-02 RX ORDER — AMIODARONE HYDROCHLORIDE 400 MG/1
150 TABLET ORAL ONCE
Qty: 0 | Refills: 0 | Status: COMPLETED | OUTPATIENT
Start: 2018-10-02 | End: 2018-10-02

## 2018-10-02 RX ORDER — ACETAMINOPHEN 500 MG
650 TABLET ORAL EVERY 6 HOURS
Qty: 0 | Refills: 0 | Status: DISCONTINUED | OUTPATIENT
Start: 2018-10-02 | End: 2018-10-04

## 2018-10-02 RX ORDER — INSULIN LISPRO 100/ML
5 VIAL (ML) SUBCUTANEOUS
Qty: 0 | Refills: 0 | Status: DISCONTINUED | OUTPATIENT
Start: 2018-10-02 | End: 2018-10-02

## 2018-10-02 RX ADMIN — CHLORHEXIDINE GLUCONATE 1 APPLICATION(S): 213 SOLUTION TOPICAL at 22:31

## 2018-10-02 RX ADMIN — Medication 250 MILLIGRAM(S): at 18:40

## 2018-10-02 RX ADMIN — BUDESONIDE AND FORMOTEROL FUMARATE DIHYDRATE 2 PUFF(S): 160; 4.5 AEROSOL RESPIRATORY (INHALATION) at 07:11

## 2018-10-02 RX ADMIN — FENTANYL CITRATE 25 MICROGRAM(S): 50 INJECTION INTRAVENOUS at 01:00

## 2018-10-02 RX ADMIN — Medication 100 MILLIGRAM(S): at 22:31

## 2018-10-02 RX ADMIN — HYDROMORPHONE HYDROCHLORIDE 0.5 MILLIGRAM(S): 2 INJECTION INTRAMUSCULAR; INTRAVENOUS; SUBCUTANEOUS at 19:36

## 2018-10-02 RX ADMIN — BUDESONIDE AND FORMOTEROL FUMARATE DIHYDRATE 2 PUFF(S): 160; 4.5 AEROSOL RESPIRATORY (INHALATION) at 18:03

## 2018-10-02 RX ADMIN — Medication 50 MILLILITER(S): at 10:40

## 2018-10-02 RX ADMIN — ATORVASTATIN CALCIUM 80 MILLIGRAM(S): 80 TABLET, FILM COATED ORAL at 22:18

## 2018-10-02 RX ADMIN — Medication 40 MILLIGRAM(S): at 23:31

## 2018-10-02 RX ADMIN — CHLORHEXIDINE GLUCONATE 5 MILLILITER(S): 213 SOLUTION TOPICAL at 02:27

## 2018-10-02 RX ADMIN — PANTOPRAZOLE SODIUM 40 MILLIGRAM(S): 20 TABLET, DELAYED RELEASE ORAL at 07:27

## 2018-10-02 RX ADMIN — FENTANYL CITRATE 12.5 MICROGRAM(S): 50 INJECTION INTRAVENOUS at 08:10

## 2018-10-02 RX ADMIN — HEPARIN SODIUM 5000 UNIT(S): 5000 INJECTION INTRAVENOUS; SUBCUTANEOUS at 22:23

## 2018-10-02 RX ADMIN — Medication 6 UNIT(S): at 17:59

## 2018-10-02 RX ADMIN — FENTANYL CITRATE 25 MICROGRAM(S): 50 INJECTION INTRAVENOUS at 01:17

## 2018-10-02 RX ADMIN — PIPERACILLIN AND TAZOBACTAM 200 GRAM(S): 4; .5 INJECTION, POWDER, LYOPHILIZED, FOR SOLUTION INTRAVENOUS at 16:22

## 2018-10-02 RX ADMIN — CLOPIDOGREL BISULFATE 75 MILLIGRAM(S): 75 TABLET, FILM COATED ORAL at 12:19

## 2018-10-02 RX ADMIN — AMIODARONE HYDROCHLORIDE 133.33 MILLIGRAM(S): 400 TABLET ORAL at 09:20

## 2018-10-02 RX ADMIN — Medication 8: at 18:02

## 2018-10-02 RX ADMIN — Medication 5 UNIT(S): at 12:18

## 2018-10-02 RX ADMIN — OXYCODONE AND ACETAMINOPHEN 2 TABLET(S): 5; 325 TABLET ORAL at 14:00

## 2018-10-02 RX ADMIN — Medication 100 MILLIEQUIVALENT(S): at 04:00

## 2018-10-02 RX ADMIN — HEPARIN SODIUM 5000 UNIT(S): 5000 INJECTION INTRAVENOUS; SUBCUTANEOUS at 07:10

## 2018-10-02 RX ADMIN — PIPERACILLIN AND TAZOBACTAM 200 GRAM(S): 4; .5 INJECTION, POWDER, LYOPHILIZED, FOR SOLUTION INTRAVENOUS at 23:30

## 2018-10-02 RX ADMIN — HUMAN INSULIN 10 UNIT(S): 100 INJECTION, SUSPENSION SUBCUTANEOUS at 07:20

## 2018-10-02 RX ADMIN — OXYCODONE AND ACETAMINOPHEN 2 TABLET(S): 5; 325 TABLET ORAL at 12:59

## 2018-10-02 RX ADMIN — Medication 12.5 MILLIGRAM(S): at 12:14

## 2018-10-02 RX ADMIN — SENNA PLUS 2 TABLET(S): 8.6 TABLET ORAL at 22:18

## 2018-10-02 RX ADMIN — Medication 400 MILLIGRAM(S): at 02:42

## 2018-10-02 RX ADMIN — Medication 50 GRAM(S): at 16:00

## 2018-10-02 RX ADMIN — Medication 50 MILLILITER(S): at 12:19

## 2018-10-02 RX ADMIN — Medication 125 MILLILITER(S): at 07:56

## 2018-10-02 RX ADMIN — Medication 2000 MILLIGRAM(S): at 14:13

## 2018-10-02 RX ADMIN — INSULIN GLARGINE 25 UNIT(S): 100 INJECTION, SOLUTION SUBCUTANEOUS at 23:00

## 2018-10-02 RX ADMIN — Medication 2000 MILLIGRAM(S): at 07:10

## 2018-10-02 RX ADMIN — FENTANYL CITRATE 12.5 MICROGRAM(S): 50 INJECTION INTRAVENOUS at 07:56

## 2018-10-02 RX ADMIN — AZITHROMYCIN 255 MILLIGRAM(S): 500 TABLET, FILM COATED ORAL at 18:01

## 2018-10-02 RX ADMIN — Medication 6: at 23:26

## 2018-10-02 RX ADMIN — Medication 1000 MILLIGRAM(S): at 03:42

## 2018-10-02 RX ADMIN — Medication 10 MILLIGRAM(S): at 12:17

## 2018-10-02 RX ADMIN — Medication 8: at 12:18

## 2018-10-02 RX ADMIN — Medication 81 MILLIGRAM(S): at 12:19

## 2018-10-02 RX ADMIN — Medication 10 MILLIGRAM(S): at 05:08

## 2018-10-02 RX ADMIN — Medication 12.5 MILLIGRAM(S): at 23:32

## 2018-10-02 RX ADMIN — FENTANYL CITRATE 25 MICROGRAM(S): 50 INJECTION INTRAVENOUS at 05:30

## 2018-10-02 RX ADMIN — Medication 5 UNIT(S): at 07:25

## 2018-10-02 RX ADMIN — FENTANYL CITRATE 25 MICROGRAM(S): 50 INJECTION INTRAVENOUS at 05:15

## 2018-10-02 RX ADMIN — HEPARIN SODIUM 5000 UNIT(S): 5000 INJECTION INTRAVENOUS; SUBCUTANEOUS at 14:14

## 2018-10-02 RX ADMIN — Medication 200 GRAM(S): at 04:31

## 2018-10-02 RX ADMIN — Medication 12.5 MILLIGRAM(S): at 18:39

## 2018-10-02 RX ADMIN — Medication 100 MILLIGRAM(S): at 14:14

## 2018-10-02 RX ADMIN — HYDROMORPHONE HYDROCHLORIDE 0.5 MILLIGRAM(S): 2 INJECTION INTRAMUSCULAR; INTRAVENOUS; SUBCUTANEOUS at 19:49

## 2018-10-02 RX ADMIN — Medication 60 MILLIGRAM(S): at 22:00

## 2018-10-02 NOTE — PROGRESS NOTE ADULT - SUBJECTIVE AND OBJECTIVE BOX
Pt is s/p 3 Vessel disease   S/P Mid CAB XT yesterdayy  doing well post op   LIMA to LAD    PAST MEDICAL & SURGICAL HISTORY:  Diabetes  HLD (hyperlipidemia)  HTN (hypertension)  CAD (coronary artery disease)  COPD (chronic obstructive pulmonary disease)  H/O chest tube placement    MEDICATIONS  (STANDING):  albumin human 25% IVPB 50 milliLiter(s) IV Intermittent every 1 hour  aspirin enteric coated 81 milliGRAM(s) Oral daily  atorvastatin 80 milliGRAM(s) Oral at bedtime  buDESOnide 160 MICROgram(s)/formoterol 4.5 MICROgram(s) Inhaler 2 Puff(s) Inhalation two times a day  ceFAZolin  Injectable. 2000 milliGRAM(s) IV Push every 8 hours  chlorhexidine 2% Cloths 1 Application(s) Topical daily  clopidogrel Tablet 75 milliGRAM(s) Oral daily  dextrose 5%. 1000 milliLiter(s) (50 mL/Hr) IV Continuous <Continuous>  dextrose 50% Injectable 50 milliLiter(s) IV Push every 15 minutes  dextrose 50% Injectable 25 milliLiter(s) IV Push every 15 minutes  dextrose 50% Injectable 12.5 Gram(s) IV Push once  dextrose 50% Injectable 25 Gram(s) IV Push once  dextrose 50% Injectable 25 Gram(s) IV Push once  docusate sodium 100 milliGRAM(s) Oral three times a day  heparin  Injectable 5000 Unit(s) SubCutaneous every 8 hours  influenza   Vaccine 0.5 milliLiter(s) IntraMuscular once  insulin glargine Injectable (LANTUS) 20 Unit(s) SubCutaneous at bedtime  insulin Infusion 1 Unit(s)/Hr (1 mL/Hr) IV Continuous <Continuous>  insulin lispro (HumaLOG) corrective regimen sliding scale   SubCutaneous Before meals and at bedtime  insulin lispro Injectable (HumaLOG) 5 Unit(s) SubCutaneous three times a day before meals  metoclopramide Injectable 10 milliGRAM(s) IV Push once  metoprolol tartrate 12.5 milliGRAM(s) Oral every 6 hours  pantoprazole    Tablet 40 milliGRAM(s) Oral before breakfast  polyethylene glycol 3350 17 Gram(s) Oral once  senna 2 Tablet(s) Oral at bedtime  sodium chloride 0.9%. 1000 milliLiter(s) (10 mL/Hr) IV Continuous <Continuous>    MEDICATIONS  (PRN):  acetaminophen   Tablet .. 650 milliGRAM(s) Oral every 6 hours PRN Mild Pain (1 - 3)  dextrose 40% Gel 15 Gram(s) Oral once PRN Blood Glucose LESS THAN 70 milliGRAM(s)/deciliter  glucagon  Injectable 1 milliGRAM(s) IntraMuscular once PRN Glucose LESS THAN 70 milligrams/deciliter  levalbuterol Inhalation 0.63 milliGRAM(s) Inhalation every 6 hours PRN wheezing/SOB  oxyCODONE    5 mG/acetaminophen 325 mG 1 Tablet(s) Oral every 4 hours PRN Moderate Pain (4 - 6)  oxyCODONE    5 mG/acetaminophen 325 mG 2 Tablet(s) Oral every 6 hours PRN Severe Pain (7 - 10)    ICU Vital Signs Last 24 Hrs  T(C): 37 (02 Oct 2018 09:00), Max: 37 (02 Oct 2018 09:00)  T(F): 98.6 (02 Oct 2018 09:00), Max: 98.6 (02 Oct 2018 09:00)  HR: 106 (02 Oct 2018 11:00) (90 - 109)  BP: 138/82 (02 Oct 2018 11:00) (125/54 - 149/80)  BP(mean): 112 (02 Oct 2018 11:00) (78 - 112)  ABP: 130/72 (02 Oct 2018 11:00) (92/68 - 188/100)  ABP(mean): 110 (02 Oct 2018 11:00) (78 - 138)  RR: 29 (02 Oct 2018 11:00) (12 - 34)  SpO2: 98% (02 Oct 2018 11:00) (91% - 100%)      Lungs decreased breath sounds at bases  Left apical pneumothorax    CV s1 s2   abd soft  Ext stable                          13.6   14.4  )-----------( 283      ( 02 Oct 2018 02:20 )             41.1   10-02    129<L>  |  91<L>  |  13  ----------------------------<  237<H>  3.9   |  25  |  0.65    Ca    9.0      02 Oct 2018 02:21  Phos  3.9     10-02  Mg     2.1     10-02    TPro  6.8  /  Alb  3.5  /  TBili  0.4  /  DBili  x   /  AST  25  /  ALT  23  /  AlkPhos  58  10-02  PT/INR - ( 02 Oct 2018 02:21 )   PT: 12.3 sec;   INR: 1.11          PTT - ( 02 Oct 2018 02:21 )  PTT:23.1 sec

## 2018-10-02 NOTE — PROCEDURE NOTE - NSPROCDETAILS_GEN_ALL_CORE
positive blood return obtained via catheter/Seldinger technique/location identified, draped/prepped, sterile technique used, needle inserted/introduced/connected to a pressurized flush line/sutured in place

## 2018-10-02 NOTE — PHYSICAL THERAPY INITIAL EVALUATION ADULT - GENERAL OBSERVATIONS, REHAB EVAL
Patient received seated in bedside chair with + 6L NC 02, tele, vitale catheter. Patient in no apparent distress.

## 2018-10-02 NOTE — CONSULT NOTE ADULT - SUBJECTIVE AND OBJECTIVE BOX
HPI: 52 y/o M with strong FamHx of CAD/MI (4 grandparents  from [place], father  from [place], sister s/p CABG) and PMHx of COPD, Bronchiectasis, h/o MAC, HTN, DM, HTN who was initially referred to  for cardiac cath due to abnormal stress test on 18, which as per transfer notes from , showed small amount of ischemia in the apical region. He states his PCP, referred him for stress test (unsure why though per pt) which was abnormal prompting him to proceed with elective cath at . Pt is now s/p diagnostic cardiac cath at  on 18 revealing significant 3VCAD: ostial LAD: eccentric tubular 75% stenosis, prox LAD: eccentric tubular 40% stenosis, prox Ramus: eccentric tubular 70% stenosis, prox RCA: tubular 20% stenosis, mid RCA: eccentric and calcified tubular 95% stenosis for which attempted PCI was unsuccessful due to heavy calcification at the RCA ostium, severely elevated EDP, diaphragmatic and posterobasal hypokinesis, EF 45%. Pt was deemed medically stable for transfer to [facility] on 18 with plan for staged PCI on 18 with Dr. Corrales. Upon arrival to [facility], VS noted as 144/74, HR 92, RR 18, SpO2 99% on RA. EKG shows SR at 95BPM with RBBB (known), with no acute changes noted. On interview with Prasanth HOGAN, pt endorses CID with walking up 2 flights of stairs which he attributes to his COPD. Pt denies any HA, dizziness, syncope, CP, palpitations, SOB at rest, n/v/d, diaphoresis, dysuria, fevers, chills, melena, LE edema, or any other symptoms at this time. As per d/w Dr. Corrales, pt was already loaded with ASA/Plavix prior to procedure at  and will receive ASA 81mg and Plavix 75mg on the morning of 18 prior to staged PCI.  Proventil 2.5 mg/3 mL (0.083%) inhalation solution: 3 milliliter(s) inhaled every 6 hours, As Needed, Last Dose Taken:    · 	Lipitor 20 mg oral tablet: 1 tab(s) orally once a day, Last Dose Taken:    · 	enalapril 5 mg oral tablet: 1 tab(s) orally once a day, Last Dose Taken:    · 	Vitamin D2 50,000 intl units (1.25 mg) oral capsule: 1 cap(s) orally once a week, Last Dose Taken:    · 	Advair  mcg-21 mcg/inh inhalation aerosol: 2 puff(s) inhaled 2 times a day, Last Dose Taken:    · 	glyBURIDE 5 mg oral tablet: 1 tab(s) orally once a day, Last Dose Taken:    · 	Basaglar KwikPen 100 units/mL subcutaneous solution: 24 unit(s) subcutaneous once a day, Last Dose Taken:    · 	HumuLIN R 100 units/mL injectable solution: 3 unit(s) injectable 3 times a day (with meals), Last Dose Taken:    · 	Jublia 10% topical solution: Apply topically to affected area once a day, Last Dose Taken:    · 	metFORMIN 850 mg oral tablet: 1 tab(s) orally 3 times a day, Last Dose Taken:    · 	Metoprolol Tartrate 25 mg oral tablet: 1 tab(s) orally 2 times a day, Last Dose Taken:    · 	Januvia 50 mg oral tablet: 1 tab(s) orally once a day  · 	Vitamin B12 100 mcg oral tablet: 1 tab(s) orally once a day, Last Dose Taken:      .    Age at Dx:  How dx:  Hx and duration of insulin:  Current Therapy:  Hx of hypoglycemia  Hx of DKA/HHS?    Home FSG:  Fasting  Lunch  Dinner  Bed    Hx of other regimens  Complications:  Outpatient Endo:    PMH & Surgical Hx:PCI  CAD  Family history of CABG (Sibling)  Family history of acute myocardial infarction (Father, Grandparent)  Handoff  MEWS Score  Diabetes  HLD (hyperlipidemia)  HTN (hypertension)  CAD (coronary artery disease)  COPD (chronic obstructive pulmonary disease)  CAD (coronary artery disease)  CAD (coronary artery disease)  HFrEF (heart failure with reduced ejection fraction)  COPD (chronic obstructive pulmonary disease)  Diabetes  HTN (hypertension)  HLD (hyperlipidemia)  CAD (coronary artery disease)  MIDCAB, robot assisted  H/O chest tube placement      FH:  DM:  Thyroid:  Autoimmune:  Other:    SH:  Smoking  Etoh:  Recreational Drugs:  Social Life:    Current Meds:  acetaminophen   Tablet .. 650 milliGRAM(s) Oral every 6 hours PRN  aspirin enteric coated 81 milliGRAM(s) Oral daily  atorvastatin 80 milliGRAM(s) Oral at bedtime  buDESOnide 160 MICROgram(s)/formoterol 4.5 MICROgram(s) Inhaler 2 Puff(s) Inhalation two times a day  ceFAZolin  Injectable. 2000 milliGRAM(s) IV Push every 8 hours  chlorhexidine 2% Cloths 1 Application(s) Topical daily  clopidogrel Tablet 75 milliGRAM(s) Oral daily  dextrose 40% Gel 15 Gram(s) Oral once PRN  dextrose 5%. 1000 milliLiter(s) IV Continuous <Continuous>  dextrose 50% Injectable 50 milliLiter(s) IV Push every 15 minutes  dextrose 50% Injectable 25 milliLiter(s) IV Push every 15 minutes  dextrose 50% Injectable 12.5 Gram(s) IV Push once  dextrose 50% Injectable 25 Gram(s) IV Push once  dextrose 50% Injectable 25 Gram(s) IV Push once  docusate sodium 100 milliGRAM(s) Oral three times a day  glucagon  Injectable 1 milliGRAM(s) IntraMuscular once PRN  heparin  Injectable 5000 Unit(s) SubCutaneous every 8 hours  influenza   Vaccine 0.5 milliLiter(s) IntraMuscular once  insulin glargine Injectable (LANTUS) 20 Unit(s) SubCutaneous at bedtime  insulin Infusion 1 Unit(s)/Hr IV Continuous <Continuous>  insulin lispro (HumaLOG) corrective regimen sliding scale   SubCutaneous Before meals and at bedtime  insulin lispro Injectable (HumaLOG) 5 Unit(s) SubCutaneous three times a day before meals  levalbuterol Inhalation 0.63 milliGRAM(s) Inhalation every 6 hours PRN  metoprolol tartrate 12.5 milliGRAM(s) Oral every 6 hours  oxyCODONE    5 mG/acetaminophen 325 mG 1 Tablet(s) Oral every 4 hours PRN  oxyCODONE    5 mG/acetaminophen 325 mG 2 Tablet(s) Oral every 6 hours PRN  pantoprazole    Tablet 40 milliGRAM(s) Oral before breakfast  polyethylene glycol 3350 17 Gram(s) Oral once  senna 2 Tablet(s) Oral at bedtime  sodium chloride 0.9%. 1000 milliLiter(s) IV Continuous <Continuous>      Allergies:  No Known Allergies      ROS:  Denies the following except as indicated.    General: weight loss/weight gain, decreased appetite, fatigue  Eyes: Blurry vision, double vision, visual changes  ENT: Throat pain, changes in voice,   CV: palpitations, SOB, CP, cough  GI: NVD, difficulty swallowing, abdominal pain  : polyuria, dysuria  Endo: abnormal menses, temperature intolerance, decreased libido  MSK: weakness, joint pain  Skin: rash, dryness, diaphoresis  Heme: Easy bruising,bleeding  Neuro: HA, dizziness, lightheadedness, numbness tingling  Psych: Anxiety, Depression    Vital Signs Last 24 Hrs  T(C): 37.9 (02 Oct 2018 13:00), Max: 37.9 (02 Oct 2018 13:00)  T(F): 100.2 (02 Oct 2018 13:00), Max: 100.2 (02 Oct 2018 13:00)  HR: 110 (02 Oct 2018 13:00) (90 - 116)  BP: 129/98 (02 Oct 2018 13:00) (125/54 - 163/82)  BP(mean): 109 (02 Oct 2018 13:00) (78 - 112)  RR: 38 (02 Oct 2018 13:00) (12 - 38)  SpO2: 97% (02 Oct 2018 13:00) (91% - 100%)  Height (cm): 167.64 ( @ 20:35)  Weight (kg): 74.5 ( @ 20:35)  BMI (kg/m2): 26.5 ( @ 20:35)      Constitutional: wn/wd in NAD.   HEENT: NCAT, MMM, OP clear, EOMI, , no proptosis or lid retraction  Neck: no thyromegaly or palpable thyroid nodules   Respiratory: lungs CTAB.  Cardiovascular: regular rhythm, normal S1 and S2, no audible murmurs, no peripheral edema  GI: soft, NT/ND, no masses/HSM appreciated.  Neurology: no tremors, DTR 2+  Skin: no visible rashes/lesions  Psychiatric: AAO x 3, normal affect/mood.  Ext: radial pulses intact, DP pulses intact, extremities warm, no cyanosis, clubbing or edema.       LABS:                        13.6   14.4  )-----------( 283      ( 02 Oct 2018 02:20 )             41.1     10-02    129<L>  |  91<L>  |  13  ----------------------------<  237<H>  3.9   |  25  |  0.65    Ca    9.0      02 Oct 2018 02:21  Phos  3.9     10-  Mg     2.1     10-    TPro  6.8  /  Alb  3.5  /  TBili  0.4  /  DBili  x   /  AST  25  /  ALT  23  /  AlkPhos  58  10-02    PT/INR - ( 02 Oct 2018 02:21 )   PT: 12.3 sec;   INR: 1.11          PTT - ( 02 Oct 2018 02:21 )  PTT:23.1 sec    Hemoglobin A1C, Whole Blood: 8.8 ( @ 06:14)    Thyroid Stimulating Hormone, Serum: 1.914 ( @ 22:55)      RADIOLOGY & ADDITIONAL STUDIES:  CAPILLARY BLOOD GLUCOSE      POCT Blood Glucose.: 326 mg/dL (02 Oct 2018 11:39)  POCT Blood Glucose.: 213 mg/dL (02 Oct 2018 09:45)  POCT Blood Glucose.: 147 mg/dL (02 Oct 2018 08:06)  POCT Blood Glucose.: 134 mg/dL (02 Oct 2018 07:15)  POCT Blood Glucose.: 155 mg/dL (02 Oct 2018 06:11)  POCT Blood Glucose.: 137 mg/dL (02 Oct 2018 05:20)  POCT Blood Glucose.: 157 mg/dL (02 Oct 2018 04:14)  POCT Blood Glucose.: 189 mg/dL (02 Oct 2018 03:09)  POCT Blood Glucose.: 206 mg/dL (02 Oct 2018 02:12)  POCT Blood Glucose.: 181 mg/dL (01 Oct 2018 23:57)  POCT Blood Glucose.: 166 mg/dL (01 Oct 2018 23:12)  POCT Blood Glucose.: 213 mg/dL (01 Oct 2018 21:15)        A/P: 52 y/o M with strong FamHx of CAD/MI (4 grandparents  from [place], father  from [place], sister s/p CABG) and PMHx of COPD, Bronchiectasis, h/o MAC, HTN, DM, HTN, with abnormal stress test, who is s/p diagnostic cardiac cath at  on 18 revealing significant 3VCAD with unsuccessful PCI due to heavy calcification at the RCA ostium, now transferred to [facility] for staged PCI on 18 with Dr. Corrales.    1.  DM  Please continue lantus       units at night / morning.  Please continue lispro      units before each meal.  Please continue lispro moderate / low dose sliding scale four times daily with meals and at bedtime    Pt's fingerstick glucose goal is     Will continue to monitor     For discharge, pt can continue    Pt can follow up at discharge with [facility] by calling  to make an appointment.   Will discuss case with     and update primary team HPI: 54 y/o M with PMHx of COPD, Bronchiectasis, h/o MAC, HTN, DM, HTN, and strong FamHx of CAD/MI (4 grandparents  from [place], father  from [place], sister s/p CABG)  with abnormal stress test on  and s/p diagnostic cardiac cath at  on 18 revealing significant 3VCAD with attempted PCI was unsuccessful due to heavy calcification at the RCA ostium, severely elevated EDP, diaphragmatic and posterobasal hypokinesis, EF 45%. Pt now s/p Mid CAB XT LIMA to LAD POD #1 with plan to return 4 weeks post discharge for PCI, now with hyperglycemia.    Proventil 2.5 mg/3 mL (0.083%) inhalation solution: 3 milliliter(s) inhaled every 6 hours, As Needed, Last Dose Taken:    · 	Lipitor 20 mg oral tablet: 1 tab(s) orally once a day, Last Dose Taken:    · 	enalapril 5 mg oral tablet: 1 tab(s) orally once a day, Last Dose Taken:    · 	Vitamin D2 50,000 intl units (1.25 mg) oral capsule: 1 cap(s) orally once a week, Last Dose Taken:    · 	Advair  mcg-21 mcg/inh inhalation aerosol: 2 puff(s) inhaled 2 times a day, Last Dose Taken:    · 	glyBURIDE 5 mg oral tablet: 1 tab(s) orally once a day, Last Dose Taken:    · 	Basaglar KwikPen 100 units/mL subcutaneous solution: 24 unit(s) subcutaneous once a day, Last Dose Taken:    · 	HumuLIN R 100 units/mL injectable solution: 3 unit(s) injectable 3 times a day (with meals), Last Dose Taken:    · 	Jublia 10% topical solution: Apply topically to affected area once a day, Last Dose Taken:    · 	metFORMIN 850 mg oral tablet: 1 tab(s) orally 3 times a day, Last Dose Taken:    · 	Metoprolol Tartrate 25 mg oral tablet: 1 tab(s) orally 2 times a day, Last Dose Taken:    · 	Januvia 50 mg oral tablet: 1 tab(s) orally once a day  · 	Vitamin B12 100 mcg oral tablet: 1 tab(s) orally once a day, Last Dose Taken:      .    Age at Dx:  How dx:  Hx and duration of insulin:  Current Therapy:  Hx of hypoglycemia  Hx of DKA/HHS?    Home FSG:  Fasting  Lunch  Dinner  Bed    Hx of other regimens  Complications:  Outpatient Endo:    PMH & Surgical Hx:PCI  CAD  Family history of CABG (Sibling)  Family history of acute myocardial infarction (Father, Grandparent)  Handoff  MEWS Score  Diabetes  HLD (hyperlipidemia)  HTN (hypertension)  CAD (coronary artery disease)  COPD (chronic obstructive pulmonary disease)  CAD (coronary artery disease)  CAD (coronary artery disease)  HFrEF (heart failure with reduced ejection fraction)  COPD (chronic obstructive pulmonary disease)  Diabetes  HTN (hypertension)  HLD (hyperlipidemia)  CAD (coronary artery disease)  MIDCAB, robot assisted  H/O chest tube placement      FH:  DM:  Thyroid:  Autoimmune:  Other:    SH:  Smoking  Etoh:  Recreational Drugs:  Social Life:    Current Meds:  acetaminophen   Tablet .. 650 milliGRAM(s) Oral every 6 hours PRN  aspirin enteric coated 81 milliGRAM(s) Oral daily  atorvastatin 80 milliGRAM(s) Oral at bedtime  buDESOnide 160 MICROgram(s)/formoterol 4.5 MICROgram(s) Inhaler 2 Puff(s) Inhalation two times a day  ceFAZolin  Injectable. 2000 milliGRAM(s) IV Push every 8 hours  chlorhexidine 2% Cloths 1 Application(s) Topical daily  clopidogrel Tablet 75 milliGRAM(s) Oral daily  dextrose 40% Gel 15 Gram(s) Oral once PRN  dextrose 5%. 1000 milliLiter(s) IV Continuous <Continuous>  dextrose 50% Injectable 50 milliLiter(s) IV Push every 15 minutes  dextrose 50% Injectable 25 milliLiter(s) IV Push every 15 minutes  dextrose 50% Injectable 12.5 Gram(s) IV Push once  dextrose 50% Injectable 25 Gram(s) IV Push once  dextrose 50% Injectable 25 Gram(s) IV Push once  docusate sodium 100 milliGRAM(s) Oral three times a day  glucagon  Injectable 1 milliGRAM(s) IntraMuscular once PRN  heparin  Injectable 5000 Unit(s) SubCutaneous every 8 hours  influenza   Vaccine 0.5 milliLiter(s) IntraMuscular once  insulin glargine Injectable (LANTUS) 20 Unit(s) SubCutaneous at bedtime  insulin Infusion 1 Unit(s)/Hr IV Continuous <Continuous>  insulin lispro (HumaLOG) corrective regimen sliding scale   SubCutaneous Before meals and at bedtime  insulin lispro Injectable (HumaLOG) 5 Unit(s) SubCutaneous three times a day before meals  levalbuterol Inhalation 0.63 milliGRAM(s) Inhalation every 6 hours PRN  metoprolol tartrate 12.5 milliGRAM(s) Oral every 6 hours  oxyCODONE    5 mG/acetaminophen 325 mG 1 Tablet(s) Oral every 4 hours PRN  oxyCODONE    5 mG/acetaminophen 325 mG 2 Tablet(s) Oral every 6 hours PRN  pantoprazole    Tablet 40 milliGRAM(s) Oral before breakfast  polyethylene glycol 3350 17 Gram(s) Oral once  senna 2 Tablet(s) Oral at bedtime  sodium chloride 0.9%. 1000 milliLiter(s) IV Continuous <Continuous>      Allergies:  No Known Allergies      ROS:  Denies the following except as indicated.    General: weight loss/weight gain, decreased appetite, fatigue  Eyes: Blurry vision, double vision, visual changes  ENT: Throat pain, changes in voice,   CV: palpitations, SOB, CP, cough  GI: NVD, difficulty swallowing, abdominal pain  : polyuria, dysuria  Endo: abnormal menses, temperature intolerance, decreased libido  MSK: weakness, joint pain  Skin: rash, dryness, diaphoresis  Heme: Easy bruising,bleeding  Neuro: HA, dizziness, lightheadedness, numbness tingling  Psych: Anxiety, Depression    Vital Signs Last 24 Hrs  T(C): 37.9 (02 Oct 2018 13:00), Max: 37.9 (02 Oct 2018 13:00)  T(F): 100.2 (02 Oct 2018 13:00), Max: 100.2 (02 Oct 2018 13:00)  HR: 110 (02 Oct 2018 13:00) (90 - 116)  BP: 129/98 (02 Oct 2018 13:00) (125/54 - 163/82)  BP(mean): 109 (02 Oct 2018 13:) (78 - 112)  RR: 38 (02 Oct 2018 13:) (12 - 38)  SpO2: 97% (02 Oct 2018 13:) (91% - 100%)  Height (cm): 167.64 ( @ 20:35)  Weight (kg): 74.5 ( @ 20:35)  BMI (kg/m2): 26.5 ( @ 20:35)      Constitutional: wn/wd in NAD.   HEENT: NCAT, MMM, OP clear, EOMI, , no proptosis or lid retraction  Neck: no thyromegaly or palpable thyroid nodules   Respiratory: lungs CTAB.  Cardiovascular: regular rhythm, normal S1 and S2, no audible murmurs, no peripheral edema  GI: soft, NT/ND, no masses/HSM appreciated.  Neurology: no tremors, DTR 2+  Skin: no visible rashes/lesions  Psychiatric: AAO x 3, normal affect/mood.  Ext: radial pulses intact, DP pulses intact, extremities warm, no cyanosis, clubbing or edema.       LABS:                        13.6   14.4  )-----------( 283      ( 02 Oct 2018 02:20 )             41.1     10-02    129<L>  |  91<L>  |  13  ----------------------------<  237<H>  3.9   |  25  |  0.65    Ca    9.0      02 Oct 2018 02:21  Phos  3.9     10-02  Mg     2.1     10-02    TPro  6.8  /  Alb  3.5  /  TBili  0.4  /  DBili  x   /  AST  25  /  ALT  23  /  AlkPhos  58  10-02    PT/INR - ( 02 Oct 2018 02:21 )   PT: 12.3 sec;   INR: 1.11          PTT - ( 02 Oct 2018 02:21 )  PTT:23.1 sec    Hemoglobin A1C, Whole Blood: 8.8 ( @ 06:14)    Thyroid Stimulating Hormone, Serum: 1.914 ( @ 22:55)      RADIOLOGY & ADDITIONAL STUDIES:  CAPILLARY BLOOD GLUCOSE      POCT Blood Glucose.: 326 mg/dL (02 Oct 2018 11:39)  POCT Blood Glucose.: 213 mg/dL (02 Oct 2018 09:45)  POCT Blood Glucose.: 147 mg/dL (02 Oct 2018 08:06)  POCT Blood Glucose.: 134 mg/dL (02 Oct 2018 07:15)  POCT Blood Glucose.: 155 mg/dL (02 Oct 2018 06:11)  POCT Blood Glucose.: 137 mg/dL (02 Oct 2018 05:20)  POCT Blood Glucose.: 157 mg/dL (02 Oct 2018 04:14)  POCT Blood Glucose.: 189 mg/dL (02 Oct 2018 03:09)  POCT Blood Glucose.: 206 mg/dL (02 Oct 2018 02:12)  POCT Blood Glucose.: 181 mg/dL (01 Oct 2018 23:57)  POCT Blood Glucose.: 166 mg/dL (01 Oct 2018 23:12)  POCT Blood Glucose.: 213 mg/dL (01 Oct 2018 21:15)      WILL DISCUSS IN ROUNDS  A/P: 54 y/o M with strong FamHx of CAD/MI (4 grandparents  from [place], father  from [place], sister s/p CABG) and PMHx of COPD, Bronchiectasis, h/o MAC, HTN, DM, HTN, with abnormal stress test, who is s/p diagnostic cardiac cath at  on 18 revealing significant 3VCAD with unsuccessful PCI due to heavy calcification at the RCA ostium s/p midcab POD #1, now with hyperglycemia.    1.  DM  Please continue lantus       units at night / morning.  Please continue lispro      units before each meal.  Please continue lispro moderate / low dose sliding scale four times daily with meals and at bedtime    Pt's fingerstick glucose goal is 100-150.    Will continue to monitor     For discharge, pt can continue    Pt can follow up at discharge with [facility] by calling  to make an appointment.   Will discuss case with Dr. Phelan  and update primary team HPI: 52 y/o M with PMHx of COPD, Bronchiectasis, h/o MAC, HTN, DM, HTN, and strong FamHx of CAD/MI (4 grandparents  from [place], father  from [place], sister s/p CABG)  with abnormal stress test on  and s/p diagnostic cardiac cath at  on 18 revealing significant 3VCAD with attempted PCI was unsuccessful due to heavy calcification at the RCA ostium, severely elevated EDP, diaphragmatic and posterobasal hypokinesis, EF 45%. Pt now s/p Mid CAB XT LIMA to LAD POD #1 with plan to return 4 weeks post discharge for PCI, now with hyperglycemia. Pt examined in chair. Pt dyspneic with HF NC. Reports cough. Denies N/V. Appetite good. Pt on insulin drip overnight requiring approx 7 units/ hours. Drip discontinued at 8AM with . NPH 10 units given. Nutritional lispro 5 units started with meals with . Pt ate bread, butter, coffee, potatoes, and eggs. Lunch . Lispro 5+8 given. Pt ate chicken, couscous, soup, and yogurt.     Age at Dx: 37 years old  Hx and duration of insulin: 10 years  Current Therapy: glyBURIDE 5 mg oral tablet: 1 tab(s) orally once a day, Basaglar KwikPen 100 units/mL subcutaneous solution: 24 unit(s) subcutaneous once a day, HumuLIN R 100 units/mL injectable solution: 3 unit(s) injectable 5-10 times a day (with meals), metFORMIN 850 mg oral tablet: 1 tab(s) orally 3 times a day, Januvia 50 mg oral tablet: 1 tab(s) orally once a day, Lipitor 20 mg oral tablet, enalapril 5 mg oral tablet  Hx of hypoglycemia: denies  Hx of DKA/HHS: denies    Home FSG: check twice daily  Fastin-160, sometimes 200s  Dinner: 100s  Diet: wife cooks; eats one meal/day at dinner and has lighter meals for breakfast and lunch. Eats salad, fried fish, meat. Denies heavy carb intake.  Exercise: Bikes daily.    Complications: denies RN, denies DN  Outpatient Endo: managed by PCP    PM & Surgical Hx:PCI  CAD  Family history of CABG (Sibling)  Family history of acute myocardial infarction (Father, Grandparent)  Handoff  MEWS Score  Diabetes  HLD (hyperlipidemia)  HTN (hypertension)  CAD (coronary artery disease)  COPD (chronic obstructive pulmonary disease)  CAD (coronary artery disease)  CAD (coronary artery disease)  HFrEF (heart failure with reduced ejection fraction)  COPD (chronic obstructive pulmonary disease)  Diabetes  HTN (hypertension)  HLD (hyperlipidemia)  CAD (coronary artery disease)  MIDCAB, robot assisted  H/O chest tube placement      FH:  DM: strong  Thyroid: denies  Autoimmune: denies  Other: strong cardiac    SH:  Smoking: denies  Etoh: denies  Recreational Drugs: denies  Social Life: lives with wife    Current Meds:  acetaminophen   Tablet .. 650 milliGRAM(s) Oral every 6 hours PRN  aspirin enteric coated 81 milliGRAM(s) Oral daily  atorvastatin 80 milliGRAM(s) Oral at bedtime  buDESOnide 160 MICROgram(s)/formoterol 4.5 MICROgram(s) Inhaler 2 Puff(s) Inhalation two times a day  ceFAZolin  Injectable. 2000 milliGRAM(s) IV Push every 8 hours  chlorhexidine 2% Cloths 1 Application(s) Topical daily  clopidogrel Tablet 75 milliGRAM(s) Oral daily  dextrose 40% Gel 15 Gram(s) Oral once PRN  dextrose 5%. 1000 milliLiter(s) IV Continuous <Continuous>  dextrose 50% Injectable 50 milliLiter(s) IV Push every 15 minutes  dextrose 50% Injectable 25 milliLiter(s) IV Push every 15 minutes  dextrose 50% Injectable 12.5 Gram(s) IV Push once  dextrose 50% Injectable 25 Gram(s) IV Push once  dextrose 50% Injectable 25 Gram(s) IV Push once  docusate sodium 100 milliGRAM(s) Oral three times a day  glucagon  Injectable 1 milliGRAM(s) IntraMuscular once PRN  heparin  Injectable 5000 Unit(s) SubCutaneous every 8 hours  influenza   Vaccine 0.5 milliLiter(s) IntraMuscular once  insulin glargine Injectable (LANTUS) 20 Unit(s) SubCutaneous at bedtime  insulin Infusion 1 Unit(s)/Hr IV Continuous <Continuous>  insulin lispro (HumaLOG) corrective regimen sliding scale   SubCutaneous Before meals and at bedtime  insulin lispro Injectable (HumaLOG) 5 Unit(s) SubCutaneous three times a day before meals  levalbuterol Inhalation 0.63 milliGRAM(s) Inhalation every 6 hours PRN  metoprolol tartrate 12.5 milliGRAM(s) Oral every 6 hours  oxyCODONE    5 mG/acetaminophen 325 mG 1 Tablet(s) Oral every 4 hours PRN  oxyCODONE    5 mG/acetaminophen 325 mG 2 Tablet(s) Oral every 6 hours PRN  pantoprazole    Tablet 40 milliGRAM(s) Oral before breakfast  polyethylene glycol 3350 17 Gram(s) Oral once  senna 2 Tablet(s) Oral at bedtime  sodium chloride 0.9%. 1000 milliLiter(s) IV Continuous <Continuous>      Allergies:  No Known Allergies      ROS:  Denies the following except as indicated.    General: weight loss/weight gain, decreased appetite, fatigue  Eyes: Blurry vision, double vision, visual changes  ENT: Throat pain, changes in voice,   CV: palpitations, CP  GI: NVD, difficulty swallowing, abdominal pain  : polyuria, dysuria  Endo:  temperature intolerance, decreased libido  MSK: weakness, joint pain  Skin: rash, dryness, diaphoresis  Heme: Easy bruising, bleeding  Neuro: HA, dizziness, lightheadedness, numbness tingling  Psych: Anxiety, Depression    Vital Signs Last 24 Hrs  T(C): 37.9 (02 Oct 2018 13:00), Max: 37.9 (02 Oct 2018 13:00)  T(F): 100.2 (02 Oct 2018 13:00), Max: 100.2 (02 Oct 2018 13:00)  HR: 110 (02 Oct 2018 13:00) (90 - 116)  BP: 129/98 (02 Oct 2018 13:00) (125/54 - 163/82)  BP(mean): 109 (02 Oct 2018 13:00) (78 - 112)  RR: 38 (02 Oct 2018 13:) (12 - 38)  SpO2: 97% (02 Oct 2018 13:00) (91% - 100%)  Height (cm): 167.64 ( @ 20:35)  Weight (kg): 74.5 ( @ 20:35)  BMI (kg/m2): 26.5 ( @ 20:35)      Constitutional: wn/wd in NAD.   HEENT: NCAT, MMM, OP clear, EOMI, , no proptosis or lid retraction  Neck: no thyromegaly or palpable thyroid nodules   Respiratory: decreased BS at bases; crackles throughout  Cardiovascular: regular rhythm, normal S1 and S2, no audible murmurs, no peripheral edema  GI: soft, NT/ND, no masses/HSM appreciated.  Neurology: no tremors, DTR 2+  Skin: psoriatic lesions to lower and upper extremities  Psychiatric: AAO x 3, normal affect/mood.  Ext: radial pulses intact, DP pulses intact, extremities cool, no cyanosis, clubbing or edema.       LABS:                        13.6   14.4  )-----------( 283      ( 02 Oct 2018 02:20 )             41.1     10-02    129<L>  |  91<L>  |  13  ----------------------------<  237<H>  3.9   |  25  |  0.65    Ca    9.0      02 Oct 2018 02:21  Phos  3.9     10-02  Mg     2.1     10-02    TPro  6.8  /  Alb  3.5  /  TBili  0.4  /  DBili  x   /  AST  25  /  ALT  23  /  AlkPhos  58  10-02    PT/INR - ( 02 Oct 2018 02:21 )   PT: 12.3 sec;   INR: 1.11          PTT - ( 02 Oct 2018 02:21 )  PTT:23.1 sec    Hemoglobin A1C, Whole Blood: 8.8 ( @ 06:14)    Thyroid Stimulating Hormone, Serum: 1.914 ( @ 22:55)    CAPILLARY BLOOD GLUCOSE  POCT Blood Glucose.: 326 mg/dL (02 Oct 2018 11:39)  POCT Blood Glucose.: 213 mg/dL (02 Oct 2018 09:45)  POCT Blood Glucose.: 147 mg/dL (02 Oct 2018 08:06)  POCT Blood Glucose.: 134 mg/dL (02 Oct 2018 07:15)  POCT Blood Glucose.: 155 mg/dL (02 Oct 2018 06:11)  POCT Blood Glucose.: 137 mg/dL (02 Oct 2018 05:20)  POCT Blood Glucose.: 157 mg/dL (02 Oct 2018 04:14)  POCT Blood Glucose.: 189 mg/dL (02 Oct 2018 03:09)  POCT Blood Glucose.: 206 mg/dL (02 Oct 2018 02:12)  POCT Blood Glucose.: 181 mg/dL (01 Oct 2018 23:57)  POCT Blood Glucose.: 166 mg/dL (01 Oct 2018 23:12)  POCT Blood Glucose.: 213 mg/dL (01 Oct 2018 21:15)    TO BE DISCUSSED IN ROUNDS  A/P: 52 y/o M with strong FamHx of CAD/MI (4 grandparents  from [place], father  from [place], sister s/p CABG) and PMHx of COPD, Bronchiectasis, h/o MAC, HTN, DM, HTN, with abnormal stress test, who is s/p diagnostic cardiac cath at  on 18 revealing significant 3VCAD with unsuccessful PCI due to heavy calcification at the RCA ostium s/p midcab POD #1, now with hyperglycemia.    1.  DM--type 2, uncontrolled, complicated  Please continue lantus       units at night / morning.  Please continue lispro      units before each meal.  Please continue lispro moderate / low dose sliding scale four times daily with meals and at bedtime    Pt's fingerstick glucose goal is 100-150.    Will continue to monitor     For discharge, pt can most likely continue current regimen, insulin doses TBD by clinical course.    Pt can follow up at discharge with [facility] by calling  to make an appointment.   Will discuss case with Dr. Phealn  and update primary team HPI: 52 y/o M with PMHx of COPD, Bronchiectasis, h/o MAC, HTN, DM, HTN, and strong FamHx of CAD/MI (4 grandparents  from [place], father  from [place], sister s/p CABG)  with abnormal stress test on  and s/p diagnostic cardiac cath at  on 18 revealing significant 3VCAD with attempted PCI was unsuccessful due to heavy calcification at the RCA ostium, severely elevated EDP, diaphragmatic and posterobasal hypokinesis, EF 45%. Pt now s/p Mid CAB XT [place] to LAD POD #1 with plan to return 4 weeks post discharge for PCI, now with hyperglycemia. Pt examined in chair. Pt dyspneic with HF NC. Reports cough. Denies N/V. Appetite good. Pt on insulin drip overnight requiring approx 7 units/ hours. Drip discontinued at 8AM with . NPH 10 units given. Nutritional lispro 5 units started with meals with . Pt ate bread, butter, coffee, potatoes, and eggs. Lunch . Lispro 5+8 given. Pt ate chicken, couscous, soup, and yogurt.     Age at Dx: 37 years old  Hx and duration of insulin: 10 years  Current Therapy: glyBURIDE 5 mg oral tablet: 1 tab(s) orally once a day, Basaglar KwikPen 100 units/mL subcutaneous solution: 24 unit(s) subcutaneous once a day, HumuLIN R 100 units/mL injectable solution: 3 unit(s) injectable 5-10 times a day (with meals), metFORMIN 850 mg oral tablet: 1 tab(s) orally 3 times a day, Januvia 50 mg oral tablet: 1 tab(s) orally once a day, Lipitor 20 mg oral tablet, enalapril 5 mg oral tablet  Hx of hypoglycemia: denies  Hx of DKA/HHS: denies    Home FSG: check twice daily  Fastin-160, sometimes 200s  Dinner: 100s  Diet: wife cooks; eats one meal/day at dinner and has lighter meals for breakfast and lunch. Eats salad, fried fish, meat. Denies heavy carb intake.  Exercise: Bikes daily.    Complications: denies RN, denies DN  Outpatient Endo: managed by PCP    PM & Surgical Hx:PCI  CAD  Family history of CABG (Sibling)  Family history of acute myocardial infarction (Father, Grandparent)  Handoff  MEWS Score  Diabetes  HLD (hyperlipidemia)  HTN (hypertension)  CAD (coronary artery disease)  COPD (chronic obstructive pulmonary disease)  CAD (coronary artery disease)  CAD (coronary artery disease)  HFrEF (heart failure with reduced ejection fraction)  COPD (chronic obstructive pulmonary disease)  Diabetes  HTN (hypertension)  HLD (hyperlipidemia)  CAD (coronary artery disease)  MIDCAB, robot assisted  H/O chest tube placement      FH:  DM: strong  Thyroid: denies  Autoimmune: denies  Other: strong cardiac    SH:  Smoking: denies  Etoh: denies  Recreational Drugs: denies  Social Life: lives with wife    Current Meds:  acetaminophen   Tablet .. 650 milliGRAM(s) Oral every 6 hours PRN  aspirin enteric coated 81 milliGRAM(s) Oral daily  atorvastatin 80 milliGRAM(s) Oral at bedtime  buDESOnide 160 MICROgram(s)/formoterol 4.5 MICROgram(s) Inhaler 2 Puff(s) Inhalation two times a day  ceFAZolin  Injectable. 2000 milliGRAM(s) IV Push every 8 hours  chlorhexidine 2% Cloths 1 Application(s) Topical daily  clopidogrel Tablet 75 milliGRAM(s) Oral daily  dextrose 40% Gel 15 Gram(s) Oral once PRN  dextrose 5%. 1000 milliLiter(s) IV Continuous <Continuous>  dextrose 50% Injectable 50 milliLiter(s) IV Push every 15 minutes  dextrose 50% Injectable 25 milliLiter(s) IV Push every 15 minutes  dextrose 50% Injectable 12.5 Gram(s) IV Push once  dextrose 50% Injectable 25 Gram(s) IV Push once  dextrose 50% Injectable 25 Gram(s) IV Push once  docusate sodium 100 milliGRAM(s) Oral three times a day  glucagon  Injectable 1 milliGRAM(s) IntraMuscular once PRN  heparin  Injectable 5000 Unit(s) SubCutaneous every 8 hours  influenza   Vaccine 0.5 milliLiter(s) IntraMuscular once  insulin glargine Injectable (LANTUS) 20 Unit(s) SubCutaneous at bedtime  insulin Infusion 1 Unit(s)/Hr IV Continuous <Continuous>  insulin lispro (HumaLOG) corrective regimen sliding scale   SubCutaneous Before meals and at bedtime  insulin lispro Injectable (HumaLOG) 5 Unit(s) SubCutaneous three times a day before meals  levalbuterol Inhalation 0.63 milliGRAM(s) Inhalation every 6 hours PRN  metoprolol tartrate 12.5 milliGRAM(s) Oral every 6 hours  oxyCODONE    5 mG/acetaminophen 325 mG 1 Tablet(s) Oral every 4 hours PRN  oxyCODONE    5 mG/acetaminophen 325 mG 2 Tablet(s) Oral every 6 hours PRN  pantoprazole    Tablet 40 milliGRAM(s) Oral before breakfast  polyethylene glycol 3350 17 Gram(s) Oral once  senna 2 Tablet(s) Oral at bedtime  sodium chloride 0.9%. 1000 milliLiter(s) IV Continuous <Continuous>      Allergies:  No Known Allergies      ROS:  Denies the following except as indicated.    General: weight loss/weight gain, decreased appetite, fatigue  Eyes: Blurry vision, double vision, visual changes  ENT: Throat pain, changes in voice,   CV: palpitations, CP  GI: NVD, difficulty swallowing, abdominal pain  : polyuria, dysuria  Endo:  temperature intolerance, decreased libido  MSK: weakness, joint pain  Skin: rash, dryness, diaphoresis  Heme: Easy bruising, bleeding  Neuro: HA, dizziness, lightheadedness, numbness tingling  Psych: Anxiety, Depression    Vital Signs Last 24 Hrs  T(C): 37.9 (02 Oct 2018 13:00), Max: 37.9 (02 Oct 2018 13:00)  T(F): 100.2 (02 Oct 2018 13:00), Max: 100.2 (02 Oct 2018 13:00)  HR: 110 (02 Oct 2018 13:00) (90 - 116)  BP: 129/98 (02 Oct 2018 13:00) (125/54 - 163/82)  BP(mean): 109 (02 Oct 2018 13:00) (78 - 112)  RR: 38 (02 Oct 2018 13:) (12 - 38)  SpO2: 97% (02 Oct 2018 13:00) (91% - 100%)  Height (cm): 167.64 ( @ 20:35)  Weight (kg): 74.5 ( @ 20:35)  BMI (kg/m2): 26.5 ( @ 20:35)      Constitutional: wn/wd in NAD.   HEENT: NCAT, MMM, OP clear, EOMI, , no proptosis or lid retraction  Neck: no thyromegaly or palpable thyroid nodules   Respiratory: decreased BS at bases; crackles throughout  Cardiovascular: regular rhythm, normal S1 and S2, no audible murmurs, no peripheral edema  GI: soft, NT/ND, no masses/HSM appreciated.  Neurology: no tremors, DTR 2+  Skin: psoriatic lesions to lower and upper extremities  Psychiatric: AAO x 3, normal affect/mood.  Ext: radial pulses intact, DP pulses intact, extremities cool, no cyanosis, clubbing or edema.       LABS:                        13.6   14.4  )-----------( 283      ( 02 Oct 2018 02:20 )             41.1     10-02    129<L>  |  91<L>  |  13  ----------------------------<  237<H>  3.9   |  25  |  0.65    Ca    9.0      02 Oct 2018 02:21  Phos  3.9     10-02  Mg     2.1     10-02    TPro  6.8  /  Alb  3.5  /  TBili  0.4  /  DBili  x   /  AST  25  /  ALT  23  /  AlkPhos  58  10-02    PT/INR - ( 02 Oct 2018 02:21 )   PT: 12.3 sec;   INR: 1.11          PTT - ( 02 Oct 2018 02:21 )  PTT:23.1 sec    Hemoglobin A1C, Whole Blood: 8.8 ( @ 06:14)    Thyroid Stimulating Hormone, Serum: 1.914 ( @ 22:55)    CAPILLARY BLOOD GLUCOSE  POCT Blood Glucose.: 326 mg/dL (02 Oct 2018 11:39)  POCT Blood Glucose.: 213 mg/dL (02 Oct 2018 09:45)  POCT Blood Glucose.: 147 mg/dL (02 Oct 2018 08:06)  POCT Blood Glucose.: 134 mg/dL (02 Oct 2018 07:15)  POCT Blood Glucose.: 155 mg/dL (02 Oct 2018 06:11)  POCT Blood Glucose.: 137 mg/dL (02 Oct 2018 05:20)  POCT Blood Glucose.: 157 mg/dL (02 Oct 2018 04:14)  POCT Blood Glucose.: 189 mg/dL (02 Oct 2018 03:09)  POCT Blood Glucose.: 206 mg/dL (02 Oct 2018 02:12)  POCT Blood Glucose.: 181 mg/dL (01 Oct 2018 23:57)  POCT Blood Glucose.: 166 mg/dL (01 Oct 2018 23:12)  POCT Blood Glucose.: 213 mg/dL (01 Oct 2018 21:15)      A/P: 52 y/o M with strong FamHx of CAD/MI (4 grandparents  from [place], father  from [place], sister s/p CABG) and PMHx of COPD, Bronchiectasis, h/o MAC, HTN, DM, HTN, with abnormal stress test, who is s/p diagnostic cardiac cath at  on 18 revealing significant 3VCAD with unsuccessful PCI due to heavy calcification at the RCA ostium now s/p midcab POD #1, now with hyperglycemia.    1.  DM--type 2, uncontrolled, complicated  Please increase lantusto 25 units at night.  Please increase lispro to 6 units before each meal.  Please continue lispro moderate dose sliding scale four times daily with meals and at bedtime    Pt's fingerstick glucose goal is 100-150.    Will continue to monitor     For discharge, pt can most likely continue current regimen, insulin doses TBD by clinical course.    Pt can follow up at discharge with [facility] by calling  to make an appointment.   Will discuss case with Dr. Phelan  and update primary team

## 2018-10-02 NOTE — PHYSICAL THERAPY INITIAL EVALUATION ADULT - ACTIVE RANGE OF MOTION EXAMINATION, REHAB EVAL
within thoracotomy precautions, left side./bilateral  lower extremity Active ROM was WFL (within functional limits)/bilateral upper extremity Active ROM was WFL (within functional limits)

## 2018-10-02 NOTE — PHYSICAL THERAPY INITIAL EVALUATION ADULT - ADDITIONAL COMMENTS
Patient reports that he lives with his wife, 20 steps to enter the apartment. Reports being independent with mobility and ADLs prior to admission.

## 2018-10-02 NOTE — PROGRESS NOTE ADULT - SUBJECTIVE AND OBJECTIVE BOX
CTICU  CRITICAL  CARE  attending     Hand off received 					   Pertinent clinical, laboratory, radiographic, hemodynamic, echocardiographic, respiratory data, microbiologic data and chart were reviewed and analyzed frequently throughout the course of the day and night  Patient seen and examined with CTS/ SH attending at bedside  Pt is a 53y , Male, HEALTH ISSUES - PROBLEM Dx:  HFrEF (heart failure with reduced ejection fraction): HFrEF (heart failure with reduced ejection fraction)  COPD (chronic obstructive pulmonary disease): COPD (chronic obstructive pulmonary disease)  Diabetes: Diabetes  HTN (hypertension): HTN (hypertension)  HLD (hyperlipidemia): HLD (hyperlipidemia)  CAD (coronary artery disease): CAD (coronary artery disease)      , FAMILY HISTORY:  Family history of CABG (Sibling)  Family history of acute myocardial infarction (Father, Grandparent)  PAST MEDICAL & SURGICAL HISTORY:  Diabetes  HLD (hyperlipidemia)  HTN (hypertension)  CAD (coronary artery disease)  COPD (chronic obstructive pulmonary disease)  H/O chest tube placement    Patient is a 53y old  Male who presents with a chief complaint of Transfer from  for staged PCI of RCA (02 Oct 2018 13:28)      14 system review was unremarkable  acute changes include acute respiratory failure  Vital signs, hemodynamic and respiratory parameters were reviewed from the bedside nursing flowsheet.  ICU Vital Signs Last 24 Hrs  T(C): 37.9 (02 Oct 2018 13:00), Max: 37.9 (02 Oct 2018 13:00)  T(F): 100.2 (02 Oct 2018 13:00), Max: 100.2 (02 Oct 2018 13:00)  HR: 100 (02 Oct 2018 14:00) (90 - 116)  BP: 147/70 (02 Oct 2018 14:00) (125/54 - 163/82)  BP(mean): 101 (02 Oct 2018 14:00) (78 - 112)  ABP: 130/72 (02 Oct 2018 11:00) (92/68 - 188/100)  ABP(mean): 110 (02 Oct 2018 11:00) (78 - 138)  RR: 30 (02 Oct 2018 14:00) (12 - 38)  SpO2: 97% (02 Oct 2018 14:00) (91% - 100%)    Adult Advanced Hemodynamics Last 24 Hrs  CVP(mm Hg): 7 (02 Oct 2018 11:00) (-2 - 15)  CVP(cm H2O): --  CO: --  CI: --  PA: --  PA(mean): --  PCWP: --  SVR: --  SVRI: --  PVR: --  PVRI: --, ABG - ( 02 Oct 2018 03:16 )  pH, Arterial: 7.41  pH, Blood: x     /  pCO2: 41    /  pO2: 90    / HCO3: 25    / Base Excess: 0.5   /  SaO2: 96                Mode: CPAP with PS  FiO2: 40  PEEP: 5  PS: 12  MAP: 9  PIP: 18    Intake and output was reviewed and the fluid balance was calculated  Daily     Daily   I&O's Summary    01 Oct 2018 07:01  -  02 Oct 2018 07:00  --------------------------------------------------------  IN: 1357.3 mL / OUT: 2150 mL / NET: -792.7 mL    02 Oct 2018 07:01  -  02 Oct 2018 14:59  --------------------------------------------------------  IN: 547 mL / OUT: 725 mL / NET: -178 mL        All lines and drain sites were assessed  Glycemic [facility]      POCT Blood Glucose.: 326 mg/dL (02 Oct 2018 11:39)    No acute change in mental status  Auscultation of the chest reveals equal bs  Abdomen is soft  Extremities are warm and well perfused  Wounds appear clean and unremarkable  Antibiotics are periop    labs  CBC Full  -  ( 02 Oct 2018 13:56 )  WBC Count : 14.4 K/uL  Hemoglobin : 13.3 g/dL  Hematocrit : 40.9 %  Platelet Count - Automated : 277 K/uL  Mean Cell Volume : 88.9 fL  Mean Cell Hemoglobin : 28.9 pg  Mean Cell Hemoglobin Concentration : 32.5 g/dL  Auto Neutrophil # : x  Auto Lymphocyte # : x  Auto Monocyte # : x  Auto Eosinophil # : x  Auto Basophil # : x  Auto Neutrophil % : x  Auto Lymphocyte % : x  Auto Monocyte % : x  Auto Eosinophil % : x  Auto Basophil % : x    10-02    129<L>  |  90<L>  |  14  ----------------------------<  366<H>  4.9   |  26  |  0.76    Ca    10.1      02 Oct 2018 13:56  Phos  2.7     10-02  Mg     1.9     10-02    TPro  8.0  /  Alb  4.4  /  TBili  0.5  /  DBili  x   /  AST  24  /  ALT  19  /  AlkPhos  57  10-02    PT/INR - ( 02 Oct 2018 13:56 )   PT: 14.2 sec;   INR: 1.27          PTT - ( 02 Oct 2018 13:56 )  PTT:31.7 sec  The current medications were reviewed   MEDICATIONS  (STANDING):  aspirin enteric coated 81 milliGRAM(s) Oral daily  atorvastatin 80 milliGRAM(s) Oral at bedtime  buDESOnide 160 MICROgram(s)/formoterol 4.5 MICROgram(s) Inhaler 2 Puff(s) Inhalation two times a day  ceFAZolin  Injectable. 2000 milliGRAM(s) IV Push every 8 hours  chlorhexidine 2% Cloths 1 Application(s) Topical daily  clopidogrel Tablet 75 milliGRAM(s) Oral daily  dextrose 5%. 1000 milliLiter(s) (50 mL/Hr) IV Continuous <Continuous>  dextrose 50% Injectable 50 milliLiter(s) IV Push every 15 minutes  dextrose 50% Injectable 25 milliLiter(s) IV Push every 15 minutes  dextrose 50% Injectable 12.5 Gram(s) IV Push once  dextrose 50% Injectable 25 Gram(s) IV Push once  dextrose 50% Injectable 25 Gram(s) IV Push once  docusate sodium 100 milliGRAM(s) Oral three times a day  heparin  Injectable 5000 Unit(s) SubCutaneous every 8 hours  influenza   Vaccine 0.5 milliLiter(s) IntraMuscular once  insulin glargine Injectable (LANTUS) 20 Unit(s) SubCutaneous at bedtime  insulin lispro (HumaLOG) corrective regimen sliding scale   SubCutaneous Before meals and at bedtime  insulin lispro Injectable (HumaLOG) 5 Unit(s) SubCutaneous three times a day before meals  metoprolol tartrate 12.5 milliGRAM(s) Oral every 6 hours  pantoprazole    Tablet 40 milliGRAM(s) Oral before breakfast  polyethylene glycol 3350 17 Gram(s) Oral once  senna 2 Tablet(s) Oral at bedtime  sodium chloride 0.9%. 1000 milliLiter(s) (10 mL/Hr) IV Continuous <Continuous>    MEDICATIONS  (PRN):  acetaminophen   Tablet .. 650 milliGRAM(s) Oral every 6 hours PRN Mild Pain (1 - 3)  dextrose 40% Gel 15 Gram(s) Oral once PRN Blood Glucose LESS THAN 70 milliGRAM(s)/deciliter  glucagon  Injectable 1 milliGRAM(s) IntraMuscular once PRN Glucose LESS THAN 70 milligrams/deciliter  levalbuterol Inhalation 0.63 milliGRAM(s) Inhalation every 6 hours PRN wheezing/SOB  oxyCODONE    5 mG/acetaminophen 325 mG 1 Tablet(s) Oral every 4 hours PRN Moderate Pain (4 - 6)  oxyCODONE    5 mG/acetaminophen 325 mG 2 Tablet(s) Oral every 6 hours PRN Severe Pain (7 - 10)       PROBLEM LIST/ ASSESSMENT:  HEALTH ISSUES - PROBLEM Dx:  HFrEF (heart failure with reduced ejection fraction): HFrEF (heart failure with reduced ejection fraction)  COPD (chronic obstructive pulmonary disease): COPD (chronic obstructive pulmonary disease)  Diabetes: Diabetes  HTN (hypertension): HTN (hypertension)  HLD (hyperlipidemia): HLD (hyperlipidemia)  CAD (coronary artery disease): CAD (coronary artery disease)      ,   Patient is a 53y old  Male who presents with a chief complaint of Transfer from  for staged PCI of RCA (02 Oct 2018 13:28)     s/p cardiac surgery      My plan includes :  close hemodynamic, ventilatory and drain monitoring and management per post op routine    Monitor for arrhythmias and monitor parameters for organ perfusion  monitor neurologic status  Head of the bed should remain elevated to 45 deg .   chest PT and IS will be encouraged  monitor adequacy of oxygenation and ventilation and attempt to wean oxygen  Nutritional goals will be met using po eventually , ensure adequate caloric intake and montior the same  Stress ulcer and VTE prophylaxis will be achieved    Glycemic control is satisfactory  Electrolytes have been repleted as necessary and wound care has been carried out. Pain control has been achieved.   agressive physical therapy and early mobility and ambulation goals will be met   The family was updated about the course and plan  CRITICAL CARE TIME SPENT in evaluation and management, reassessments, review and interpretation of labs and x-rays, ventilator and hemodynamic management, formulating a plan and coordinating care: ___90____ MIN.  Time does not include procedural time.  CTICU ATTENDING     					    Issac Clemens MD

## 2018-10-02 NOTE — PHYSICAL THERAPY INITIAL EVALUATION ADULT - PERTINENT HX OF CURRENT PROBLEM, REHAB EVAL
54 y/o M who is s/p diagnostic cardiac cath at  on 9/27/18 revealing significant 3VCAD with unsuccessful PCI due to heavy calcification at the RCA ostium, now transferred to [facility], s/p MIDCAB 10/1.

## 2018-10-02 NOTE — PHYSICAL THERAPY INITIAL EVALUATION ADULT - MANUAL MUSCLE TESTING RESULTS, REHAB EVAL
B UE and B LE >3+/5 upon functional assessment against gravity. within thoracotomy precautions, left side.

## 2018-10-03 VITALS — HEIGHT: 66.14 IN | BODY MASS INDEX: 26.57 KG/M2 | WEIGHT: 165.35 LBS

## 2018-10-03 DIAGNOSIS — J44.9 CHRONIC OBSTRUCTIVE PULMONARY DISEASE, UNSPECIFIED: ICD-10-CM

## 2018-10-03 DIAGNOSIS — J47.1 BRONCHIECTASIS WITH (ACUTE) EXACERBATION: ICD-10-CM

## 2018-10-03 PROBLEM — Z86.39 HISTORY OF HYPERLIPIDEMIA: Status: RESOLVED | Noted: 2018-10-03 | Resolved: 2018-10-03

## 2018-10-03 PROBLEM — Z09 POSTOP CHECK: Status: ACTIVE | Noted: 2018-10-03

## 2018-10-03 PROBLEM — Z86.79 HISTORY OF HYPERTENSION: Status: RESOLVED | Noted: 2018-10-03 | Resolved: 2018-10-03

## 2018-10-03 PROBLEM — Z95.1 S/P CABG X 1: Status: ACTIVE | Noted: 2018-10-03

## 2018-10-03 PROBLEM — Z86.39 HISTORY OF DIABETES MELLITUS: Status: RESOLVED | Noted: 2018-10-03 | Resolved: 2018-10-03

## 2018-10-03 PROBLEM — Z87.09 HISTORY OF BRONCHIECTASIS: Status: RESOLVED | Noted: 2018-10-03 | Resolved: 2018-10-03

## 2018-10-03 PROBLEM — Z82.49 FAMILY HISTORY OF HEART ATTACK: Status: ACTIVE | Noted: 2018-10-03

## 2018-10-03 PROBLEM — Z87.09 HISTORY OF CHRONIC OBSTRUCTIVE LUNG DISEASE: Status: RESOLVED | Noted: 2018-10-03 | Resolved: 2018-10-03

## 2018-10-03 PROBLEM — I25.10 CAD (CORONARY ARTERY DISEASE): Status: ACTIVE | Noted: 2018-10-03

## 2018-10-03 LAB
ALBUMIN SERPL ELPH-MCNC: 4.1 G/DL — SIGNIFICANT CHANGE UP (ref 3.3–5)
ALP SERPL-CCNC: 62 U/L — SIGNIFICANT CHANGE UP (ref 40–120)
ALT FLD-CCNC: 21 U/L — SIGNIFICANT CHANGE UP (ref 10–45)
ANION GAP SERPL CALC-SCNC: 16 MMOL/L — SIGNIFICANT CHANGE UP (ref 5–17)
APTT BLD: 31.1 SEC — SIGNIFICANT CHANGE UP (ref 27.5–37.4)
AST SERPL-CCNC: 22 U/L — SIGNIFICANT CHANGE UP (ref 10–40)
BILIRUB SERPL-MCNC: 0.6 MG/DL — SIGNIFICANT CHANGE UP (ref 0.2–1.2)
BUN SERPL-MCNC: 11 MG/DL — SIGNIFICANT CHANGE UP (ref 7–23)
CALCIUM SERPL-MCNC: 9.5 MG/DL — SIGNIFICANT CHANGE UP (ref 8.4–10.5)
CHLORIDE SERPL-SCNC: 90 MMOL/L — LOW (ref 96–108)
CK MB CFR SERPL CALC: 4 NG/ML — SIGNIFICANT CHANGE UP (ref 0–6.7)
CK SERPL-CCNC: 395 U/L — HIGH (ref 30–200)
CO2 SERPL-SCNC: 23 MMOL/L — SIGNIFICANT CHANGE UP (ref 22–31)
CREAT SERPL-MCNC: 0.57 MG/DL — SIGNIFICANT CHANGE UP (ref 0.5–1.3)
GAS PNL BLDA: SIGNIFICANT CHANGE UP
GLUCOSE BLDC GLUCOMTR-MCNC: 217 MG/DL — HIGH (ref 70–99)
GLUCOSE BLDC GLUCOMTR-MCNC: 248 MG/DL — HIGH (ref 70–99)
GLUCOSE BLDC GLUCOMTR-MCNC: 263 MG/DL — HIGH (ref 70–99)
GLUCOSE BLDC GLUCOMTR-MCNC: 268 MG/DL — HIGH (ref 70–99)
GLUCOSE BLDC GLUCOMTR-MCNC: 309 MG/DL — HIGH (ref 70–99)
GLUCOSE SERPL-MCNC: 215 MG/DL — HIGH (ref 70–99)
GRAM STN FLD: SIGNIFICANT CHANGE UP
HCT VFR BLD CALC: 41.4 % — SIGNIFICANT CHANGE UP (ref 39–50)
HGB BLD-MCNC: 13.5 G/DL — SIGNIFICANT CHANGE UP (ref 13–17)
INR BLD: 1.21 — HIGH (ref 0.88–1.16)
LACTATE SERPL-SCNC: 1.4 MMOL/L — SIGNIFICANT CHANGE UP (ref 0.5–2)
MAGNESIUM SERPL-MCNC: 1.8 MG/DL — SIGNIFICANT CHANGE UP (ref 1.6–2.6)
MCHC RBC-ENTMCNC: 28.8 PG — SIGNIFICANT CHANGE UP (ref 27–34)
MCHC RBC-ENTMCNC: 32.6 G/DL — SIGNIFICANT CHANGE UP (ref 32–36)
MCV RBC AUTO: 88.5 FL — SIGNIFICANT CHANGE UP (ref 80–100)
PHOSPHATE SERPL-MCNC: 2.6 MG/DL — SIGNIFICANT CHANGE UP (ref 2.5–4.5)
PLATELET # BLD AUTO: 279 K/UL — SIGNIFICANT CHANGE UP (ref 150–400)
POTASSIUM SERPL-MCNC: 4.6 MMOL/L — SIGNIFICANT CHANGE UP (ref 3.5–5.3)
POTASSIUM SERPL-SCNC: 4.6 MMOL/L — SIGNIFICANT CHANGE UP (ref 3.5–5.3)
PROT SERPL-MCNC: 7.8 G/DL — SIGNIFICANT CHANGE UP (ref 6–8.3)
PROTHROM AB SERPL-ACNC: 13.5 SEC — HIGH (ref 9.8–12.7)
RBC # BLD: 4.68 M/UL — SIGNIFICANT CHANGE UP (ref 4.2–5.8)
RBC # FLD: 12.9 % — SIGNIFICANT CHANGE UP (ref 10.3–16.9)
SODIUM SERPL-SCNC: 129 MMOL/L — LOW (ref 135–145)
SPECIMEN SOURCE: SIGNIFICANT CHANGE UP
TROPONIN T SERPL-MCNC: <0.01 NG/ML — SIGNIFICANT CHANGE UP (ref 0–0.01)
WBC # BLD: 14.5 K/UL — HIGH (ref 3.8–10.5)
WBC # FLD AUTO: 14.5 K/UL — HIGH (ref 3.8–10.5)

## 2018-10-03 PROCEDURE — 71045 X-RAY EXAM CHEST 1 VIEW: CPT | Mod: 26

## 2018-10-03 PROCEDURE — 99223 1ST HOSP IP/OBS HIGH 75: CPT | Mod: GC

## 2018-10-03 RX ORDER — MAGNESIUM OXIDE 400 MG ORAL TABLET 241.3 MG
800 TABLET ORAL ONCE
Qty: 0 | Refills: 0 | Status: COMPLETED | OUTPATIENT
Start: 2018-10-03 | End: 2018-10-03

## 2018-10-03 RX ORDER — METOPROLOL TARTRATE 50 MG
12.5 TABLET ORAL ONCE
Qty: 0 | Refills: 0 | Status: DISCONTINUED | OUTPATIENT
Start: 2018-10-03 | End: 2018-10-03

## 2018-10-03 RX ORDER — ACETYLCYSTEINE 200 MG/ML
4 VIAL (ML) MISCELLANEOUS EVERY 12 HOURS
Qty: 0 | Refills: 0 | Status: DISCONTINUED | OUTPATIENT
Start: 2018-10-03 | End: 2018-10-04

## 2018-10-03 RX ORDER — KETOROLAC TROMETHAMINE 30 MG/ML
30 SYRINGE (ML) INJECTION EVERY 6 HOURS
Qty: 0 | Refills: 0 | Status: DISCONTINUED | OUTPATIENT
Start: 2018-10-03 | End: 2018-10-04

## 2018-10-03 RX ORDER — INSULIN GLARGINE 100 [IU]/ML
30 INJECTION, SOLUTION SUBCUTANEOUS AT BEDTIME
Qty: 0 | Refills: 0 | Status: DISCONTINUED | OUTPATIENT
Start: 2018-10-03 | End: 2018-10-04

## 2018-10-03 RX ORDER — SODIUM CHLORIDE 9 MG/ML
3 INJECTION INTRAMUSCULAR; INTRAVENOUS; SUBCUTANEOUS EVERY 8 HOURS
Qty: 0 | Refills: 0 | Status: DISCONTINUED | OUTPATIENT
Start: 2018-10-03 | End: 2018-10-04

## 2018-10-03 RX ORDER — METOPROLOL TARTRATE 50 MG
25 TABLET ORAL EVERY 8 HOURS
Qty: 0 | Refills: 0 | Status: DISCONTINUED | OUTPATIENT
Start: 2018-10-03 | End: 2018-10-04

## 2018-10-03 RX ORDER — METOPROLOL TARTRATE 50 MG
5 TABLET ORAL ONCE
Qty: 0 | Refills: 0 | Status: COMPLETED | OUTPATIENT
Start: 2018-10-03 | End: 2018-10-03

## 2018-10-03 RX ORDER — INSULIN LISPRO 100/ML
10 VIAL (ML) SUBCUTANEOUS
Qty: 0 | Refills: 0 | Status: DISCONTINUED | OUTPATIENT
Start: 2018-10-03 | End: 2018-10-04

## 2018-10-03 RX ORDER — LEVALBUTEROL 1.25 MG/.5ML
0.63 SOLUTION, CONCENTRATE RESPIRATORY (INHALATION) EVERY 4 HOURS
Qty: 0 | Refills: 0 | Status: DISCONTINUED | OUTPATIENT
Start: 2018-10-03 | End: 2018-10-04

## 2018-10-03 RX ADMIN — PANTOPRAZOLE SODIUM 40 MILLIGRAM(S): 20 TABLET, DELAYED RELEASE ORAL at 06:16

## 2018-10-03 RX ADMIN — Medication 100 MILLIGRAM(S): at 22:42

## 2018-10-03 RX ADMIN — Medication 4: at 22:43

## 2018-10-03 RX ADMIN — Medication 12.5 MILLIGRAM(S): at 12:08

## 2018-10-03 RX ADMIN — CHLORHEXIDINE GLUCONATE 1 APPLICATION(S): 213 SOLUTION TOPICAL at 12:32

## 2018-10-03 RX ADMIN — Medication 12.5 MILLIGRAM(S): at 06:16

## 2018-10-03 RX ADMIN — HEPARIN SODIUM 5000 UNIT(S): 5000 INJECTION INTRAVENOUS; SUBCUTANEOUS at 14:21

## 2018-10-03 RX ADMIN — Medication 250 MILLIGRAM(S): at 18:15

## 2018-10-03 RX ADMIN — Medication 6 UNIT(S): at 07:37

## 2018-10-03 RX ADMIN — Medication 81 MILLIGRAM(S): at 12:08

## 2018-10-03 RX ADMIN — Medication 5 MILLIGRAM(S): at 13:50

## 2018-10-03 RX ADMIN — CLOPIDOGREL BISULFATE 75 MILLIGRAM(S): 75 TABLET, FILM COATED ORAL at 12:32

## 2018-10-03 RX ADMIN — MAGNESIUM OXIDE 400 MG ORAL TABLET 800 MILLIGRAM(S): 241.3 TABLET ORAL at 09:24

## 2018-10-03 RX ADMIN — ATORVASTATIN CALCIUM 80 MILLIGRAM(S): 80 TABLET, FILM COATED ORAL at 22:42

## 2018-10-03 RX ADMIN — BUDESONIDE AND FORMOTEROL FUMARATE DIHYDRATE 2 PUFF(S): 160; 4.5 AEROSOL RESPIRATORY (INHALATION) at 17:40

## 2018-10-03 RX ADMIN — LEVALBUTEROL 0.63 MILLIGRAM(S): 1.25 SOLUTION, CONCENTRATE RESPIRATORY (INHALATION) at 14:21

## 2018-10-03 RX ADMIN — LEVALBUTEROL 0.63 MILLIGRAM(S): 1.25 SOLUTION, CONCENTRATE RESPIRATORY (INHALATION) at 17:40

## 2018-10-03 RX ADMIN — Medication 10 UNIT(S): at 17:42

## 2018-10-03 RX ADMIN — Medication 40 MILLIGRAM(S): at 06:17

## 2018-10-03 RX ADMIN — INSULIN GLARGINE 30 UNIT(S): 100 INJECTION, SOLUTION SUBCUTANEOUS at 22:42

## 2018-10-03 RX ADMIN — PIPERACILLIN AND TAZOBACTAM 200 GRAM(S): 4; .5 INJECTION, POWDER, LYOPHILIZED, FOR SOLUTION INTRAVENOUS at 06:14

## 2018-10-03 RX ADMIN — PIPERACILLIN AND TAZOBACTAM 200 GRAM(S): 4; .5 INJECTION, POWDER, LYOPHILIZED, FOR SOLUTION INTRAVENOUS at 12:11

## 2018-10-03 RX ADMIN — Medication 4 MILLILITER(S): at 19:58

## 2018-10-03 RX ADMIN — SODIUM CHLORIDE 3 MILLILITER(S): 9 INJECTION INTRAMUSCULAR; INTRAVENOUS; SUBCUTANEOUS at 14:12

## 2018-10-03 RX ADMIN — Medication 6 UNIT(S): at 12:09

## 2018-10-03 RX ADMIN — BUDESONIDE AND FORMOTEROL FUMARATE DIHYDRATE 2 PUFF(S): 160; 4.5 AEROSOL RESPIRATORY (INHALATION) at 06:19

## 2018-10-03 RX ADMIN — SODIUM CHLORIDE 3 MILLILITER(S): 9 INJECTION INTRAMUSCULAR; INTRAVENOUS; SUBCUTANEOUS at 22:48

## 2018-10-03 RX ADMIN — Medication 8: at 12:10

## 2018-10-03 RX ADMIN — PIPERACILLIN AND TAZOBACTAM 200 GRAM(S): 4; .5 INJECTION, POWDER, LYOPHILIZED, FOR SOLUTION INTRAVENOUS at 17:40

## 2018-10-03 RX ADMIN — Medication 25 MILLIGRAM(S): at 17:42

## 2018-10-03 RX ADMIN — SENNA PLUS 2 TABLET(S): 8.6 TABLET ORAL at 22:42

## 2018-10-03 RX ADMIN — LEVALBUTEROL 0.63 MILLIGRAM(S): 1.25 SOLUTION, CONCENTRATE RESPIRATORY (INHALATION) at 22:43

## 2018-10-03 RX ADMIN — Medication 6: at 17:41

## 2018-10-03 RX ADMIN — Medication 40 MILLIGRAM(S): at 14:22

## 2018-10-03 RX ADMIN — HEPARIN SODIUM 5000 UNIT(S): 5000 INJECTION INTRAVENOUS; SUBCUTANEOUS at 22:44

## 2018-10-03 RX ADMIN — HEPARIN SODIUM 5000 UNIT(S): 5000 INJECTION INTRAVENOUS; SUBCUTANEOUS at 06:14

## 2018-10-03 RX ADMIN — Medication 100 MILLIGRAM(S): at 06:17

## 2018-10-03 RX ADMIN — Medication 250 MILLIGRAM(S): at 06:16

## 2018-10-03 NOTE — CONSULT NOTE ADULT - ASSESSMENT
54 y/o Male with strong FamHx of CAD/MI and PMHx of COPD, Bronchiectasis, h/o MAC, HTN, HLD and IDDM, who had abnormal stress test on 9/18/18, is now s/p cardiac cath at  on 9/27/18 revealing 3V CAD, and unsuccessful attempted PCI due to heavy calcification at the RCA ostium, EF 45%. Cardiothoracic Surgery consulted, Dr. Rudy Cristobal, for surgical intervention of CAD.
Agree with SOAP 7 with suggested changes  in the dosages of lantus & humalog insulins .he is doing well & will follow & monitor him with you
53 year old male immigrant from [facility] with pmh of COPD, Bronchiectasis, h/o ?MAC ( per patient latent TB s/p treatment), HTN, DM, HTN who was transferred from OSH for Staged PCI now s/p Mid CAB now POD # 2. Now with worsening respiratory status.

## 2018-10-03 NOTE — CONSULT NOTE ADULT - REASON FOR ADMISSION
Transfer from  for staged PCI of RCA

## 2018-10-03 NOTE — CHART NOTE - NSCHARTNOTEFT_GEN_A_CORE
As discussed w/ Pulm team, azithromycin D/C'd and steroids changed to Prednisone 40mg PO QD.  Endocrine also suggested increasing Lantus to 30u QHS and Lispro to 10u TID w/ meals. As discussed w/ Pulm team, azithromycin D/C'd and steroids changed to Prednisone 40mg PO QD.  Endocrine also suggested increasing Lantus to 30u QHS and Lispro to 10u TID w/ meals.    CXR post CT pull shows no pneumothorax.  Official reading pending.  Left side w/ interstitial markings, similar to previously.

## 2018-10-03 NOTE — CONSULT NOTE ADULT - PROBLEM SELECTOR RECOMMENDATION 2
diff breathing
Unclear patient's baseline PFTs. Suspect more bronchiectasis component than COPD given lack of smoking history or exposure.     Plan as above.
Continue to monitor HR/BP/Tele.  - Observe for any acute changes.  - Continue Metoprolol 25mg PO BID, atorvastatin 40mg PO qhs.   - Please ensure patient takes metoprolol in the morning prior to surgery.   - Please avoid ACEi/ARB to prevent perioperative vasodilation, if needed, titrate up beta blocker or can start calcium channel blocker.  - Discontinue enalapril.

## 2018-10-03 NOTE — PROGRESS NOTE ADULT - PROBLEM SELECTOR PLAN 1
Neuro:  No pain, no acute issues.  No focal deficits. Percocet/Tylenol PRN.  Can use Toradol if needed as well.  Creatinine stable.    CV: -106, 's systolic.  Titrated beta blocker up to 25mg Q8 from 12.5mg Q6.  On ASA, plavix, statin and beta-blocker.     Pulm: Lung exam abnormal.  Pt with significant lung dz history. Dr. Cornejo's team called for consult as discussed on rounds this am.  The fellow saw the patient earlier.  Empiric abx were started yesterday as well as IV steroids.  Will F/U Pulm recs.  Encouraged more frequent IS use, goal for today is to get >500mL.  Pt seems to be in agreement.  Explained to the RN the importance of vigorous chest PT on him as well as ambulation as tolerated.  Will D/C chest tube, as the CXR on a clamped tube was stable.  Drainage is minimal, no pneumothorax on CXR.      GI: PO diet, tolerating thus far.  On GI prophylaxis.  Had a BM today.     : Voiding well.  Will D/C vitale for TOV.  Creatinine stable. BUN stable.    Heme: H/H stable.  On SC heparin for DVT prophylaxis.      ID: No issues, afebrile.  WBC 14, not abnormal to have mild leukocytosis post op day #2.  Will continue to monitor for any signs of infection.  IV sites are clean B/L.  RIJ TLC in place, day 2.  Will D/C that today as well.  **If he remains on IV vancomycin, will check vanco trough before 4th dose.     E-lytes.  Replaced Mg++.  K+ stable.  Na++ 129-->125-->129.  Continue to trend.  ?Start to fluid restrict if needed.      Dispo: Likely home candidate.  ?End of the week/weekend.  Dr. Cristobal to decide.

## 2018-10-03 NOTE — CHART NOTE - NSCHARTNOTEFT_GEN_A_CORE
Left chest tube removed as discussed with Dr. Cristobal on rounds.  CXR on clamp trial was stable, no air leak.      CT removed w/o incident, patient tolerated well.  Tie down suture and occlusive dressing applied.  CXR pending post removal.    Addendum: Pulm saw patient, recommends increasing Xopenex to Q4 hours, around the clock (not PRN).

## 2018-10-03 NOTE — PROGRESS NOTE ADULT - SUBJECTIVE AND OBJECTIVE BOX
INTERVAL HPI/OVERNIGHT EVENTS:    52 y/o M with strong FamHx of CAD/MI (4 grandparents  from [place], father  from [place], sister s/p CABG) and PMHx of COPD, Bronchiectasis, h/o MAC, HTN, DM, HTN, with abnormal stress test, who is s/p diagnostic cardiac cath at  on 18 revealing significant 3VCAD with unsuccessful PCI due to heavy calcification at the RCA ostium now s/p midcab, now with hyperglycemia. patient was given 100mg IV solumedrol on 10/2, and 80mg IV solumedrol today for tracheobronchitis. He was switched to prednisone 40mg PO once daily today.     FSG & Insulin received:  Yesterday:  pre-dinner fs  nutritional lispro 6 units + 8  units lispro SS  bedtime fs  lantus 25  units +  6  units lispro SS    Today:  pre-breakfast fs   nutritional lispro 6  units + 0  units lispro SS (not given 4 units as per SS for 217, FS was documented as 118 at 635 AM on worklist as reason why coverage not given)  pre-lunch fs  nutritional lispro 6  units+ 8  units lispro SS    Pt reports the following symptoms:  CONSTITUTIONAL:  Negative fever or chills, feels well, good appetite  EYES:  Negative  blurry vision or double vision  CARDIOVASCULAR:  Negative for chest pain or palpitations  RESPIRATORY:  Negative for cough, wheezing, or SOB   GASTROINTESTINAL:  Negative for nausea, vomiting, diarrhea, constipation, or abdominal pain  GENITOURINARY:  Negative frequency, urgency or dysuria  NEUROLOGIC:  No headache, confusion, dizziness, lightheadedness    MEDICATIONS  (STANDING):  aspirin enteric coated 81 milliGRAM(s) Oral daily  atorvastatin 80 milliGRAM(s) Oral at bedtime  buDESOnide 160 MICROgram(s)/formoterol 4.5 MICROgram(s) Inhaler 2 Puff(s) Inhalation two times a day  chlorhexidine 2% Cloths 1 Application(s) Topical daily  clopidogrel Tablet 75 milliGRAM(s) Oral daily  docusate sodium 100 milliGRAM(s) Oral three times a day  heparin  Injectable 5000 Unit(s) SubCutaneous every 8 hours  influenza   Vaccine 0.5 milliLiter(s) IntraMuscular once  insulin glargine Injectable (LANTUS) 30 Unit(s) SubCutaneous at bedtime  insulin lispro (HumaLOG) corrective regimen sliding scale   SubCutaneous Before meals and at bedtime  insulin lispro Injectable (HumaLOG) 10 Unit(s) SubCutaneous three times a day before meals  levalbuterol Inhalation 0.63 milliGRAM(s) Inhalation every 4 hours  metoprolol tartrate 25 milliGRAM(s) Oral every 8 hours  pantoprazole    Tablet 40 milliGRAM(s) Oral before breakfast  piperacillin/tazobactam IVPB. 3.375 Gram(s) IV Intermittent every 6 hours  polyethylene glycol 3350 17 Gram(s) Oral once  predniSONE   Tablet 40 milliGRAM(s) Oral daily  senna 2 Tablet(s) Oral at bedtime  sodium chloride 0.9% lock flush 3 milliLiter(s) IV Push every 8 hours  vancomycin  IVPB 1000 milliGRAM(s) IV Intermittent every 12 hours    MEDICATIONS  (PRN):  acetaminophen   Tablet .. 650 milliGRAM(s) Oral every 6 hours PRN Mild Pain (1 - 3)  dextrose 40% Gel 15 Gram(s) Oral once PRN Blood Glucose LESS THAN 70 milliGRAM(s)/deciliter  glucagon  Injectable 1 milliGRAM(s) IntraMuscular once PRN Glucose LESS THAN 70 milligrams/deciliter  ketorolac   Injectable 30 milliGRAM(s) IV Push every 6 hours PRN Severe Pain (7 - 10)  oxyCODONE    5 mG/acetaminophen 325 mG 1 Tablet(s) Oral every 4 hours PRN Moderate Pain (4 - 6)  oxyCODONE    5 mG/acetaminophen 325 mG 2 Tablet(s) Oral every 6 hours PRN Severe Pain (7 - 10)      PHYSICAL EXAM  Vital Signs Last 24 Hrs  T(C): 37.1 (03 Oct 2018 13:45), Max: 37.1 (03 Oct 2018 13:45)  T(F): 98.8 (03 Oct 2018 13:45), Max: 98.8 (03 Oct 2018 13:45)  HR: 102 (03 Oct 2018 14:15) (92 - 114)  BP: 132/89 (03 Oct 2018 14:15) (94/66 - 186/103)  BP(mean): 117 (03 Oct 2018 14:15) (76 - 150)  RR: 28 (03 Oct 2018 14:15) (20 - 34)  SpO2: 98% (03 Oct 2018 14:15) (95% - 100%)    Constitutional: wn/wd in NAD.   HEENT: NCAT, MMM, OP clear, EOMI, no proptosis or lid retraction  Neck: no thyromegaly or palpable thyroid nodules   Respiratory: lungs CTAB.  Cardiovascular: regular rhythm, normal S1 and S2, no audible murmurs, no peripheral edema  GI: soft, NT/ND, no masses/HSM appreciated.  Neurology: no tremors, DTR 2+  Skin: no visible rashes/lesions  Psychiatric: AAO x 3, normal affect/mood.    LABS:                        13.5   14.5  )-----------( 279      ( 03 Oct 2018 07:05 )             41.4     10-03    129<L>  |  90<L>  |  11  ----------------------------<  215<H>  4.6   |  23  |  0.57    Ca    9.5      03 Oct 2018 07:03  Phos  2.6     10-03  Mg     1.8     10-03    TPro  7.8  /  Alb  4.1  /  TBili  0.6  /  DBili  x   /  AST  22  /  ALT  21  /  AlkPhos  62  10-03    PT/INR - ( 03 Oct 2018 07:05 )   PT: 13.5 sec;   INR: 1.21          PTT - ( 03 Oct 2018 07:05 )  PTT:31.1 sec    Thyroid Stimulating Hormone, Serum: 1.914 uIU/mL ( @ 22:55)  HbA1C: 8.8 % ( @ 06:14)    CAPILLARY BLOOD GLUCOSE  POCT Blood Glucose.: 309 mg/dL (03 Oct 2018 11:52)  POCT Blood Glucose.: 217 mg/dL (03 Oct 2018 06:35)  POCT Blood Glucose.: 268 mg/dL (02 Oct 2018 23:19)  POCT Blood Glucose.: 309 mg/dL (02 Oct 2018 17:38)      A/P: 52 y/o M with strong FamHx of CAD/MI (4 grandparents  from [place], father  from [place], sister s/p CABG) and PMHx of COPD, Bronchiectasis, h/o MAC, HTN, DM, HTN, with abnormal stress test, who is s/p diagnostic cardiac cath at  on 18 revealing significant 3VCAD with unsuccessful PCI due to heavy calcification at the RCA ostium now s/p midcab POD #1, now with hyperglycemia.    1.  DM--type 2, uncontrolled, complicated  Please increase lantus to 30 units at night (patient was not given adequate coverage for FS of 217 this morning)  Please increase lispro to 10 units before each meal.    Please continue lispro moderate dose sliding scale four times daily with meals and at bedtime  Pt's fingerstick glucose goal is 100-150.  Will continue to monitor   For discharge, pt can most likely continue current regimen, insulin doses TBD by clinical course.    Pt can follow up at discharge with [facility] by calling  to make an appointment.   Will discuss case with Dr. Phelan  and update primary team

## 2018-10-03 NOTE — CONSULT NOTE ADULT - PROBLEM SELECTOR RECOMMENDATION 9
Patient with central airway bronchiectasis with upper lobe distrubution. As well as cavitary lesion and areas of ground glass and consolidation on CT.   Suspect Bronchiectasis is secondary to prior history of active TB. Patient is currently in bronchiectasis flare up/exacerbation with active TB being much less likely.     Recommendations:  - Agree with Abx , Would continue Vancomycin and Zosyn, Rec D/c Azithro  - Agree with steroids, however would de-escalate to PO prednisone 40 mg  - Consider hypertonic saline nebs  - Mucomyst nebulized, but would monitor closely as it has been shown to cause bronchospasm.   - Flutter valve device ( Aerobika )  - F/u Sputum culture  - Cont. resp support with high flow nasal cannula  - Cont. levalbuterol Q4 with additional PRN if needed.

## 2018-10-03 NOTE — PROGRESS NOTE ADULT - SUBJECTIVE AND OBJECTIVE BOX
Patient discussed on morning rounds with Dr. Cristobal    Operation / Date: 10/1/18 MIDCABx1 (LIMA-LAD) Pre-op EF 50%, intra-op EF 25%    SUBJECTIVE ASSESSMENT:  Patient states he feels "better" compared to last night.  He denies SOB at rest.  He just came back from a walk and per the RN, he reported SOB during his walk.  He denies dizziness or chest pain.  Denies cough, N/V/D, fever or chills.  He ate "a little breakfast", had some bread and "something else, I can't remember".  Had a small hard BM this am.  Using his IS, however was using it incorrectly before.  After education, using it correctly now, pulling a little less than 500mL.  Asking if he can go home.  He states he has Psoriasis, and feels that it's "better" than normal.     Vital Signs Last 24 Hrs  T(C): 36.4 (03 Oct 2018 10:01), Max: 37.9 (02 Oct 2018 13:00)  T(F): 97.6 (03 Oct 2018 10:01), Max: 100.2 (02 Oct 2018 13:00)  HR: 106 (03 Oct 2018 09:00) (90 - 110)  BP: 149/86 (03 Oct 2018 00:00) (94/66 - 165/88)  BP(mean): 111 (03 Oct 2018 00:00) (76 - 115)  RR: 28 (03 Oct 2018 09:00) (20 - 38)  SpO2: 97% (03 Oct 2018 09:00) (95% - 100%)  I&O's Detail    02 Oct 2018 07:01  -  03 Oct 2018 07:00  --------------------------------------------------------  IN:    Albumin 25%: 100 mL    Albumin 5%  - 250 mL: 250 mL    insulin Infusion: 7 mL    IV PiggyBack: 950 mL    Oral Fluid: 160 mL    sodium chloride 0.9%: 120 mL  Total IN: 1587 mL    OUT:    Chest Tube: 35 mL    Drain: 115 mL    Indwelling Catheter - Urethral: 2595 mL  Total OUT: 2745 mL    Total NET: -1158 mL      03 Oct 2018 07:01  -  03 Oct 2018 12:23  --------------------------------------------------------  IN:    IV PiggyBack: 250 mL    sodium chloride 0.9%: 10 mL  Total IN: 260 mL    OUT:    Chest Tube: 5 mL    Indwelling Catheter - Urethral: 300 mL  Total OUT: 305 mL    Total NET: -45 mL      CHEST TUBE:  Yes.  No air leak.  On waterseal.    FRANKY DRAIN:  No  EPICARDIAL WIRES: No  TIE DOWNS: No  KRISHNAMURTHY: Yes    PHYSICAL EXAM:    GEN: NAD, sitting up in chair, looks slightly tired with increased work of breathing.  But able to hold a conversation with me.    Psych: Mood appropriate  Neuro: A&Ox3.  No focal deficits.  Moving all extremities.   HEENT: No obvious abnormalities  CV: S1S2, regular, no murmurs appreciated.  No carotid bruits.  No JVD  Lungs: B/L diffuse rhonchi.  No wheezing appreciated.  No rales heard.   ABD: Soft, non-tender, non-distended.  +Bowel sounds  EXT: Warm and well perfused.  No peripheral edema noted. ++B/L skin rash 2/2 psoriasis on his legs (thighs and shins).  Trace-mild peripheral edema, non-pitting.    Musculoskeletal: Moving all extremities with normal ROM, no joint swelling  PV: Pedal pulses palpable  Incision Sites: Left thoracotomy healing well, open to air, no drainage.  Chest tube site clean and dry. Dressing in place.     LABS:                        13.5   14.5  )-----------( 279      ( 03 Oct 2018 07:05 )             41.4       COUMADIN:  No    PT/INR - ( 03 Oct 2018 07:05 )   PT: 13.5 sec;   INR: 1.21          PTT - ( 03 Oct 2018 07:05 )  PTT:31.1 sec    10-03    129<L>  |  90<L>  |  11  ----------------------------<  215<H>  4.6   |  23  |  0.57    Ca    9.5      03 Oct 2018 07:03  Phos  2.6     10-03  Mg     1.8     10-03    TPro  7.8  /  Alb  4.1  /  TBili  0.6  /  DBili  x   /  AST  22  /  ALT  21  /  AlkPhos  62  10-03          MEDICATIONS  (STANDING):  aspirin enteric coated 81 milliGRAM(s) Oral daily  atorvastatin 80 milliGRAM(s) Oral at bedtime  azithromycin  IVPB 500 milliGRAM(s) IV Intermittent every 24 hours  azithromycin  IVPB      buDESOnide 160 MICROgram(s)/formoterol 4.5 MICROgram(s) Inhaler 2 Puff(s) Inhalation two times a day  chlorhexidine 2% Cloths 1 Application(s) Topical daily  clopidogrel Tablet 75 milliGRAM(s) Oral daily  dextrose 50% Injectable 50 milliLiter(s) IV Push every 15 minutes  dextrose 50% Injectable 25 milliLiter(s) IV Push every 15 minutes  dextrose 50% Injectable 12.5 Gram(s) IV Push once  dextrose 50% Injectable 25 Gram(s) IV Push once  dextrose 50% Injectable 25 Gram(s) IV Push once  docusate sodium 100 milliGRAM(s) Oral three times a day  heparin  Injectable 5000 Unit(s) SubCutaneous every 8 hours  influenza   Vaccine 0.5 milliLiter(s) IntraMuscular once  insulin glargine Injectable (LANTUS) 25 Unit(s) SubCutaneous at bedtime  insulin lispro (HumaLOG) corrective regimen sliding scale   SubCutaneous Before meals and at bedtime  insulin lispro Injectable (HumaLOG) 6 Unit(s) SubCutaneous three times a day before meals  methylPREDNISolone sodium succinate Injectable 40 milliGRAM(s) IV Push three times a day  metoprolol tartrate 12.5 milliGRAM(s) Oral every 6 hours  pantoprazole    Tablet 40 milliGRAM(s) Oral before breakfast  piperacillin/tazobactam IVPB. 3.375 Gram(s) IV Intermittent every 6 hours  polyethylene glycol 3350 17 Gram(s) Oral once  senna 2 Tablet(s) Oral at bedtime  sodium chloride 0.9% lock flush 3 milliLiter(s) IV Push every 8 hours  vancomycin  IVPB 1000 milliGRAM(s) IV Intermittent every 12 hours    MEDICATIONS  (PRN):  acetaminophen   Tablet .. 650 milliGRAM(s) Oral every 6 hours PRN Mild Pain (1 - 3)  dextrose 40% Gel 15 Gram(s) Oral once PRN Blood Glucose LESS THAN 70 milliGRAM(s)/deciliter  glucagon  Injectable 1 milliGRAM(s) IntraMuscular once PRN Glucose LESS THAN 70 milligrams/deciliter  levalbuterol Inhalation 0.63 milliGRAM(s) Inhalation every 6 hours PRN wheezing/SOB  oxyCODONE    5 mG/acetaminophen 325 mG 1 Tablet(s) Oral every 4 hours PRN Moderate Pain (4 - 6)  oxyCODONE    5 mG/acetaminophen 325 mG 2 Tablet(s) Oral every 6 hours PRN Severe Pain (7 - 10)      RADIOLOGY & ADDITIONAL TESTS:  < from: Xray Chest 1 View- PORTABLE-Routine (10.02.18 @ 17:39) >  Support Devices:  Unchanged  Heart: Cardiomegaly  Mediastinum:  Unremarkable  Lungs/Airways: Tiny left apical pneumothorax resolved. Other findings   stable.  Bones/Soft tissues: Unremarkable    < end of copied text >

## 2018-10-03 NOTE — PROGRESS NOTE ADULT - SUBJECTIVE AND OBJECTIVE BOX
S/P MID [facility]  Pt is doing well in step down unit    PAST MEDICAL & SURGICAL HISTORY:  Diabetes  HLD (hyperlipidemia)  HTN (hypertension)  CAD (coronary artery disease)  COPD (chronic obstructive pulmonary disease)  H/O chest tube placement    MEDICATIONS  (STANDING):  aspirin enteric coated 81 milliGRAM(s) Oral daily  atorvastatin 80 milliGRAM(s) Oral at bedtime  buDESOnide 160 MICROgram(s)/formoterol 4.5 MICROgram(s) Inhaler 2 Puff(s) Inhalation two times a day  chlorhexidine 2% Cloths 1 Application(s) Topical daily  clopidogrel Tablet 75 milliGRAM(s) Oral daily  dextrose 50% Injectable 50 milliLiter(s) IV Push every 15 minutes  dextrose 50% Injectable 25 milliLiter(s) IV Push every 15 minutes  dextrose 50% Injectable 12.5 Gram(s) IV Push once  dextrose 50% Injectable 25 Gram(s) IV Push once  dextrose 50% Injectable 25 Gram(s) IV Push once  docusate sodium 100 milliGRAM(s) Oral three times a day  heparin  Injectable 5000 Unit(s) SubCutaneous every 8 hours  influenza   Vaccine 0.5 milliLiter(s) IntraMuscular once  insulin glargine Injectable (LANTUS) 30 Unit(s) SubCutaneous at bedtime  insulin lispro (HumaLOG) corrective regimen sliding scale   SubCutaneous Before meals and at bedtime  insulin lispro Injectable (HumaLOG) 10 Unit(s) SubCutaneous three times a day before meals  levalbuterol Inhalation 0.63 milliGRAM(s) Inhalation every 4 hours  metoprolol tartrate 25 milliGRAM(s) Oral every 8 hours  pantoprazole    Tablet 40 milliGRAM(s) Oral before breakfast  piperacillin/tazobactam IVPB. 3.375 Gram(s) IV Intermittent every 6 hours  polyethylene glycol 3350 17 Gram(s) Oral once  predniSONE   Tablet 40 milliGRAM(s) Oral daily  senna 2 Tablet(s) Oral at bedtime  sodium chloride 0.9% lock flush 3 milliLiter(s) IV Push every 8 hours  vancomycin  IVPB 1000 milliGRAM(s) IV Intermittent every 12 hours    MEDICATIONS  (PRN):  acetaminophen   Tablet .. 650 milliGRAM(s) Oral every 6 hours PRN Mild Pain (1 - 3)  dextrose 40% Gel 15 Gram(s) Oral once PRN Blood Glucose LESS THAN 70 milliGRAM(s)/deciliter  glucagon  Injectable 1 milliGRAM(s) IntraMuscular once PRN Glucose LESS THAN 70 milligrams/deciliter  ketorolac   Injectable 30 milliGRAM(s) IV Push every 6 hours PRN Severe Pain (7 - 10)  oxyCODONE    5 mG/acetaminophen 325 mG 1 Tablet(s) Oral every 4 hours PRN Moderate Pain (4 - 6)  oxyCODONE    5 mG/acetaminophen 325 mG 2 Tablet(s) Oral every 6 hours PRN Severe Pain (7 - 10)    ICU Vital Signs Last 24 Hrs  T(C): 36.1 (03 Oct 2018 17:19), Max: 37.1 (03 Oct 2018 13:45)  T(F): 96.9 (03 Oct 2018 17:19), Max: 98.8 (03 Oct 2018 13:45)  HR: 112 (03 Oct 2018 16:45) (92 - 114)  BP: 161/83 (03 Oct 2018 16:45) (94/66 - 186/103)  BP(mean): 108 (03 Oct 2018 16:45) (76 - 150)  ABP: 142/86 (03 Oct 2018 13:45) (140/88 - 172/82)  ABP(mean): 188 (03 Oct 2018 13:45) (90 - 188)  RR: 32 (03 Oct 2018 16:45) (20 - 34)  SpO2: 95% (03 Oct 2018 16:45) (95% - 100%)      lungs clearer  CV s1 s2  Abd soft  ext stable                          13.5   14.5  )-----------( 279      ( 03 Oct 2018 07:05 )             41.4   10-03    129<L>  |  90<L>  |  11  ----------------------------<  215<H>  4.6   |  23  |  0.57    Ca    9.5      03 Oct 2018 07:03  Phos  2.6     10-03  Mg     1.8     10-03    TPro  7.8  /  Alb  4.1  /  TBili  0.6  /  DBili  x   /  AST  22  /  ALT  21  /  AlkPhos  62  10-03  PT/INR - ( 03 Oct 2018 07:05 )   PT: 13.5 sec;   INR: 1.21          PTT - ( 03 Oct 2018 07:05 )  PTT:31.1 sec

## 2018-10-03 NOTE — CONSULT NOTE ADULT - ATTENDING COMMENTS
He is S/P mid CABG SMITH -LAD & doing well  7 will follow him with you .
Patient seen and examined with house-staff during bedside rounds.  Resident note read, including vitals, physical findings, laboratory data, and radiological reports.   Revisions included below.  Direct personal management at bed side and extensive interpretation of the data.  Plan was outlined and discussed in details with the housestaff.  Decision making of high complexity  Action taken for acute disease activity to reflect the level of care provided:  - medication reconciliation  - review laboratory data  The clinical picture is consistent with acute exacerbation of bronchiectasis. Patient has a history of TB in the past which was treated with nine-month off antibiotic. At this time there is no clinical evidence of reactivation of tuberculosis. The picture is consistent with mechanical ventilation and to operatively triggering the exacerbation of bronchiectasis. Send sputum for culture. Continue bronchodilators. Avoid quinolones and azithromycin.  Patient will require pulmonary toilet,mucolytics, and mobilization of the secretion with Aerobica

## 2018-10-03 NOTE — PROGRESS NOTE ADULT - ASSESSMENT
Patient is a 52 y/o male with strong family h/o of CAD/MI and PMHx of COPD, bronchiectasis, h/o MAC, HTN, DM and HTN who was initially had an abnormal stress test on 9/18/18 and was referred to [facility] for cardiac cath.  The cath on 9/27/18 revealing significant 3VCAD (ostial LAD 75%, prox LAD 40%, prox ramus 70%, prox RCA 20% stenosis, mid RCA 95%).  PCI was unsuccessful due to heavy calcification at the RCA ostium.  He also has severely elevated EDP, diaphragmatic and posterobasal hypokinesis, EF 45%.  Pt was transferred to [facility] with plan for staged PCI on 9/28/18 with Dr. Corrales.  Upon assessment, he was deemed a candidate for hybrid MIDCAB/PCI.  On 10/1/18 he underwent a MIDCABx1 (LIMA-LAD) EF 25%.  POD#1 had post-op afib, treated with amiodarone, back to NSR.  Leno drain removed, transferred to mini-ICU.  TLC/vitale remained.  In the evening, arterial line placed for desaturation.  ABG stable.  Empiric abx started per Dr. Clemens for tracheobronchitis, as well as IV steroids for his lungs.  POD#2 O2 saturation improved.  Needs continuous chest PT, ambulation, IS.  Can step-down to floor-care per Dr. Cristobal.

## 2018-10-03 NOTE — CONSULT NOTE ADULT - SUBJECTIVE AND OBJECTIVE BOX
HPI:  53 year old male immigrant from [facility] with pmh of COPD, Bronchiectasis, h/o ?MAC ( per patient latent TB s/p treatment), HTN, DM, HTN who was transferred from OSH for Staged PCI now s/p Mid CAB now POD # 2. Patient has been having increasingly worsening respiratory status with rising O2 requirements worsening cough and pulmonary exam. He was started on abx and steroids per primary team and pulmonary was consulted for further management recs.    Patient reports he never smoked, and has no childhood history of asthma. Was told he had COPD when he was first evaluated for cough several years ago. He was later told he had bronchiectasis. Patient unaware whether diagnosis of COPD was confirmed by PFTs or not. He also reports that soon after immigrating from [facility] he had a positive PPD and was treated for 9 months. Denies any active disease symptoms at the time. At baseline, patient has copious daily cough with mucous production. He feels the cough and mucous have been worse since admission to the hospital. Currently denying fevers and chills.      PAST MEDICAL & SURGICAL HISTORY:  Diabetes  HLD (hyperlipidemia)  HTN (hypertension)  CAD (coronary artery disease)  COPD (chronic obstructive pulmonary disease)  H/O chest tube placement    FAMILY HISTORY:  Family history of CABG (Sibling)  Family history of acute myocardial infarction (Father, Grandparent)    SOCIAL HISTORY:  Smoking Status: [ ] Current, [ ] Former, [X] Never    MEDICATIONS:  Pulmonary:  acetylcysteine 20% Inhalation 4 milliLiter(s) Inhalation every 12 hours  buDESOnide 160 MICROgram(s)/formoterol 4.5 MICROgram(s) Inhaler 2 Puff(s) Inhalation two times a day  levalbuterol Inhalation 0.63 milliGRAM(s) Inhalation every 4 hours    Antimicrobials:  piperacillin/tazobactam IVPB. 3.375 Gram(s) IV Intermittent every 6 hours  vancomycin  IVPB 1000 milliGRAM(s) IV Intermittent every 12 hours    Anticoagulants:  aspirin enteric coated 81 milliGRAM(s) Oral daily  clopidogrel Tablet 75 milliGRAM(s) Oral daily  heparin  Injectable 5000 Unit(s) SubCutaneous every 8 hours    Onc:    GI/:  docusate sodium 100 milliGRAM(s) Oral three times a day  pantoprazole    Tablet 40 milliGRAM(s) Oral before breakfast  polyethylene glycol 3350 17 Gram(s) Oral once  senna 2 Tablet(s) Oral at bedtime    Endocrine:  atorvastatin 80 milliGRAM(s) Oral at bedtime  dextrose 40% Gel 15 Gram(s) Oral once PRN  dextrose 50% Injectable 50 milliLiter(s) IV Push every 15 minutes  dextrose 50% Injectable 25 milliLiter(s) IV Push every 15 minutes  dextrose 50% Injectable 12.5 Gram(s) IV Push once  dextrose 50% Injectable 25 Gram(s) IV Push once  dextrose 50% Injectable 25 Gram(s) IV Push once  glucagon  Injectable 1 milliGRAM(s) IntraMuscular once PRN  insulin glargine Injectable (LANTUS) 30 Unit(s) SubCutaneous at bedtime  insulin lispro (HumaLOG) corrective regimen sliding scale   SubCutaneous Before meals and at bedtime  insulin lispro Injectable (HumaLOG) 10 Unit(s) SubCutaneous three times a day before meals  predniSONE   Tablet 40 milliGRAM(s) Oral daily    Cardiac:  metoprolol tartrate 25 milliGRAM(s) Oral every 8 hours    Other Medications:  acetaminophen   Tablet .. 650 milliGRAM(s) Oral every 6 hours PRN  atorvastatin 80 milliGRAM(s) Oral at bedtime  chlorhexidine 2% Cloths 1 Application(s) Topical daily  dextrose 40% Gel 15 Gram(s) Oral once PRN  dextrose 50% Injectable 50 milliLiter(s) IV Push every 15 minutes  dextrose 50% Injectable 25 milliLiter(s) IV Push every 15 minutes  dextrose 50% Injectable 12.5 Gram(s) IV Push once  dextrose 50% Injectable 25 Gram(s) IV Push once  dextrose 50% Injectable 25 Gram(s) IV Push once  glucagon  Injectable 1 milliGRAM(s) IntraMuscular once PRN  influenza   Vaccine 0.5 milliLiter(s) IntraMuscular once  insulin glargine Injectable (LANTUS) 30 Unit(s) SubCutaneous at bedtime  insulin lispro (HumaLOG) corrective regimen sliding scale   SubCutaneous Before meals and at bedtime  insulin lispro Injectable (HumaLOG) 10 Unit(s) SubCutaneous three times a day before meals  ketorolac   Injectable 30 milliGRAM(s) IV Push every 6 hours PRN  oxyCODONE    5 mG/acetaminophen 325 mG 1 Tablet(s) Oral every 4 hours PRN  oxyCODONE    5 mG/acetaminophen 325 mG 2 Tablet(s) Oral every 6 hours PRN  predniSONE   Tablet 40 milliGRAM(s) Oral daily  sodium chloride 0.9% lock flush 3 milliLiter(s) IV Push every 8 hours      Allergies    No Known Allergies    Intolerances        Vital Signs Last 24 Hrs  T(C): 36.1 (03 Oct 2018 17:19), Max: 37.1 (03 Oct 2018 13:45)  T(F): 96.9 (03 Oct 2018 17:19), Max: 98.8 (03 Oct 2018 13:45)  HR: 105 (03 Oct 2018 17:00) (92 - 114)  BP: 165/82 (03 Oct 2018 17:00) (94/66 - 186/103)  BP(mean): 108 (03 Oct 2018 17:00) (76 - 150)  RR: 28 (03 Oct 2018 17:00) (20 - 34)  SpO2: 96% (03 Oct 2018 17:00) (95% - 100%)    10-02 @ 07:01  -  10-03 @ 07:00  --------------------------------------------------------  IN: 1587 mL / OUT: 2745 mL / NET: -1158 mL    10-03 @ 07:01  -  10-03 @ 19:48  --------------------------------------------------------  IN: 1060 mL / OUT: 1180 mL / NET: -120 mL      Physical Exam:  General: NAD, AAO x3, appears in mild resp distress  HEENT: No icterus,. Moist mucous membranes  Neck: No JVD noted. Supple, no meningismus  Cardio: S1, S2 noted, RRR. No murmurs, rubs or gallops  Resp: Corse rhonchi throughout but worse on upper lung zones. Trace bibasilar crackles. occasional end expiratory wheezing.   Abdo: Soft, NT, bowel sounds present. No organomegaly  Extremities: No edema noted. Pulses present b/l  Neuro: AAO x3, grossly normal motor strength.  Lymphnodes: no lymphadenopathy identified.    LABS:  ABG - ( 03 Oct 2018 06:50 )  pH, Arterial: 7.40  pH, Blood: x     /  pCO2: 41    /  pO2: 83    / HCO3: 24    / Base Excess: -0.3  /  SaO2: 96        CBC Full  -  ( 03 Oct 2018 07:05 )  WBC Count : 14.5 K/uL  Hemoglobin : 13.5 g/dL  Hematocrit : 41.4 %  Platelet Count - Automated : 279 K/uL  Mean Cell Volume : 88.5 fL  Mean Cell Hemoglobin : 28.8 pg  Mean Cell Hemoglobin Concentration : 32.6 g/dL    10-03    129<L>  |  90<L>  |  11  ----------------------------<  215<H>  4.6   |  23  |  0.57    Ca    9.5      03 Oct 2018 07:03  Phos  2.6     10-03  Mg     1.8     10-03    TPro  7.8  /  Alb  4.1  /  TBili  0.6  /  DBili  x   /  AST  22  /  ALT  21  /  AlkPhos  62  10-03    PT/INR - ( 03 Oct 2018 07:05 )   PT: 13.5 sec;   INR: 1.21          PTT - ( 03 Oct 2018 07:05 )  PTT:31.1 sec    RADIOLOGY & ADDITIONAL STUDIES (The following images were personally reviewed):    CT Chest without contrast 09/29/2018    - There are multifocal stellate nodular areas in the posterolateral aspect   of the right middle lobe. in the right lung apex and in the superior segment   of the left lower lobe which are associated with areas of bronchiectasis.   This findings are felt to represent areas of parenchymal scarring.   Correlation with prior imaging, if available and a follow-up chest CT in one   year is recommended to document stability.     - Extensive atherosclerotic calcification throughout the coronary arteries.     - Focal dystrophic pleural calcifications along the apex of the right   hemidiaphragm.   Moderate varicoid bronchiectatic changes predominantly in the upper   lobes, right middle   meningeal and associated mild bronchial wall thickening and some areas of   probable chronic mucous   plugging. Some subtle areas of geographic ground glass opacity throughout   both lung fields which   can be seen with small airway inflammation/air trapping.   PLEURAL SPACE: Unremarkable. No pneumothorax. No significant effusion.   HEART: Unremarkable. No cardiomegaly. No significant pericardial effusion.   BONES/JOINTS: Unremarkable. No acute fracture. No dislocation.   SOFT TISSUES: Unremarkable.   VASCULATURE: The thoracic aorta normal caliber and configuration. Mild   calcification aortic arch.   No thoracic aortic aneurysm.   LYMPH NODES: Unremarkable. No enlarged lymph nodes.   UPPER ABDOMEN: A mild postinflammatory calcification along the posterior   right hemidiaphragm.

## 2018-10-04 ENCOUNTER — TRANSCRIPTION ENCOUNTER (OUTPATIENT)
Age: 53
End: 2018-10-04

## 2018-10-04 VITALS
OXYGEN SATURATION: 98 % | RESPIRATION RATE: 20 BRPM | HEART RATE: 99 BPM | DIASTOLIC BLOOD PRESSURE: 82 MMHG | SYSTOLIC BLOOD PRESSURE: 132 MMHG

## 2018-10-04 LAB
ANION GAP SERPL CALC-SCNC: 11 MMOL/L — SIGNIFICANT CHANGE UP (ref 5–17)
APTT BLD: 29.4 SEC — SIGNIFICANT CHANGE UP (ref 27.5–37.4)
BLD GP AB SCN SERPL QL: NEGATIVE — SIGNIFICANT CHANGE UP
BUN SERPL-MCNC: 15 MG/DL — SIGNIFICANT CHANGE UP (ref 7–23)
CALCIUM SERPL-MCNC: 9.6 MG/DL — SIGNIFICANT CHANGE UP (ref 8.4–10.5)
CHLORIDE SERPL-SCNC: 94 MMOL/L — LOW (ref 96–108)
CO2 SERPL-SCNC: 28 MMOL/L — SIGNIFICANT CHANGE UP (ref 22–31)
CREAT SERPL-MCNC: 0.77 MG/DL — SIGNIFICANT CHANGE UP (ref 0.5–1.3)
GLUCOSE BLDC GLUCOMTR-MCNC: 218 MG/DL — HIGH (ref 70–99)
GLUCOSE BLDC GLUCOMTR-MCNC: 282 MG/DL — HIGH (ref 70–99)
GLUCOSE BLDC GLUCOMTR-MCNC: 332 MG/DL — HIGH (ref 70–99)
GLUCOSE SERPL-MCNC: 236 MG/DL — HIGH (ref 70–99)
HCT VFR BLD CALC: 42.7 % — SIGNIFICANT CHANGE UP (ref 39–50)
HGB BLD-MCNC: 13.8 G/DL — SIGNIFICANT CHANGE UP (ref 13–17)
INR BLD: 1.15 — SIGNIFICANT CHANGE UP (ref 0.88–1.16)
MAGNESIUM SERPL-MCNC: 1.8 MG/DL — SIGNIFICANT CHANGE UP (ref 1.6–2.6)
MCHC RBC-ENTMCNC: 29.1 PG — SIGNIFICANT CHANGE UP (ref 27–34)
MCHC RBC-ENTMCNC: 32.3 G/DL — SIGNIFICANT CHANGE UP (ref 32–36)
MCV RBC AUTO: 89.9 FL — SIGNIFICANT CHANGE UP (ref 80–100)
PLATELET # BLD AUTO: 285 K/UL — SIGNIFICANT CHANGE UP (ref 150–400)
POTASSIUM SERPL-MCNC: 4.6 MMOL/L — SIGNIFICANT CHANGE UP (ref 3.5–5.3)
POTASSIUM SERPL-SCNC: 4.6 MMOL/L — SIGNIFICANT CHANGE UP (ref 3.5–5.3)
PROTHROM AB SERPL-ACNC: 12.8 SEC — HIGH (ref 9.8–12.7)
RBC # BLD: 4.75 M/UL — SIGNIFICANT CHANGE UP (ref 4.2–5.8)
RBC # FLD: 12.9 % — SIGNIFICANT CHANGE UP (ref 10.3–16.9)
RH IG SCN BLD-IMP: POSITIVE — SIGNIFICANT CHANGE UP
SODIUM SERPL-SCNC: 133 MMOL/L — LOW (ref 135–145)
VANCOMYCIN TROUGH SERPL-MCNC: 21.1 UG/ML — HIGH (ref 10–20)
WBC # BLD: 15.4 K/UL — HIGH (ref 3.8–10.5)
WBC # FLD AUTO: 15.4 K/UL — HIGH (ref 3.8–10.5)

## 2018-10-04 PROCEDURE — 99232 SBSQ HOSP IP/OBS MODERATE 35: CPT | Mod: GC

## 2018-10-04 PROCEDURE — 71045 X-RAY EXAM CHEST 1 VIEW: CPT | Mod: 26,59

## 2018-10-04 PROCEDURE — 71046 X-RAY EXAM CHEST 2 VIEWS: CPT | Mod: 26

## 2018-10-04 RX ORDER — CLOPIDOGREL BISULFATE 75 MG/1
1 TABLET, FILM COATED ORAL
Qty: 30 | Refills: 0 | OUTPATIENT
Start: 2018-10-04 | End: 2018-11-02

## 2018-10-04 RX ORDER — PANTOPRAZOLE SODIUM 20 MG/1
1 TABLET, DELAYED RELEASE ORAL
Qty: 30 | Refills: 0 | OUTPATIENT
Start: 2018-10-04 | End: 2018-11-02

## 2018-10-04 RX ORDER — METOPROLOL TARTRATE 50 MG
1 TABLET ORAL
Qty: 0 | Refills: 0 | COMMUNITY

## 2018-10-04 RX ORDER — INSULIN HUMAN 100 [IU]/ML
12 INJECTION, SOLUTION SUBCUTANEOUS
Qty: 1 | Refills: 0 | OUTPATIENT
Start: 2018-10-04 | End: 2018-11-02

## 2018-10-04 RX ORDER — FLUTICASONE PROPIONATE AND SALMETEROL 50; 250 UG/1; UG/1
2 POWDER ORAL; RESPIRATORY (INHALATION)
Qty: 0 | Refills: 0 | COMMUNITY

## 2018-10-04 RX ORDER — ATORVASTATIN CALCIUM 80 MG/1
1 TABLET, FILM COATED ORAL
Qty: 30 | Refills: 0 | OUTPATIENT
Start: 2018-10-04 | End: 2018-11-02

## 2018-10-04 RX ORDER — INSULIN LISPRO 100/ML
12 VIAL (ML) SUBCUTANEOUS
Qty: 0 | Refills: 0 | Status: DISCONTINUED | OUTPATIENT
Start: 2018-10-04 | End: 2018-10-04

## 2018-10-04 RX ORDER — ACETAMINOPHEN 500 MG
2 TABLET ORAL
Qty: 112 | Refills: 0 | OUTPATIENT
Start: 2018-10-04 | End: 2018-10-17

## 2018-10-04 RX ORDER — METOPROLOL TARTRATE 50 MG
1 TABLET ORAL
Qty: 60 | Refills: 0 | OUTPATIENT
Start: 2018-10-04 | End: 2018-11-02

## 2018-10-04 RX ORDER — ASPIRIN/CALCIUM CARB/MAGNESIUM 324 MG
1 TABLET ORAL
Qty: 30 | Refills: 0 | OUTPATIENT
Start: 2018-10-04 | End: 2018-11-02

## 2018-10-04 RX ORDER — FLUTICASONE PROPIONATE AND SALMETEROL 50; 250 UG/1; UG/1
2 POWDER ORAL; RESPIRATORY (INHALATION)
Qty: 1 | Refills: 0 | OUTPATIENT
Start: 2018-10-04 | End: 2018-11-02

## 2018-10-04 RX ORDER — DOCUSATE SODIUM 100 MG
1 CAPSULE ORAL
Qty: 90 | Refills: 0 | OUTPATIENT
Start: 2018-10-04 | End: 2018-11-02

## 2018-10-04 RX ORDER — METFORMIN HYDROCHLORIDE 850 MG/1
1 TABLET ORAL
Qty: 0 | Refills: 0 | COMMUNITY

## 2018-10-04 RX ORDER — METOPROLOL TARTRATE 50 MG
25 TABLET ORAL EVERY 6 HOURS
Qty: 0 | Refills: 0 | Status: DISCONTINUED | OUTPATIENT
Start: 2018-10-04 | End: 2018-10-04

## 2018-10-04 RX ORDER — VANCOMYCIN HCL 1 G
750 VIAL (EA) INTRAVENOUS EVERY 12 HOURS
Qty: 0 | Refills: 0 | Status: DISCONTINUED | OUTPATIENT
Start: 2018-10-04 | End: 2018-10-04

## 2018-10-04 RX ORDER — INSULIN GLARGINE 100 [IU]/ML
24 INJECTION, SOLUTION SUBCUTANEOUS
Qty: 0 | Refills: 0 | COMMUNITY

## 2018-10-04 RX ORDER — INSULIN GLARGINE 100 [IU]/ML
35 INJECTION, SOLUTION SUBCUTANEOUS AT BEDTIME
Qty: 0 | Refills: 0 | Status: DISCONTINUED | OUTPATIENT
Start: 2018-10-04 | End: 2018-10-04

## 2018-10-04 RX ORDER — ATORVASTATIN CALCIUM 80 MG/1
1 TABLET, FILM COATED ORAL
Qty: 0 | Refills: 0 | COMMUNITY

## 2018-10-04 RX ORDER — GLYBURIDE 5 MG
1 TABLET ORAL
Qty: 0 | Refills: 0 | COMMUNITY

## 2018-10-04 RX ORDER — INSULIN HUMAN 100 [IU]/ML
3 INJECTION, SOLUTION SUBCUTANEOUS
Qty: 0 | Refills: 0 | COMMUNITY

## 2018-10-04 RX ORDER — INSULIN GLARGINE 100 [IU]/ML
35 INJECTION, SOLUTION SUBCUTANEOUS
Qty: 1 | Refills: 0 | OUTPATIENT
Start: 2018-10-04 | End: 2018-11-02

## 2018-10-04 RX ORDER — SITAGLIPTIN 50 MG/1
1 TABLET, FILM COATED ORAL
Qty: 0 | Refills: 0 | COMMUNITY

## 2018-10-04 RX ORDER — MAGNESIUM OXIDE 400 MG ORAL TABLET 241.3 MG
800 TABLET ORAL ONCE
Qty: 0 | Refills: 0 | Status: COMPLETED | OUTPATIENT
Start: 2018-10-04 | End: 2018-10-04

## 2018-10-04 RX ADMIN — INFLUENZA VIRUS VACCINE 0.5 MILLILITER(S): 15; 15; 15; 15 SUSPENSION INTRAMUSCULAR at 17:12

## 2018-10-04 RX ADMIN — LEVALBUTEROL 0.63 MILLIGRAM(S): 1.25 SOLUTION, CONCENTRATE RESPIRATORY (INHALATION) at 02:15

## 2018-10-04 RX ADMIN — Medication 81 MILLIGRAM(S): at 12:45

## 2018-10-04 RX ADMIN — Medication 10 UNIT(S): at 07:25

## 2018-10-04 RX ADMIN — PIPERACILLIN AND TAZOBACTAM 200 GRAM(S): 4; .5 INJECTION, POWDER, LYOPHILIZED, FOR SOLUTION INTRAVENOUS at 06:23

## 2018-10-04 RX ADMIN — SODIUM CHLORIDE 3 MILLILITER(S): 9 INJECTION INTRAMUSCULAR; INTRAVENOUS; SUBCUTANEOUS at 06:37

## 2018-10-04 RX ADMIN — Medication 6: at 12:45

## 2018-10-04 RX ADMIN — HEPARIN SODIUM 5000 UNIT(S): 5000 INJECTION INTRAVENOUS; SUBCUTANEOUS at 13:12

## 2018-10-04 RX ADMIN — Medication 25 MILLIGRAM(S): at 12:48

## 2018-10-04 RX ADMIN — CLOPIDOGREL BISULFATE 75 MILLIGRAM(S): 75 TABLET, FILM COATED ORAL at 12:46

## 2018-10-04 RX ADMIN — Medication 12 UNIT(S): at 17:12

## 2018-10-04 RX ADMIN — Medication 12 UNIT(S): at 12:46

## 2018-10-04 RX ADMIN — Medication 8: at 16:58

## 2018-10-04 RX ADMIN — Medication 100 MILLIGRAM(S): at 06:23

## 2018-10-04 RX ADMIN — Medication 40 MILLIGRAM(S): at 06:26

## 2018-10-04 RX ADMIN — Medication 25 MILLIGRAM(S): at 17:14

## 2018-10-04 RX ADMIN — PIPERACILLIN AND TAZOBACTAM 200 GRAM(S): 4; .5 INJECTION, POWDER, LYOPHILIZED, FOR SOLUTION INTRAVENOUS at 00:23

## 2018-10-04 RX ADMIN — BUDESONIDE AND FORMOTEROL FUMARATE DIHYDRATE 2 PUFF(S): 160; 4.5 AEROSOL RESPIRATORY (INHALATION) at 06:23

## 2018-10-04 RX ADMIN — MAGNESIUM OXIDE 400 MG ORAL TABLET 800 MILLIGRAM(S): 241.3 TABLET ORAL at 12:46

## 2018-10-04 RX ADMIN — HEPARIN SODIUM 5000 UNIT(S): 5000 INJECTION INTRAVENOUS; SUBCUTANEOUS at 06:23

## 2018-10-04 RX ADMIN — SODIUM CHLORIDE 3 MILLILITER(S): 9 INJECTION INTRAMUSCULAR; INTRAVENOUS; SUBCUTANEOUS at 13:12

## 2018-10-04 RX ADMIN — Medication 4: at 07:23

## 2018-10-04 RX ADMIN — Medication 4 MILLILITER(S): at 06:22

## 2018-10-04 RX ADMIN — LEVALBUTEROL 0.63 MILLIGRAM(S): 1.25 SOLUTION, CONCENTRATE RESPIRATORY (INHALATION) at 06:22

## 2018-10-04 RX ADMIN — PANTOPRAZOLE SODIUM 40 MILLIGRAM(S): 20 TABLET, DELAYED RELEASE ORAL at 06:24

## 2018-10-04 RX ADMIN — Medication 25 MILLIGRAM(S): at 02:15

## 2018-10-04 NOTE — DISCHARGE NOTE ADULT - CARE PROVIDERS DIRECT ADDRESSES
,[email or web address]

## 2018-10-04 NOTE — PROGRESS NOTE ADULT - PROBLEM SELECTOR PLAN 1
Neuro:  No pain, no acute issues.  No focal deficits. Percocet/Tylenol PRN.  Can use Toradol if needed as well.  Creatinine stable.    CV: HR , 's systolic.  Titrated beta blocker up again today to 25mg Q6 from 25mg Q8.  On ASA, plavix, statin and beta-blocker for CAD.     Pulm: Lung exam abnormal.  Pt with significant lung dz history (bronchiectasis related to previous TB).  Dr. Cornejo following.  Respiratory status improving.  Continue on PO steroids 40mg Prednisone x 4 more days then stop.  Also suggested stopping abx as he was never infected.  I encouraged continued IS use and ambulation today.  CT removed yesterday, CXR stable.        GI: PO diet, tolerating thus far.  On GI prophylaxis.  Had a BM yeseterday.     : Voiding well.  Mazariegos removed at 7am, F/U TOV.  Creatinine stable. BUN stable.    Heme: H/H stable.  On SC heparin for DVT prophylaxis.      ID: No issues, afebrile.  WBC 15, not abnormal to have mild leukocytosis post op.  Will continue to monitor for any signs of infection.  IV sites are clean B/L.  RIJ TLC removed this am.  D/C empiric abx as discussed.       Endo: Insulin regimen adjusted as FS have been high due to the steroids.  Will discuss w/ Endo team re: home regimen prior to discharge.     E-lytes.  Replaced Mg++.  K+ stable.  Na++ up to 133. Continue to trend while in-house.      Dispo: Home candidate.  Patient is eager to go home today, feels that he will recover better at home.  However, he may need to stay one more night for more rehab, IS, etc.  Will readdress in the afternoon. Neuro:  No pain, no acute issues.  No focal deficits. Percocet/Tylenol PRN.  Can use Toradol if needed as well.  Creatinine stable.    CV: HR , 's systolic.  Titrated beta blocker up again today to 25mg Q6 from 25mg Q8.  On ASA, plavix, statin and beta-blocker for CAD.     Pulm: Lung exam abnormal.  Pt with significant lung dz history (bronchiectasis related to previous TB).  Dr. Cornejo following.  Respiratory status improving.  Continue on PO steroids 40mg Prednisone x 4 more days then stop.  Also suggested stopping abx as he was never infected.  I encouraged continued IS use and ambulation today.  CT removed yesterday, CXR stable.        GI: PO diet, tolerating thus far.  On GI prophylaxis.  Had a BM yeseterday.     : Voiding well.  Mazariegos removed at 7am, F/U TOV.  Creatinine stable. BUN stable.    Heme: H/H stable.  On SC heparin for DVT prophylaxis.      ID: No issues, afebrile.  WBC 15, not abnormal to have mild leukocytosis post op.  Will continue to monitor for any signs of infection.  IV sites are clean B/L.  RIJ TLC removed this am.  D/C empiric abx as discussed.       Endo: Insulin regimen adjusted as FS have been high due to the steroids.  Will discuss w/ Endo team re: home regimen prior to discharge.   E-lytes.  Replaced Mg++.  K+ stable.  Na++ up to 133. Continue to trend while in-house.    Dispo: Home candidate.  Patient is eager to go home today, feels that he will recover better at home.  However, he may need to stay one more night for more rehab, IS, etc.  Will readdress in the afternoon.  ***Addendum: It was decided that patient is stable for D/C home per Dr. Cristobal.

## 2018-10-04 NOTE — DISCHARGE NOTE ADULT - PLAN OF CARE
To recover from surgery -Please follow up with Dr. Cristobal on Tuesday 10/9/18 @ 9:00am.  The office is located at [facility], [address] [place] [address], 4th floor. Call us with any questions #[phone].    -Walk daily as tolerated (try for multiple times per day) and use your incentive spirometer every hour.    -No driving or strenuous activity/exercise for 6 weeks, or until cleared by your surgeon.    -Gently clean your incisions with anti-bacterial soap and water, pat dry.  You may leave them open to air.    -Call your doctor if you have shortness of breath, chest pain not relieved by pain medication, dizziness, fever >101.5, or increased redness or drainage from incisions. Controlled blood sugars Please continue to take your insulin, however the doses are DIFFERENT from your home doses prior to the surgery.  Continue with the doses listed in the discharge medication list and please follow up with the endocrine team in 1 week.  The office number is #[phone].  You can see Dr. Lyn, or any of the nurse practitioners in the office.  Please call tomorrow to schedule an appointment. To feel better You were followed by the Pulmonology team (Dr. Cornejo) during your stay.  You will continue with oral steroids for 4 more days.  Please see Dr. Cornejo in 1 week. His office number and location are listed below.  Please call to schedule an appointment.  Continue You were followed by the Pulmonology team (Dr. Cornejo) during your stay.  You will continue with oral steroids for 4 more days.  Please see Dr. Cornejo in 1 week. His office number and location are listed below.  Please call to schedule an appointment.  Continue taking your nebulizers.  Bring a copy of your discharge paperwork to all of your follow up appointments. Please follow up with Dr. Corrales in 3-4 weeks for staged PCI (stenting of coronary artery).  Office number and location listed below, however the following is the [facility] location--> [address] [facility] [address], [place] [address].  Phone #[phone] -Please follow up with Dr. Cristobal on Tuesday 10/9/18 @ 10:00am.  You will have a CXR prior to your office visit.  Radiology department is located on the 3rd floor of the main building.      The cardiac surgery office is located at [facility], [address] [place] [address], 4th floor. Call us with any questions #[phone].    -Walk daily as tolerated (try for multiple times per day) and use your incentive spirometer every hour.    -No driving or strenuous activity/exercise for 6 weeks, or until cleared by your surgeon.    -Gently clean your incisions with anti-bacterial soap and water, pat dry.  You may leave them open to air.    -Call your doctor if you have shortness of breath, chest pain not relieved by pain medication, dizziness, fever >101.5, or increased redness or drainage from incisions.

## 2018-10-04 NOTE — DISCHARGE NOTE ADULT - MEDICATION SUMMARY - MEDICATIONS TO STOP TAKING
I will STOP taking the medications listed below when I get home from the hospital:    Lipitor 20 mg oral tablet  -- 1 tab(s) by mouth once a day    enalapril 5 mg oral tablet  -- 1 tab(s) by mouth once a day    glyBURIDE 5 mg oral tablet  -- 1 tab(s) by mouth once a day    Basaglar KwikPen 100 units/mL subcutaneous solution  -- 24 unit(s) subcutaneous once a day    HumuLIN R 100 units/mL injectable solution  -- 3 unit(s) injectable 3 times a day (with meals)    metFORMIN 850 mg oral tablet  -- 1 tab(s) by mouth 3 times a day    Metoprolol Tartrate 25 mg oral tablet  -- 1 tab(s) by mouth 2 times a day    Januvia 50 mg oral tablet  -- 1 tab(s) by mouth once a day

## 2018-10-04 NOTE — PROGRESS NOTE ADULT - SUBJECTIVE AND OBJECTIVE BOX
PT IS STABLE    S/P mid cab    ICU Vital Signs Last 24 Hrs  T(C): 36.4 (04 Oct 2018 10:14), Max: 37.1 (03 Oct 2018 13:45)  T(F): 97.5 (04 Oct 2018 10:14), Max: 98.8 (03 Oct 2018 13:45)  HR: 102 (04 Oct 2018 08:53) (82 - 114)  BP: 141/71 (04 Oct 2018 08:45) (127/80 - 186/103)  BP(mean): 96 (04 Oct 2018 08:45) (96 - 150)  ABP: 142/86 (03 Oct 2018 13:45) (142/86 - 155/78)  ABP(mean): 188 (03 Oct 2018 13:45) (109 - 188)  RR: 26 (04 Oct 2018 08:53) (21 - 34)  SpO2: 97% (04 Oct 2018 08:53) (84% - 100%)    MEDICATIONS  (STANDING):  acetylcysteine 20% Inhalation 4 milliLiter(s) Inhalation every 12 hours  aspirin enteric coated 81 milliGRAM(s) Oral daily  atorvastatin 80 milliGRAM(s) Oral at bedtime  buDESOnide 160 MICROgram(s)/formoterol 4.5 MICROgram(s) Inhaler 2 Puff(s) Inhalation two times a day  chlorhexidine 2% Cloths 1 Application(s) Topical daily  clopidogrel Tablet 75 milliGRAM(s) Oral daily  dextrose 50% Injectable 50 milliLiter(s) IV Push every 15 minutes  dextrose 50% Injectable 25 milliLiter(s) IV Push every 15 minutes  dextrose 50% Injectable 12.5 Gram(s) IV Push once  dextrose 50% Injectable 25 Gram(s) IV Push once  dextrose 50% Injectable 25 Gram(s) IV Push once  docusate sodium 100 milliGRAM(s) Oral three times a day  heparin  Injectable 5000 Unit(s) SubCutaneous every 8 hours  influenza   Vaccine 0.5 milliLiter(s) IntraMuscular once  insulin glargine Injectable (LANTUS) 35 Unit(s) SubCutaneous at bedtime  insulin lispro (HumaLOG) corrective regimen sliding scale   SubCutaneous Before meals and at bedtime  insulin lispro Injectable (HumaLOG) 12 Unit(s) SubCutaneous three times a day before meals  levalbuterol Inhalation 0.63 milliGRAM(s) Inhalation every 4 hours  magnesium oxide 800 milliGRAM(s) Oral once  metoprolol tartrate 25 milliGRAM(s) Oral every 6 hours  pantoprazole    Tablet 40 milliGRAM(s) Oral before breakfast  polyethylene glycol 3350 17 Gram(s) Oral once  senna 2 Tablet(s) Oral at bedtime  sodium chloride 0.9% lock flush 3 milliLiter(s) IV Push every 8 hours    MEDICATIONS  (PRN):  acetaminophen   Tablet .. 650 milliGRAM(s) Oral every 6 hours PRN Mild Pain (1 - 3)  dextrose 40% Gel 15 Gram(s) Oral once PRN Blood Glucose LESS THAN 70 milliGRAM(s)/deciliter  glucagon  Injectable 1 milliGRAM(s) IntraMuscular once PRN Glucose LESS THAN 70 milligrams/deciliter  ketorolac   Injectable 30 milliGRAM(s) IV Push every 6 hours PRN Severe Pain (7 - 10)  oxyCODONE    5 mG/acetaminophen 325 mG 1 Tablet(s) Oral every 4 hours PRN Moderate Pain (4 - 6)  oxyCODONE    5 mG/acetaminophen 325 mG 2 Tablet(s) Oral every 6 hours PRN Severe Pain (7 - 10)    PAST MEDICAL & SURGICAL HISTORY:  Diabetes  HLD (hyperlipidemia)  HTN (hypertension)  CAD (coronary artery disease)  COPD (chronic obstructive pulmonary disease)  H/O chest tube placement      LUNGS CLEAR   cV S 1 S2  ABD SOFT  eXT STABLE                          13.8   15.4  )-----------( 285      ( 04 Oct 2018 06:09 )             42.7   10-04    133<L>  |  94<L>  |  15  ----------------------------<  236<H>  4.6   |  28  |  0.77    Ca    9.6      04 Oct 2018 06:09  Phos  2.6     10-03  Mg     1.8     10-04    TPro  7.8  /  Alb  4.1  /  TBili  0.6  /  DBili  x   /  AST  22  /  ALT  21  /  AlkPhos  62  10-03

## 2018-10-04 NOTE — DISCHARGE NOTE ADULT - CARE PLAN
Principal Discharge DX:	CAD (coronary artery disease)  Goal:	To recover from surgery  Assessment and plan of treatment:	-Please follow up with Dr. Cristobal on Tuesday 10/9/18 @ 9:00am.  The office is located at [facility], [address] [place] [address], 4th floor. Call us with any questions #[phone].    -Walk daily as tolerated (try for multiple times per day) and use your incentive spirometer every hour.    -No driving or strenuous activity/exercise for 6 weeks, or until cleared by your surgeon.    -Gently clean your incisions with anti-bacterial soap and water, pat dry.  You may leave them open to air.    -Call your doctor if you have shortness of breath, chest pain not relieved by pain medication, dizziness, fever >101.5, or increased redness or drainage from incisions.  Secondary Diagnosis:	Diabetes  Goal:	Controlled blood sugars  Assessment and plan of treatment:	Please continue to take your insulin, however the doses are DIFFERENT from your home doses prior to the surgery.  Continue with the doses listed in the discharge medication list and please follow up with the endocrine team in 1 week.  The office number is #[phone].  You can see Dr. Lyn, or any of the nurse practitioners in the office.  Please call tomorrow to schedule an appointment.  Secondary Diagnosis:	Bronchiectasis with acute exacerbation  Goal:	To feel better  Assessment and plan of treatment:	You were followed by the Pulmonology team (Dr. Cornejo) during your stay.  You will continue with oral steroids for 4 more days.  Please see Dr. Cornejo in 1 week. His office number and location are listed below.  Please call to schedule an appointment.  Continue Principal Discharge DX:	CAD (coronary artery disease)  Goal:	To recover from surgery  Assessment and plan of treatment:	-Please follow up with Dr. Cristobal on Tuesday 10/9/18 @ 9:00am.  The office is located at [facility], [address] [place] [address], 4th floor. Call us with any questions #[phone].    -Walk daily as tolerated (try for multiple times per day) and use your incentive spirometer every hour.    -No driving or strenuous activity/exercise for 6 weeks, or until cleared by your surgeon.    -Gently clean your incisions with anti-bacterial soap and water, pat dry.  You may leave them open to air.    -Call your doctor if you have shortness of breath, chest pain not relieved by pain medication, dizziness, fever >101.5, or increased redness or drainage from incisions.  Secondary Diagnosis:	Diabetes  Goal:	Controlled blood sugars  Assessment and plan of treatment:	Please continue to take your insulin, however the doses are DIFFERENT from your home doses prior to the surgery.  Continue with the doses listed in the discharge medication list and please follow up with the endocrine team in 1 week.  The office number is #[phone].  You can see Dr. Lyn, or any of the nurse practitioners in the office.  Please call tomorrow to schedule an appointment.  Secondary Diagnosis:	Bronchiectasis with acute exacerbation  Goal:	To feel better  Assessment and plan of treatment:	You were followed by the Pulmonology team (Dr. Cornejo) during your stay.  You will continue with oral steroids for 4 more days.  Please see Dr. Cornejo in 1 week. His office number and location are listed below.  Please call to schedule an appointment.  Continue taking your nebulizers.  Bring a copy of your discharge paperwork to all of your follow up appointments. Principal Discharge DX:	CAD (coronary artery disease)  Goal:	To recover from surgery  Assessment and plan of treatment:	-Please follow up with Dr. Cristobal on Tuesday 10/9/18 @ 9:00am.  The office is located at [facility], [address] [place] [address], 4th floor. Call us with any questions #[phone].    -Walk daily as tolerated (try for multiple times per day) and use your incentive spirometer every hour.    -No driving or strenuous activity/exercise for 6 weeks, or until cleared by your surgeon.    -Gently clean your incisions with anti-bacterial soap and water, pat dry.  You may leave them open to air.    -Call your doctor if you have shortness of breath, chest pain not relieved by pain medication, dizziness, fever >101.5, or increased redness or drainage from incisions.  Secondary Diagnosis:	Diabetes  Goal:	Controlled blood sugars  Assessment and plan of treatment:	Please continue to take your insulin, however the doses are DIFFERENT from your home doses prior to the surgery.  Continue with the doses listed in the discharge medication list and please follow up with the endocrine team in 1 week.  The office number is #[phone].  You can see Dr. Lyn, or any of the nurse practitioners in the office.  Please call tomorrow to schedule an appointment.  Secondary Diagnosis:	Bronchiectasis with acute exacerbation  Goal:	To feel better  Assessment and plan of treatment:	You were followed by the Pulmonology team (Dr. Cornejo) during your stay.  You will continue with oral steroids for 4 more days.  Please see Dr. Cornejo in 1 week. His office number and location are listed below.  Please call to schedule an appointment.  Continue taking your nebulizers.  Bring a copy of your discharge paperwork to all of your follow up appointments.  Assessment and plan of treatment:	Please follow up with Dr. Corrales in 3-4 weeks for staged PCI (stenting of coronary artery).  Office number and location listed below, however the following is the [facility] location--> [address], [place] [address].  Phone #[phone] Principal Discharge DX:	CAD (coronary artery disease)  Goal:	To recover from surgery  Assessment and plan of treatment:	-Please follow up with Dr. Cristobal on Tuesday 10/9/18 @ 10:00am.  You will have a CXR prior to your office visit.  Radiology department is located on the 3rd floor of the main building.      The cardiac surgery office is located at [facility], [address] [place] [address], 4th floor. Call us with any questions #[phone].    -Walk daily as tolerated (try for multiple times per day) and use your incentive spirometer every hour.    -No driving or strenuous activity/exercise for 6 weeks, or until cleared by your surgeon.    -Gently clean your incisions with anti-bacterial soap and water, pat dry.  You may leave them open to air.    -Call your doctor if you have shortness of breath, chest pain not relieved by pain medication, dizziness, fever >101.5, or increased redness or drainage from incisions.  Secondary Diagnosis:	Diabetes  Goal:	Controlled blood sugars  Assessment and plan of treatment:	Please continue to take your insulin, however the doses are DIFFERENT from your home doses prior to the surgery.  Continue with the doses listed in the discharge medication list and please follow up with the endocrine team in 1 week.  The office number is #[phone].  You can see Dr. Lyn, or any of the nurse practitioners in the office.  Please call tomorrow to schedule an appointment.  Secondary Diagnosis:	Bronchiectasis with acute exacerbation  Goal:	To feel better  Assessment and plan of treatment:	You were followed by the Pulmonology team (Dr. Cornejo) during your stay.  You will continue with oral steroids for 4 more days.  Please see Dr. Cornejo in 1 week. His office number and location are listed below.  Please call to schedule an appointment.  Continue taking your nebulizers.  Bring a copy of your discharge paperwork to all of your follow up appointments.  Assessment and plan of treatment:	Please follow up with Dr. Corrales in 3-4 weeks for staged PCI (stenting of coronary artery).  Office number and location listed below, however the following is the [facility] location--> [address] [address], [place] [address].  Phone #[phone]

## 2018-10-04 NOTE — DISCHARGE NOTE ADULT - PATIENT PORTAL LINK FT
You can access the [facility] Patient Portal, offered by [facility], by registering with the following website: [email or web address]

## 2018-10-04 NOTE — DISCHARGE NOTE ADULT - PROVIDER TOKENS
TOKEN:'[id number]',TOKEN:'[id number]' TOKEN:'[id number]:'[id number]'

## 2018-10-04 NOTE — PROGRESS NOTE ADULT - SUBJECTIVE AND OBJECTIVE BOX
Patient discussed on morning rounds with Dr. Cristobal    Operation / Date: 10/1/18 MIDCABx1 (LIMA-LAD) Pre-op EF 50%, intra-op EF 25%      SUBJECTIVE ASSESSMENT:  Patient feels "much better" today.  His breathing is "good" and he walked "2 or 3 times" yesterday.  He has been using his IS, although still only pulling ~500mL.  He is very eager to go home today.  He has a PO appetite and is eating well.  Denies chest pain, SOB, dizziness, N/V/D, fever/chills.     Vital Signs Last 24 Hrs  T(C): 36.4 (04 Oct 2018 10:14), Max: 37.1 (03 Oct 2018 13:45)  T(F): 97.5 (04 Oct 2018 10:14), Max: 98.8 (03 Oct 2018 13:45)  HR: 102 (04 Oct 2018 08:53) (82 - 114)  BP: 141/71 (04 Oct 2018 08:45) (127/80 - 186/103)  BP(mean): 96 (04 Oct 2018 08:45) (96 - 150)  RR: 26 (04 Oct 2018 08:53) (21 - 34)  SpO2: 97% (04 Oct 2018 08:53) (84% - 100%)  I&O's Detail    03 Oct 2018 07:01  -  04 Oct 2018 07:00  --------------------------------------------------------  IN:    IV PiggyBack: 900 mL    Oral Fluid: 590 mL    sodium chloride 0.9%: 10 mL  Total IN: 1500 mL    OUT:    Chest Tube: 5 mL    Indwelling Catheter - Urethral: 2675 mL  Total OUT: 2680 mL    Total NET: -1180 mL      04 Oct 2018 07:01  -  04 Oct 2018 11:04  --------------------------------------------------------  IN:    IV PiggyBack: 25 mL    Oral Fluid: 420 mL  Total IN: 445 mL    OUT:    Indwelling Catheter - Urethral: 200 mL  Total OUT: 200 mL    Total NET: 245 mL      CHEST TUBE:  No  FRANKY DRAIN:  No  EPICARDIAL WIRES: No  TIE DOWNS: Yes  KRISHNAMURTHY: no    PHYSICAL EXAM:    GEN: NAD, sitting up in chair, looks much better compared to yesterday.  Breathing looks calm and comfortable, and he overall looks brighter.  He is smiling and just ate breakfast.  Psych: Mood appropriate  Neuro: A&Ox3.  No focal deficits.  Moving all extremities.   HEENT: No obvious abnormalities  CV: S1S2, regular, no murmurs appreciated.  No carotid bruits.  No JVD  Lungs: B/L diffuse rhonchi.  No wheezing appreciated.  No rales heard.   ABD: Soft, non-tender, non-distended.  +Bowel sounds  EXT: Warm and well perfused.  No peripheral edema noted. ++B/L skin rash 2/2 psoriasis on his legs (thighs and shins).  Trace-mild peripheral edema, non-pitting.    Musculoskeletal: Moving all extremities with normal ROM, no joint swelling  PV: Pedal pulses palpable  Incision Sites: Left thoracotomy healing well, open to air, no drainage.  Chest tube site clean and dry. Dressing in place.     LABS:                        13.8   15.4  )-----------( 285      ( 04 Oct 2018 06:09 )             42.7       COUMADIN: No    PT/INR - ( 04 Oct 2018 06:09 )   PT: 12.8 sec;   INR: 1.15          PTT - ( 04 Oct 2018 06:09 )  PTT:29.4 sec    10-04    133<L>  |  94<L>  |  15  ----------------------------<  236<H>  4.6   |  28  |  0.77    Ca    9.6      04 Oct 2018 06:09  Phos  2.6     10-03  Mg     1.8     10-04    TPro  7.8  /  Alb  4.1  /  TBili  0.6  /  DBili  x   /  AST  22  /  ALT  21  /  AlkPhos  62  10-03      MEDICATIONS  (STANDING):  acetylcysteine 20% Inhalation 4 milliLiter(s) Inhalation every 12 hours  aspirin enteric coated 81 milliGRAM(s) Oral daily  atorvastatin 80 milliGRAM(s) Oral at bedtime  buDESOnide 160 MICROgram(s)/formoterol 4.5 MICROgram(s) Inhaler 2 Puff(s) Inhalation two times a day  chlorhexidine 2% Cloths 1 Application(s) Topical daily  clopidogrel Tablet 75 milliGRAM(s) Oral daily  dextrose 50% Injectable 50 milliLiter(s) IV Push every 15 minutes  dextrose 50% Injectable 25 milliLiter(s) IV Push every 15 minutes  dextrose 50% Injectable 12.5 Gram(s) IV Push once  dextrose 50% Injectable 25 Gram(s) IV Push once  dextrose 50% Injectable 25 Gram(s) IV Push once  docusate sodium 100 milliGRAM(s) Oral three times a day  heparin  Injectable 5000 Unit(s) SubCutaneous every 8 hours  influenza   Vaccine 0.5 milliLiter(s) IntraMuscular once  insulin glargine Injectable (LANTUS) 35 Unit(s) SubCutaneous at bedtime  insulin lispro (HumaLOG) corrective regimen sliding scale   SubCutaneous Before meals and at bedtime  insulin lispro Injectable (HumaLOG) 12 Unit(s) SubCutaneous three times a day before meals  levalbuterol Inhalation 0.63 milliGRAM(s) Inhalation every 4 hours  metoprolol tartrate 25 milliGRAM(s) Oral every 6 hours  pantoprazole    Tablet 40 milliGRAM(s) Oral before breakfast  piperacillin/tazobactam IVPB. 3.375 Gram(s) IV Intermittent every 6 hours  polyethylene glycol 3350 17 Gram(s) Oral once  predniSONE   Tablet 40 milliGRAM(s) Oral daily  senna 2 Tablet(s) Oral at bedtime  sodium chloride 0.9% lock flush 3 milliLiter(s) IV Push every 8 hours  vancomycin  IVPB 750 milliGRAM(s) IV Intermittent every 12 hours    MEDICATIONS  (PRN):  acetaminophen   Tablet .. 650 milliGRAM(s) Oral every 6 hours PRN Mild Pain (1 - 3)  dextrose 40% Gel 15 Gram(s) Oral once PRN Blood Glucose LESS THAN 70 milliGRAM(s)/deciliter  glucagon  Injectable 1 milliGRAM(s) IntraMuscular once PRN Glucose LESS THAN 70 milligrams/deciliter  ketorolac   Injectable 30 milliGRAM(s) IV Push every 6 hours PRN Severe Pain (7 - 10)  oxyCODONE    5 mG/acetaminophen 325 mG 1 Tablet(s) Oral every 4 hours PRN Moderate Pain (4 - 6)  oxyCODONE    5 mG/acetaminophen 325 mG 2 Tablet(s) Oral every 6 hours PRN Severe Pain (7 - 10)        RADIOLOGY & ADDITIONAL TESTS:  < from: Xray Chest 1 View- PORTABLE-Routine (10.04.18 @ 06:19) >  IMPRESSION:    Support Devices: Stable  Heart: Cardiomegaly  Mediastinum:  Unremarkable  Lungs/Airways: Upper lobe fibrocystic changes stable. No pneumothorax.  Bones/Soft tissues: Unremarkable    < end of copied text > Patient discussed on morning rounds with Dr. Cristobal    Operation / Date: 10/1/18 MIDCABx1 (LIMA-LAD) Pre-op EF 50%, intra-op EF 25%      SUBJECTIVE ASSESSMENT:  Patient feels "much better" today.  His breathing is "good" and he walked "2 or 3 times" yesterday.  He has been using his IS, although still only pulling ~500mL.  He is very eager to go home today.  He has a PO appetite and is eating well.  Denies chest pain, SOB, dizziness, N/V/D, fever/chills.     Hospital course: Patient is a 52 y/o male with strong family h/o of CAD/MI and PMHx of COPD, bronchiectasis, h/o TB/MAC, HTN, DM and HTN who was initially had an abnormal stress test on 9/18/18 and was referred to [facility] for cardiac cath.  The cath on 9/27/18 revealing significant 3VCAD (ostial LAD 75%, prox LAD 40%, prox ramus 70%, prox RCA 20% stenosis, mid RCA 95%).  PCI was unsuccessful due to heavy calcification at the RCA ostium, EF 45%.  Pt was transferred to [facility] by Dr. Corrales.  Upon assessment, he was deemed a candidate for hybrid MIDCAB/PCI.      On 10/1/18 he underwent a MIDCABx1 (LIMA-LAD) EF 25%.  POD#1 had post-op afib, treated with amiodarone, back to NSR.  Leno drain removed, transferred to mini-ICU.  TLC/krishnamurthy remained.  In the evening, arterial line placed back in for desaturation.  ABG stable.  Empiric abx started per Dr. Clemens for tracheobronchitis, as well as IV steroids for his lungs.  POD#2 O2 saturation improved, arterial line removed.  Had aggressive pulmonary toilet and on POD#3 his respiratory status was much improved.  His O2 was weaned, CXR remained stable.  Dr. Cornejo consulted and felt that his respiratory symptoms were due to a "flare-up" of his chronic bronchiectasis secondary to anesthesia/surgery.  He recommends continued PO steroids for a few more days, and taking off his antibiotics as they were only empiric and he is improving.  Central line and krishnamurthy removed this am.  Passed trial of void. Hemodynamically stable overall.      He is ambulating more and more each day, getting better with his IS, and stable for D/C home per Dr. Cristobal. Patient will F/U with Dr. Corrales in 4 weeks for staged PCI.     Vital Signs Last 24 Hrs  T(C): 36.4 (04 Oct 2018 10:14), Max: 37.1 (03 Oct 2018 13:45)  T(F): 97.5 (04 Oct 2018 10:14), Max: 98.8 (03 Oct 2018 13:45)  HR: 102 (04 Oct 2018 08:53) (82 - 114)  BP: 141/71 (04 Oct 2018 08:45) (127/80 - 186/103)  BP(mean): 96 (04 Oct 2018 08:45) (96 - 150)  RR: 26 (04 Oct 2018 08:53) (21 - 34)  SpO2: 97% (04 Oct 2018 08:53) (84% - 100%)  I&O's Detail    03 Oct 2018 07:01  -  04 Oct 2018 07:00  --------------------------------------------------------  IN:    IV PiggyBack: 900 mL    Oral Fluid: 590 mL    sodium chloride 0.9%: 10 mL  Total IN: 1500 mL    OUT:    Chest Tube: 5 mL    Indwelling Catheter - Urethral: 2675 mL  Total OUT: 2680 mL    Total NET: -1180 mL      04 Oct 2018 07:01  -  04 Oct 2018 11:04  --------------------------------------------------------  IN:    IV PiggyBack: 25 mL    Oral Fluid: 420 mL  Total IN: 445 mL    OUT:    Indwelling Catheter - Urethral: 200 mL  Total OUT: 200 mL    Total NET: 245 mL      CHEST TUBE:  No  LENO DRAIN:  No  EPICARDIAL WIRES: No  TIE DOWNS: Yes  KRISHNAMURTHY: no    PHYSICAL EXAM:    GEN: NAD, sitting up in chair, looks much better compared to yesterday.  Breathing looks calm and comfortable, and he overall looks brighter.  He is smiling and just ate breakfast.  Psych: Mood appropriate  Neuro: A&Ox3.  No focal deficits.  Moving all extremities.   HEENT: No obvious abnormalities  CV: S1S2, regular, no murmurs appreciated.  No carotid bruits.  No JVD  Lungs: B/L diffuse rhonchi.  No wheezing appreciated.  No rales heard.   ABD: Soft, non-tender, non-distended.  +Bowel sounds  EXT: Warm and well perfused.  No peripheral edema noted. ++B/L skin rash 2/2 psoriasis on his legs (thighs and shins).  Trace-mild peripheral edema, non-pitting.    Musculoskeletal: Moving all extremities with normal ROM, no joint swelling  PV: Pedal pulses palpable  Incision Sites: Left thoracotomy healing well, open to air, no drainage.  Chest tube site clean and dry. Dressing in place.     LABS:                        13.8   15.4  )-----------( 285      ( 04 Oct 2018 06:09 )             42.7       COUMADIN: No    PT/INR - ( 04 Oct 2018 06:09 )   PT: 12.8 sec;   INR: 1.15          PTT - ( 04 Oct 2018 06:09 )  PTT:29.4 sec    10-04    133<L>  |  94<L>  |  15  ----------------------------<  236<H>  4.6   |  28  |  0.77    Ca    9.6      04 Oct 2018 06:09  Phos  2.6     10-03  Mg     1.8     10-04    TPro  7.8  /  Alb  4.1  /  TBili  0.6  /  DBili  x   /  AST  22  /  ALT  21  /  AlkPhos  62  10-03      MEDICATIONS  (STANDING):  acetylcysteine 20% Inhalation 4 milliLiter(s) Inhalation every 12 hours  aspirin enteric coated 81 milliGRAM(s) Oral daily  atorvastatin 80 milliGRAM(s) Oral at bedtime  buDESOnide 160 MICROgram(s)/formoterol 4.5 MICROgram(s) Inhaler 2 Puff(s) Inhalation two times a day  chlorhexidine 2% Cloths 1 Application(s) Topical daily  clopidogrel Tablet 75 milliGRAM(s) Oral daily  dextrose 50% Injectable 50 milliLiter(s) IV Push every 15 minutes  dextrose 50% Injectable 25 milliLiter(s) IV Push every 15 minutes  dextrose 50% Injectable 12.5 Gram(s) IV Push once  dextrose 50% Injectable 25 Gram(s) IV Push once  dextrose 50% Injectable 25 Gram(s) IV Push once  docusate sodium 100 milliGRAM(s) Oral three times a day  heparin  Injectable 5000 Unit(s) SubCutaneous every 8 hours  influenza   Vaccine 0.5 milliLiter(s) IntraMuscular once  insulin glargine Injectable (LANTUS) 35 Unit(s) SubCutaneous at bedtime  insulin lispro (HumaLOG) corrective regimen sliding scale   SubCutaneous Before meals and at bedtime  insulin lispro Injectable (HumaLOG) 12 Unit(s) SubCutaneous three times a day before meals  levalbuterol Inhalation 0.63 milliGRAM(s) Inhalation every 4 hours  metoprolol tartrate 25 milliGRAM(s) Oral every 6 hours  pantoprazole    Tablet 40 milliGRAM(s) Oral before breakfast  piperacillin/tazobactam IVPB. 3.375 Gram(s) IV Intermittent every 6 hours  polyethylene glycol 3350 17 Gram(s) Oral once  predniSONE   Tablet 40 milliGRAM(s) Oral daily  senna 2 Tablet(s) Oral at bedtime  sodium chloride 0.9% lock flush 3 milliLiter(s) IV Push every 8 hours  vancomycin  IVPB 750 milliGRAM(s) IV Intermittent every 12 hours    MEDICATIONS  (PRN):  acetaminophen   Tablet .. 650 milliGRAM(s) Oral every 6 hours PRN Mild Pain (1 - 3)  dextrose 40% Gel 15 Gram(s) Oral once PRN Blood Glucose LESS THAN 70 milliGRAM(s)/deciliter  glucagon  Injectable 1 milliGRAM(s) IntraMuscular once PRN Glucose LESS THAN 70 milligrams/deciliter  ketorolac   Injectable 30 milliGRAM(s) IV Push every 6 hours PRN Severe Pain (7 - 10)  oxyCODONE    5 mG/acetaminophen 325 mG 1 Tablet(s) Oral every 4 hours PRN Moderate Pain (4 - 6)  oxyCODONE    5 mG/acetaminophen 325 mG 2 Tablet(s) Oral every 6 hours PRN Severe Pain (7 - 10)        RADIOLOGY & ADDITIONAL TESTS:  < from: Xray Chest 1 View- PORTABLE-Routine (10.04.18 @ 06:19) >  IMPRESSION:    Support Devices: Stable  Heart: Cardiomegaly  Mediastinum:  Unremarkable  Lungs/Airways: Upper lobe fibrocystic changes stable. No pneumothorax.  Bones/Soft tissues: Unremarkable    < end of copied text >

## 2018-10-04 NOTE — DISCHARGE NOTE ADULT - CARE PROVIDER_API CALL
Rudy Cristobal), Cardiovascular Surgery  [address]  [address]th [place] [address]  Phone: [phone]  Fax: [phone]    Alice Cornejo), Critical Care Medicine; Pulmonary Disease  [address]  [address]  [place] [address]  Phone: [phone]  Fax: [phone] Rudy Cristobal), Cardiovascular Surgery  [address]  [address]th [place] [address]  Phone: [phone]  Fax: [phone]    Alice Cornejo), Critical Care Medicine; Pulmonary Disease  [address]  [address]  [place] [address]  Phone: [phone]  Fax: [phone]    Kannan Corrales), Cardiovascular Disease; Interventional Cardiology  [address]  [address] [place] [address]  Phone: [phone]  Fax: [phone]

## 2018-10-04 NOTE — PROGRESS NOTE ADULT - SUBJECTIVE AND OBJECTIVE BOX
INTERVAL HPI/OVERNIGHT EVENTS:    54 y/o M with strong FamHx of CAD/MI (4 grandparents  from [place], father  from [place], sister s/p CABG) and PMHx of COPD, Bronchiectasis, h/o MAC, HTN, DM, HTN, with abnormal stress test, who is s/p diagnostic cardiac cath at  on 18 revealing significant 3VCAD with unsuccessful PCI due to heavy calcification at the RCA ostium now s/p midcab, now with hyperglycemia. Patient on prednisone 40mg PO once daily. Has good appetite but states his cough and mucous have been worse since admission to the hospital.     FSG & Insulin received:  Yesterday:  pre-dinner fs  nutritional lispro 10 units + 6 units lispro SS  bedtime fs  lantus 30 units + 4  units lispro SS    Today:  pre-breakfast fs  nutritional lispro 10 units + 4  units lispro SS   pre-lunch fs  nutritional lispro 12 units+ 6 units lispro SS    Pt reports the following symptoms:    CONSTITUTIONAL:  Negative fever or chills, feels well, good appetite  EYES:  Negative  blurry vision or double vision  CARDIOVASCULAR:  Negative for chest pain or palpitations  RESPIRATORY:  +cough, wheezing, or SOB   GASTROINTESTINAL:  Negative for nausea, vomiting, diarrhea, constipation, or abdominal pain  GENITOURINARY:  Negative frequency, urgency or dysuria  NEUROLOGIC:  No headache, confusion, dizziness, lightheadedness    MEDICATIONS  (STANDING):  acetylcysteine 20% Inhalation 4 milliLiter(s) Inhalation every 12 hours  aspirin enteric coated 81 milliGRAM(s) Oral daily  atorvastatin 80 milliGRAM(s) Oral at bedtime  buDESOnide 160 MICROgram(s)/formoterol 4.5 MICROgram(s) Inhaler 2 Puff(s) Inhalation two times a day  chlorhexidine 2% Cloths 1 Application(s) Topical daily  clopidogrel Tablet 75 milliGRAM(s) Oral daily  docusate sodium 100 milliGRAM(s) Oral three times a day  heparin  Injectable 5000 Unit(s) SubCutaneous every 8 hours  influenza   Vaccine 0.5 milliLiter(s) IntraMuscular once  insulin glargine Injectable (LANTUS) 35 Unit(s) SubCutaneous at bedtime  insulin lispro (HumaLOG) corrective regimen sliding scale   SubCutaneous Before meals and at bedtime  insulin lispro Injectable (HumaLOG) 12 Unit(s) SubCutaneous three times a day before meals  levalbuterol Inhalation 0.63 milliGRAM(s) Inhalation every 4 hours  metoprolol tartrate 25 milliGRAM(s) Oral every 6 hours  pantoprazole    Tablet 40 milliGRAM(s) Oral before breakfast  polyethylene glycol 3350 17 Gram(s) Oral once  senna 2 Tablet(s) Oral at bedtime  sodium chloride 0.9% lock flush 3 milliLiter(s) IV Push every 8 hours    MEDICATIONS  (PRN):  acetaminophen   Tablet .. 650 milliGRAM(s) Oral every 6 hours PRN Mild Pain (1 - 3)  dextrose 40% Gel 15 Gram(s) Oral once PRN Blood Glucose LESS THAN 70 milliGRAM(s)/deciliter  glucagon  Injectable 1 milliGRAM(s) IntraMuscular once PRN Glucose LESS THAN 70 milligrams/deciliter  ketorolac   Injectable 30 milliGRAM(s) IV Push every 6 hours PRN Severe Pain (7 - 10)  oxyCODONE    5 mG/acetaminophen 325 mG 1 Tablet(s) Oral every 4 hours PRN Moderate Pain (4 - 6)  oxyCODONE    5 mG/acetaminophen 325 mG 2 Tablet(s) Oral every 6 hours PRN Severe Pain (7 - 10)      PHYSICAL EXAM  Vital Signs Last 24 Hrs  T(C): 36.4 (04 Oct 2018 10:14), Max: 36.6 (04 Oct 2018 04:44)  T(F): 97.5 (04 Oct 2018 10:14), Max: 97.8 (04 Oct 2018 04:44)  HR: 102 (04 Oct 2018 08:53) (82 - 112)  BP: 141/71 (04 Oct 2018 08:45) (127/80 - 167/88)  BP(mean): 96 (04 Oct 2018 08:45) (96 - 130)  RR: 26 (04 Oct 2018 08:53) (21 - 32)  SpO2: 97% (04 Oct 2018 08:53) (84% - 100%)    Constitutional: wn/wd in NAD.   HEENT: NCAT, MMM, OP clear, EOMI, no proptosis or lid retraction  Neck: no thyromegaly or palpable thyroid nodules   Respiratory: Coarse rhonchi, Trace bibasilar crackles, mild expiratory wheezing.   Cardiovascular: regular rhythm, normal S1 and S2, no audible murmurs, no peripheral edema  GI: soft, NT/ND, no masses/HSM appreciated.  Neurology: no tremors, DTR 2+  Skin: no visible rashes/lesions  Psychiatric: AAO x 3, normal affect/mood.    LABS:                        13.8   15.4  )-----------( 285      ( 04 Oct 2018 06:09 )             42.7     10-04    133<L>  |  94<L>  |  15  ----------------------------<  236<H>  4.6   |  28  |  0.77    Ca    9.6      04 Oct 2018 06:09  Phos  2.6     10-03  Mg     1.8     10-    TPro  7.8  /  Alb  4.1  /  TBili  0.6  /  DBili  x   /  AST  22  /  ALT  21  /  AlkPhos  62  10-03    PT/INR - ( 04 Oct 2018 06:09 )   PT: 12.8 sec;   INR: 1.15          PTT - ( 04 Oct 2018 06:09 )  PTT:29.4 sec    Thyroid Stimulating Hormone, Serum: 1.914 uIU/mL ( @ 22:55)      HbA1C: 8.8 % ( @ 06:14)    CAPILLARY BLOOD GLUCOSE  POCT Blood Glucose.: 282 mg/dL (04 Oct 2018 11:50)  POCT Blood Glucose.: 218 mg/dL (04 Oct 2018 06:31)  POCT Blood Glucose.: 248 mg/dL (03 Oct 2018 22:14)  POCT Blood Glucose.: 263 mg/dL (03 Oct 2018 16:43)    A/P: 54 y/o M with s/p midcab, now with hyperglycemia and poorly controlled diabetes, on prednisone.    1.  DM--type 2, uncontrolled, complicated  Please increase lantus to 35 units at night, will observe as sugars will likely start to decrease given the decrease in steroids.   Please increase lispro to 12 units before each meal.    Please continue lispro moderate dose sliding scale four times daily with meals and at bedtime  Pt's fingerstick glucose goal is 100-150.  Will continue to monitor     Pt can follow up at discharge with [facility] by calling  to make an appointment.   Will discuss case with Dr. Phelan  and update primary team INTERVAL HPI/OVERNIGHT EVENTS:    52 y/o M with strong FamHx of CAD/MI (4 grandparents  from [place], father  from [place], sister s/p CABG) and PMHx of COPD, Bronchiectasis, h/o MAC, HTN, DM, HTN, with abnormal stress test, who is s/p diagnostic cardiac cath at  on 18 revealing significant 3VCAD with unsuccessful PCI due to heavy calcification at the RCA ostium now s/p midcab, now with hyperglycemia. Patient on prednisone 40mg PO once daily. Has good appetite but states his cough and mucous have been worse since admission to the hospital.     FSG & Insulin received:  Yesterday:  pre-dinner fs  nutritional lispro 10 units + 6 units lispro SS  bedtime fs  lantus 30 units + 4  units lispro SS    Today:  pre-breakfast fs  nutritional lispro 10 units + 4  units lispro SS   pre-lunch fs  nutritional lispro 12 units+ 6 units lispro SS    Pt reports the following symptoms:    CONSTITUTIONAL:  Negative fever or chills, feels well, good appetite  EYES:  Negative  blurry vision or double vision  CARDIOVASCULAR:  Negative for chest pain or palpitations  RESPIRATORY:  +cough, wheezing, or SOB   GASTROINTESTINAL:  Negative for nausea, vomiting, diarrhea, constipation, or abdominal pain  GENITOURINARY:  Negative frequency, urgency or dysuria  NEUROLOGIC:  No headache, confusion, dizziness, lightheadedness    MEDICATIONS  (STANDING):  acetylcysteine 20% Inhalation 4 milliLiter(s) Inhalation every 12 hours  aspirin enteric coated 81 milliGRAM(s) Oral daily  atorvastatin 80 milliGRAM(s) Oral at bedtime  buDESOnide 160 MICROgram(s)/formoterol 4.5 MICROgram(s) Inhaler 2 Puff(s) Inhalation two times a day  chlorhexidine 2% Cloths 1 Application(s) Topical daily  clopidogrel Tablet 75 milliGRAM(s) Oral daily  docusate sodium 100 milliGRAM(s) Oral three times a day  heparin  Injectable 5000 Unit(s) SubCutaneous every 8 hours  influenza   Vaccine 0.5 milliLiter(s) IntraMuscular once  insulin glargine Injectable (LANTUS) 35 Unit(s) SubCutaneous at bedtime  insulin lispro (HumaLOG) corrective regimen sliding scale   SubCutaneous Before meals and at bedtime  insulin lispro Injectable (HumaLOG) 12 Unit(s) SubCutaneous three times a day before meals  levalbuterol Inhalation 0.63 milliGRAM(s) Inhalation every 4 hours  metoprolol tartrate 25 milliGRAM(s) Oral every 6 hours  pantoprazole    Tablet 40 milliGRAM(s) Oral before breakfast  polyethylene glycol 3350 17 Gram(s) Oral once  senna 2 Tablet(s) Oral at bedtime  sodium chloride 0.9% lock flush 3 milliLiter(s) IV Push every 8 hours    MEDICATIONS  (PRN):  acetaminophen   Tablet .. 650 milliGRAM(s) Oral every 6 hours PRN Mild Pain (1 - 3)  dextrose 40% Gel 15 Gram(s) Oral once PRN Blood Glucose LESS THAN 70 milliGRAM(s)/deciliter  glucagon  Injectable 1 milliGRAM(s) IntraMuscular once PRN Glucose LESS THAN 70 milligrams/deciliter  ketorolac   Injectable 30 milliGRAM(s) IV Push every 6 hours PRN Severe Pain (7 - 10)  oxyCODONE    5 mG/acetaminophen 325 mG 1 Tablet(s) Oral every 4 hours PRN Moderate Pain (4 - 6)  oxyCODONE    5 mG/acetaminophen 325 mG 2 Tablet(s) Oral every 6 hours PRN Severe Pain (7 - 10)      PHYSICAL EXAM  Vital Signs Last 24 Hrs  T(C): 36.4 (04 Oct 2018 10:14), Max: 36.6 (04 Oct 2018 04:44)  T(F): 97.5 (04 Oct 2018 10:14), Max: 97.8 (04 Oct 2018 04:44)  HR: 102 (04 Oct 2018 08:53) (82 - 112)  BP: 141/71 (04 Oct 2018 08:45) (127/80 - 167/88)  BP(mean): 96 (04 Oct 2018 08:45) (96 - 130)  RR: 26 (04 Oct 2018 08:53) (21 - 32)  SpO2: 97% (04 Oct 2018 08:53) (84% - 100%)    Constitutional: wn/wd in NAD.   HEENT: NCAT, MMM, OP clear, EOMI, no proptosis or lid retraction  Neck: no thyromegaly or palpable thyroid nodules   Respiratory: Coarse rhonchi, Trace bibasilar crackles, mild expiratory wheezing.   Cardiovascular: regular rhythm, normal S1 and S2, no audible murmurs, no peripheral edema  GI: soft, NT/ND, no masses/HSM appreciated.  Neurology: no tremors, DTR 2+  Skin: no visible rashes/lesions  Psychiatric: AAO x 3, normal affect/mood.    LABS:                        13.8   15.4  )-----------( 285      ( 04 Oct 2018 06:09 )             42.7     10-04    133<L>  |  94<L>  |  15  ----------------------------<  236<H>  4.6   |  28  |  0.77    Ca    9.6      04 Oct 2018 06:09  Phos  2.6     10-03  Mg     1.8     10-    TPro  7.8  /  Alb  4.1  /  TBili  0.6  /  DBili  x   /  AST  22  /  ALT  21  /  AlkPhos  62  10-03    PT/INR - ( 04 Oct 2018 06:09 )   PT: 12.8 sec;   INR: 1.15          PTT - ( 04 Oct 2018 06:09 )  PTT:29.4 sec    Thyroid Stimulating Hormone, Serum: 1.914 uIU/mL ( @ 22:55)      HbA1C: 8.8 % ( @ 06:14)    CAPILLARY BLOOD GLUCOSE  POCT Blood Glucose.: 282 mg/dL (04 Oct 2018 11:50)  POCT Blood Glucose.: 218 mg/dL (04 Oct 2018 06:31)  POCT Blood Glucose.: 248 mg/dL (03 Oct 2018 22:14)  POCT Blood Glucose.: 263 mg/dL (03 Oct 2018 16:43)    A/P: 52 y/o M with s/p midcab, now with hyperglycemia and poorly controlled diabetes, on prednisone.    1.  DM--type 2, uncontrolled, complicated  Please increase lantus to 35 units at night, will observe as sugars will likely start to decrease given the decrease in steroids.   Please increase lispro to 12 units before each meal.    Please continue lispro moderate dose sliding scale four times daily with meals and at bedtime  Pt's fingerstick glucose goal is 100-150.  Will continue to monitor     Pt can follow up at discharge with [facility] by calling  to make an appointment.   Will discuss case with Dr. Phelan  and update primary team      Addendum:   Patient is being discharged today. Will send home on Glargine 35 units HS and humalog 12 units pre meal TID as patient continues to be on prednisone taper. Patient will follow up at [address] [place] [address] next week. Advised to hold glipizide, januvia and metformin until he is seen in clinic next week. IF AM FS is less then 200, advised to decrease lantus to 30 units, and if pre meal FS is less than 150, advised to decrease lispro by 2 units.

## 2018-10-04 NOTE — DISCHARGE NOTE ADULT - MEDICATION SUMMARY - MEDICATIONS TO TAKE
I will START or STAY ON the medications listed below when I get home from the hospital:    predniSONE 20 mg oral tablet  -- 2 tab(s) by mouth once a day x 4 more days then stop  -- Indication: For lungs    acetaminophen 325 mg oral tablet  -- 2 tab(s) by mouth every 6 hours, As needed, Mild Pain (1 - 3)  -- Indication: For mild pain    oxyCODONE-acetaminophen 5 mg-325 mg oral tablet  -- 1 tab(s) by mouth every 6 hours, As Needed -Severe Pain (7 - 10) MDD:4 tabs  -- Indication: For severe pain    aspirin 81 mg oral delayed release tablet  -- 1 tab(s) by mouth once a day  -- Indication: For Heart disease    Basaglar KwikPen 100 units/mL subcutaneous solution  -- 35 unit(s) subcutaneous once a day   -- Indication: For Diabetes    HumuLIN R 100 units/mL injectable solution  -- 12 unit(s) injectable 3 times a day (with meals)   -- Indication: For Diabetes    atorvastatin 80 mg oral tablet  -- 1 tab(s) by mouth once a day (at bedtime)  -- Indication: For Cholesterol    clopidogrel 75 mg oral tablet  -- 1 tab(s) by mouth once a day  -- Indication: For Heart disease    metoprolol tartrate 50 mg oral tablet  -- 1 tab(s) by mouth every 12 hours   -- It is very important that you take or use this exactly as directed.  Do not skip doses or discontinue unless directed by your doctor.  May cause drowsiness.  Alcohol may intensify this effect.  Use care when operating dangerous machinery.  Some non-prescription drugs may aggravate your condition.  Read all labels carefully.  If a warning appears, check with your doctor before taking.  Take with food or milk.  This drug may impair the ability to drive or operate machinery.  Use care until you become familiar with its effects.    -- Indication: For Blood pressure and heart rate control    Advair  mcg-21 mcg/inh inhalation aerosol  -- 2 puff(s) inhaled 2 times a day  -- Indication: For lungs    Proventil 2.5 mg/3 mL (0.083%) inhalation solution  -- 3 milliliter(s) inhaled every 6 hours, As Needed  -- Indication: For lungs    Jublia 10% topical solution  -- Apply on skin to affected area once a day  -- Indication: For Psoriasis    docusate sodium 100 mg oral capsule  -- 1 cap(s) by mouth 3 times a day, As Needed -for constipation  ; Hold for loose or frequent stool.   -- Indication: For stool softener    pantoprazole 40 mg oral delayed release tablet  -- 1 tab(s) by mouth once a day (before a meal)  -- Indication: For stomach ulcer prevention    Vitamin B12 100 mcg oral tablet  -- 1 tab(s) by mouth once a day  -- Indication: For vitamin supplement    Vitamin D2 50,000 intl units (1.25 mg) oral capsule  -- 1 cap(s) by mouth once a week  -- Indication: For vitamin I will START or STAY ON the medications listed below when I get home from the hospital:    predniSONE 20 mg oral tablet  -- 2 tab(s) by mouth once a day x 4 more days then stop  -- Indication: For lungs    acetaminophen 325 mg oral tablet  -- 2 tab(s) by mouth every 6 hours, As needed, Mild Pain (1 - 3)  -- Indication: For mild pain    oxyCODONE-acetaminophen 5 mg-325 mg oral tablet  -- 1 tab(s) by mouth every 6 hours, As Needed -Severe Pain (7 - 10) MDD:4 tabs  -- Indication: For severe pain    aspirin 81 mg oral delayed release tablet  -- 1 tab(s) by mouth once a day  -- Indication: For Heart disease    HumuLIN R 100 units/mL injectable solution  -- 12 unit(s) injectable 3 times a day (with meals)   -- Indication: For Diabetes    Basaglar KwikPen 100 units/mL subcutaneous solution  -- 35 unit(s) subcutaneous once a day   -- Indication: For Diabetes    atorvastatin 80 mg oral tablet  -- 1 tab(s) by mouth once a day (at bedtime)  -- Indication: For Cholesterol    clopidogrel 75 mg oral tablet  -- 1 tab(s) by mouth once a day  -- Indication: For Heart disease    metoprolol tartrate 50 mg oral tablet  -- 1 tab(s) by mouth every 12 hours   -- It is very important that you take or use this exactly as directed.  Do not skip doses or discontinue unless directed by your doctor.  May cause drowsiness.  Alcohol may intensify this effect.  Use care when operating dangerous machinery.  Some non-prescription drugs may aggravate your condition.  Read all labels carefully.  If a warning appears, check with your doctor before taking.  Take with food or milk.  This drug may impair the ability to drive or operate machinery.  Use care until you become familiar with its effects.    -- Indication: For Blood pressure and heart rate control    Advair  mcg-21 mcg/inh inhalation aerosol  -- 2 puff(s) inhaled 2 times a day  -- Indication: For lungs    Proventil 2.5 mg/3 mL (0.083%) inhalation solution  -- 3 milliliter(s) inhaled every 6 hours, As Needed  -- Indication: For lungs    Jublia 10% topical solution  -- Apply on skin to affected area once a day  -- Indication: For Psoriasis    docusate sodium 100 mg oral capsule  -- 1 cap(s) by mouth 3 times a day, As Needed -for constipation  ; Hold for loose or frequent stool.   -- Indication: For stool softener    pantoprazole 40 mg oral delayed release tablet  -- 1 tab(s) by mouth once a day (before a meal)  -- Indication: For stomach ulcer prevention    Vitamin B12 100 mcg oral tablet  -- 1 tab(s) by mouth once a day  -- Indication: For vitamin supplement    Vitamin D2 50,000 intl units (1.25 mg) oral capsule  -- 1 cap(s) by mouth once a week  -- Indication: For vitamin I will START or STAY ON the medications listed below when I get home from the hospital:    predniSONE 20 mg oral tablet  -- 2 tab(s) by mouth once a day x 4 more days then stop  -- Indication: For lungs    acetaminophen 325 mg oral tablet  -- 2 tab(s) by mouth every 6 hours, As needed, Mild Pain (1 - 3)  -- Indication: For Pain    oxyCODONE-acetaminophen 5 mg-325 mg oral tablet  -- 1 tab(s) by mouth every 6 hours, As Needed -Severe Pain (7 - 10) MDD:4 tabs  -- Indication: For Pain    aspirin 81 mg oral delayed release tablet  -- 1 tab(s) by mouth once a day  -- Indication: For Heart disease    HumuLIN R 100 units/mL injectable solution  -- 12 unit(s) injectable 3 times a day (with meals)   -- Indication: For Diabetes    Basaglar KwikPen 100 units/mL subcutaneous solution  -- 35 unit(s) subcutaneous once a day   -- Indication: For Diabetes    atorvastatin 80 mg oral tablet  -- 1 tab(s) by mouth once a day (at bedtime)  -- Indication: For Cholesterol    clopidogrel 75 mg oral tablet  -- 1 tab(s) by mouth once a day  -- Indication: For Heart disease    metoprolol tartrate 50 mg oral tablet  -- 1 tab(s) by mouth every 12 hours   -- It is very important that you take or use this exactly as directed.  Do not skip doses or discontinue unless directed by your doctor.  May cause drowsiness.  Alcohol may intensify this effect.  Use care when operating dangerous machinery.  Some non-prescription drugs may aggravate your condition.  Read all labels carefully.  If a warning appears, check with your doctor before taking.  Take with food or milk.  This drug may impair the ability to drive or operate machinery.  Use care until you become familiar with its effects.    -- Indication: For Blood pressure and heart rate control    Advair  mcg-21 mcg/inh inhalation aerosol  -- 2 puff(s) inhaled 2 times a day  -- Indication: For lungs    Proventil 2.5 mg/3 mL (0.083%) inhalation solution  -- 3 milliliter(s) inhaled every 6 hours, As Needed  -- Indication: For lungs    Jublia 10% topical solution  -- Apply on skin to affected area once a day  -- Indication: For Psoriasis    docusate sodium 100 mg oral capsule  -- 1 cap(s) by mouth 3 times a day, As Needed -for constipation  ; Hold for loose or frequent stool.   -- Indication: For stool softener    pantoprazole 40 mg oral delayed release tablet  -- 1 tab(s) by mouth once a day (before a meal)  -- Indication: For stomach ulcer prevention    Vitamin B12 100 mcg oral tablet  -- 1 tab(s) by mouth once a day  -- Indication: For vitamin supplement    Vitamin D2 50,000 intl units (1.25 mg) oral capsule  -- 1 cap(s) by mouth once a week  -- Indication: For vitamin

## 2018-10-04 NOTE — PROGRESS NOTE ADULT - PROVIDER SPECIALTY LIST ADULT
CT Surgery
Cardiology
Critical Care
Endocrinology
Endocrinology
Intervent Cardiology
Intervent Cardiology
Critical Care
Intervent Cardiology

## 2018-10-04 NOTE — PROGRESS NOTE ADULT - ASSESSMENT
Patient is a 52 y/o male with strong family h/o of CAD/MI and PMHx of COPD, bronchiectasis, h/o TB/MAC, HTN, DM and HTN who was initially had an abnormal stress test on 9/18/18 and was referred to [facility] for cardiac cath.  The cath on 9/27/18 revealing significant 3VCAD (ostial LAD 75%, prox LAD 40%, prox ramus 70%, prox RCA 20% stenosis, mid RCA 95%).  PCI was unsuccessful due to heavy calcification at the RCA ostium, EF 45%.  Pt was transferred to [facility] by Dr. Corrales.  Upon assessment, he was deemed a candidate for hybrid MIDCAB/PCI.      On 10/1/18 he underwent a MIDCABx1 (LIMA-LAD) EF 25%.  POD#1 had post-op afib, treated with amiodarone, back to NSR.  Leno drain removed, transferred to mini-ICU.  TLC/vitale remained.  In the evening, arterial line placed back in for desaturation.  ABG stable.  Empiric abx started per Dr. Clemens for tracheobronchitis, as well as IV steroids for his lungs.  POD#2 O2 saturation improved, arterial line removed.  Had aggressive pulmonary toilet and on POD#3 his respiratory status was much improved.  His O2 was weaned, CXR remained stable.  Dr. Cornejo consulted and felt that his respiratory symptoms were due to a "flare-up" of his chronic bronchiectasis secondary to anesthesia/surgery.  He recommends continued PO steroids for a few more days, and taking off his antibiotics as they were only empiric and he is improving.  Central line and vitale removed this am.  Hemodynamically stable overall.

## 2018-10-04 NOTE — DISCHARGE NOTE ADULT - HOSPITAL COURSE
Patient is a 52 y/o male with strong family h/o of CAD/MI and PMHx of COPD, bronchiectasis, h/o TB/MAC, HTN, DM and HTN who was initially had an abnormal stress test on 9/18/18 and was referred to [facility] for cardiac cath.  The cath on 9/27/18 revealing significant 3VCAD (ostial LAD 75%, prox LAD 40%, prox ramus 70%, prox RCA 20% stenosis, mid RCA 95%).  PCI was unsuccessful due to heavy calcification at the RCA ostium, EF 45%.  Pt was transferred to [facility] by Dr. Corrales.  Upon assessment, he was deemed a candidate for hybrid MIDCAB/PCI.      On 10/1/18 he underwent a MIDCABx1 (LIMA-LAD) EF 25%.  POD#1 had post-op afib, treated with amiodarone, back to NSR.  Leno drain removed, transferred to mini-ICU.  TLC/vitale remained.  In the evening, arterial line placed back in for desaturation.  ABG stable.  Empiric abx started per Dr. Clemens for tracheobronchitis, as well as IV steroids for his lungs.  POD#2 O2 saturation improved, arterial line removed.  Had aggressive pulmonary toilet and on POD#3 his respiratory status was much improved.  His O2 was weaned, CXR remained stable.  Dr. Cornejo consulted and felt that his respiratory symptoms were due to a "flare-up" of his chronic bronchiectasis secondary to anesthesia/surgery.  He recommends continued PO steroids for a few more days, and taking off his antibiotics as they were only empiric and he is improving.  Central line and vitale removed this am.  Passed trial of void. Hemodynamically stable overall.      He is ambulating more and more each day, getting better with his IS, and stable for D/C home per Dr. Cristobal.

## 2018-10-04 NOTE — PROGRESS NOTE ADULT - REASON FOR ADMISSION
Transfer from  for staged PCI of RCA
CAD
Transfer from  for staged PCI of RCA
Transfer from  for staged PCI of RCA
CAD
Transfer from  for staged PCI of RCA
CAD
Transfer from  for staged PCI of RCA
Transfer from  for staged PCI of RCA

## 2018-10-05 PROBLEM — E78.5 HYPERLIPIDEMIA, UNSPECIFIED: Chronic | Status: ACTIVE | Noted: 2018-09-27

## 2018-10-05 PROBLEM — I10 ESSENTIAL (PRIMARY) HYPERTENSION: Chronic | Status: ACTIVE | Noted: 2018-09-27

## 2018-10-05 PROBLEM — J44.9 CHRONIC OBSTRUCTIVE PULMONARY DISEASE, UNSPECIFIED: Chronic | Status: ACTIVE | Noted: 2018-09-27

## 2018-10-05 PROBLEM — E11.9 TYPE 2 DIABETES MELLITUS WITHOUT COMPLICATIONS: Chronic | Status: ACTIVE | Noted: 2018-09-27

## 2018-10-05 PROBLEM — I25.10 ATHEROSCLEROTIC HEART DISEASE OF NATIVE CORONARY ARTERY WITHOUT ANGINA PECTORIS: Chronic | Status: ACTIVE | Noted: 2018-09-27

## 2018-10-06 LAB
-  AMPICILLIN/SULBACTAM: SIGNIFICANT CHANGE UP
-  AMPICILLIN: SIGNIFICANT CHANGE UP
-  AZITHROMYCIN: SIGNIFICANT CHANGE UP
-  CEFTRIAXONE: SIGNIFICANT CHANGE UP
-  CHLORAMPHENICOL: SIGNIFICANT CHANGE UP
-  CIPROFLOXACIN: SIGNIFICANT CHANGE UP
-  CLARITHROMYCIN: SIGNIFICANT CHANGE UP
-  LEVOFLOXACIN: SIGNIFICANT CHANGE UP
-  TRIMETHOPRIM/SULFAMETHOXAZOLE: SIGNIFICANT CHANGE UP
METHOD TYPE: SIGNIFICANT CHANGE UP

## 2018-10-07 LAB
CULTURE RESULTS: SIGNIFICANT CHANGE UP
ORGANISM # SPEC MICROSCOPIC CNT: SIGNIFICANT CHANGE UP
ORGANISM # SPEC MICROSCOPIC CNT: SIGNIFICANT CHANGE UP
SPECIMEN SOURCE: SIGNIFICANT CHANGE UP

## 2018-10-08 DIAGNOSIS — I45.10 UNSPECIFIED RIGHT BUNDLE-BRANCH BLOCK: ICD-10-CM

## 2018-10-08 DIAGNOSIS — E78.5 HYPERLIPIDEMIA, UNSPECIFIED: ICD-10-CM

## 2018-10-08 DIAGNOSIS — I25.10 ATHEROSCLEROTIC HEART DISEASE OF NATIVE CORONARY ARTERY WITHOUT ANGINA PECTORIS: ICD-10-CM

## 2018-10-08 DIAGNOSIS — I25.2 OLD MYOCARDIAL INFARCTION: ICD-10-CM

## 2018-10-08 DIAGNOSIS — Z86.11 PERSONAL HISTORY OF TUBERCULOSIS: ICD-10-CM

## 2018-10-08 DIAGNOSIS — I11.0 HYPERTENSIVE HEART DISEASE WITH HEART FAILURE: ICD-10-CM

## 2018-10-08 DIAGNOSIS — E11.65 TYPE 2 DIABETES MELLITUS WITH HYPERGLYCEMIA: ICD-10-CM

## 2018-10-08 DIAGNOSIS — Z79.82 LONG TERM (CURRENT) USE OF ASPIRIN: ICD-10-CM

## 2018-10-08 DIAGNOSIS — Z82.49 FAMILY HISTORY OF ISCHEMIC HEART DISEASE AND OTHER DISEASES OF THE CIRCULATORY SYSTEM: ICD-10-CM

## 2018-10-08 DIAGNOSIS — R94.39 ABNORMAL RESULT OF OTHER CARDIOVASCULAR FUNCTION STUDY: ICD-10-CM

## 2018-10-08 DIAGNOSIS — I50.22 CHRONIC SYSTOLIC (CONGESTIVE) HEART FAILURE: ICD-10-CM

## 2018-10-08 DIAGNOSIS — J44.9 CHRONIC OBSTRUCTIVE PULMONARY DISEASE, UNSPECIFIED: ICD-10-CM

## 2018-10-09 ENCOUNTER — APPOINTMENT (OUTPATIENT)
Dept: CARDIOTHORACIC SURGERY | Facility: CLINIC | Age: 53
End: 2018-10-09
Payer: MEDICAID

## 2018-10-09 RX ORDER — METOPROLOL TARTRATE 50 MG/1
50 TABLET, FILM COATED ORAL
Qty: 60 | Refills: 3 | Status: ACTIVE | COMMUNITY

## 2018-10-09 RX ORDER — ASPIRIN 81 MG
81 TABLET, DELAYED RELEASE (ENTERIC COATED) ORAL DAILY
Refills: 6 | Status: ACTIVE | COMMUNITY

## 2018-10-09 RX ORDER — OXYCODONE HYDROCHLORIDE AND ACETAMINOPHEN 5; 325 MG/1; MG/1
5-325 TABLET ORAL
Refills: 0 | Status: ACTIVE | COMMUNITY

## 2018-10-09 RX ORDER — ALBUTEROL SULFATE 108 UG/1
108 (90 BASE) AEROSOL, METERED RESPIRATORY (INHALATION)
Refills: 0 | Status: ACTIVE | COMMUNITY

## 2018-10-09 RX ORDER — PANTOPRAZOLE 40 MG/1
40 TABLET, DELAYED RELEASE ORAL DAILY
Refills: 5 | Status: ACTIVE | COMMUNITY

## 2018-10-09 RX ORDER — INSULIN GLARGINE 100 [IU]/ML
100 INJECTION, SOLUTION SUBCUTANEOUS AT BEDTIME
Refills: 0 | Status: ACTIVE | COMMUNITY

## 2018-10-09 RX ORDER — FLUTICASONE PROPIONATE AND SALMETEROL 50; 100 UG/1; UG/1
100-50 POWDER RESPIRATORY (INHALATION)
Refills: 0 | Status: ACTIVE | COMMUNITY

## 2018-10-09 RX ORDER — CLOPIDOGREL BISULFATE 75 MG/1
75 TABLET, FILM COATED ORAL DAILY
Qty: 1 | Refills: 6 | Status: ACTIVE | COMMUNITY

## 2018-10-09 RX ORDER — INSULIN HUMAN 100 [IU]/ML
100 INJECTION, SOLUTION PARENTERAL 3 TIMES DAILY
Refills: 0 | Status: ACTIVE | COMMUNITY

## 2018-10-09 RX ORDER — ATORVASTATIN CALCIUM 80 MG/1
80 TABLET, FILM COATED ORAL
Qty: 1 | Refills: 2 | Status: ACTIVE | COMMUNITY

## 2018-10-09 RX ORDER — PREDNISONE 20 MG/1
20 TABLET ORAL DAILY
Qty: 30 | Refills: 0 | Status: ACTIVE | COMMUNITY

## 2018-10-10 ENCOUNTER — APPOINTMENT (OUTPATIENT)
Dept: CARDIOTHORACIC SURGERY | Facility: CLINIC | Age: 53
End: 2018-10-10
Payer: MEDICAID

## 2018-10-10 VITALS
WEIGHT: 165 LBS | OXYGEN SATURATION: 95 % | RESPIRATION RATE: 20 BRPM | DIASTOLIC BLOOD PRESSURE: 81 MMHG | TEMPERATURE: 97.4 F | SYSTOLIC BLOOD PRESSURE: 150 MMHG | HEART RATE: 95 BPM | BODY MASS INDEX: 26.52 KG/M2

## 2018-10-10 VITALS
BODY MASS INDEX: 26.52 KG/M2 | HEART RATE: 95 BPM | OXYGEN SATURATION: 95 % | TEMPERATURE: 97.4 F | RESPIRATION RATE: 20 BRPM | SYSTOLIC BLOOD PRESSURE: 150 MMHG | WEIGHT: 165 LBS | DIASTOLIC BLOOD PRESSURE: 81 MMHG

## 2018-10-10 PROCEDURE — 99024 POSTOP FOLLOW-UP VISIT: CPT

## 2018-12-26 PROCEDURE — 82553 CREATINE MB FRACTION: CPT

## 2018-12-26 PROCEDURE — 70450 CT HEAD/BRAIN W/O DYE: CPT

## 2018-12-26 PROCEDURE — 94002 VENT MGMT INPAT INIT DAY: CPT

## 2018-12-26 PROCEDURE — 85025 COMPLETE CBC W/AUTO DIFF WBC: CPT

## 2018-12-26 PROCEDURE — 84436 ASSAY OF TOTAL THYROXINE: CPT

## 2018-12-26 PROCEDURE — 84443 ASSAY THYROID STIM HORMONE: CPT

## 2018-12-26 PROCEDURE — 80053 COMPREHEN METABOLIC PANEL: CPT

## 2018-12-26 PROCEDURE — 84300 ASSAY OF URINE SODIUM: CPT

## 2018-12-26 PROCEDURE — 97162 PT EVAL MOD COMPLEX 30 MIN: CPT

## 2018-12-26 PROCEDURE — 83605 ASSAY OF LACTIC ACID: CPT

## 2018-12-26 PROCEDURE — 82550 ASSAY OF CK (CPK): CPT

## 2018-12-26 PROCEDURE — 80048 BASIC METABOLIC PNL TOTAL CA: CPT

## 2018-12-26 PROCEDURE — 85610 PROTHROMBIN TIME: CPT

## 2018-12-26 PROCEDURE — 83930 ASSAY OF BLOOD OSMOLALITY: CPT

## 2018-12-26 PROCEDURE — C1889: CPT

## 2018-12-26 PROCEDURE — P9047: CPT

## 2018-12-26 PROCEDURE — 87449 NOS EACH ORGANISM AG IA: CPT

## 2018-12-26 PROCEDURE — 84132 ASSAY OF SERUM POTASSIUM: CPT

## 2018-12-26 PROCEDURE — 36415 COLL VENOUS BLD VENIPUNCTURE: CPT

## 2018-12-26 PROCEDURE — 83880 ASSAY OF NATRIURETIC PEPTIDE: CPT

## 2018-12-26 PROCEDURE — 85730 THROMBOPLASTIN TIME PARTIAL: CPT

## 2018-12-26 PROCEDURE — 85018 HEMOGLOBIN: CPT

## 2018-12-26 PROCEDURE — 86901 BLOOD TYPING SEROLOGIC RH(D): CPT

## 2018-12-26 PROCEDURE — 82330 ASSAY OF CALCIUM: CPT

## 2018-12-26 PROCEDURE — 86900 BLOOD TYPING SEROLOGIC ABO: CPT

## 2018-12-26 PROCEDURE — 71250 CT THORAX DX C-: CPT

## 2018-12-26 PROCEDURE — C8929: CPT

## 2018-12-26 PROCEDURE — 83935 ASSAY OF URINE OSMOLALITY: CPT

## 2018-12-26 PROCEDURE — S2900: CPT

## 2018-12-26 PROCEDURE — P9045: CPT

## 2018-12-26 PROCEDURE — 86850 RBC ANTIBODY SCREEN: CPT

## 2018-12-26 PROCEDURE — 80061 LIPID PANEL: CPT

## 2018-12-26 PROCEDURE — 71046 X-RAY EXAM CHEST 2 VIEWS: CPT

## 2018-12-26 PROCEDURE — 80202 ASSAY OF VANCOMYCIN: CPT

## 2018-12-26 PROCEDURE — 80076 HEPATIC FUNCTION PANEL: CPT

## 2018-12-26 PROCEDURE — 93005 ELECTROCARDIOGRAM TRACING: CPT

## 2018-12-26 PROCEDURE — 94660 CPAP INITIATION&MGMT: CPT

## 2018-12-26 PROCEDURE — 84133 ASSAY OF URINE POTASSIUM: CPT

## 2018-12-26 PROCEDURE — 94150 VITAL CAPACITY TEST: CPT

## 2018-12-26 PROCEDURE — 84100 ASSAY OF PHOSPHORUS: CPT

## 2018-12-26 PROCEDURE — 82962 GLUCOSE BLOOD TEST: CPT

## 2018-12-26 PROCEDURE — 83036 HEMOGLOBIN GLYCOSYLATED A1C: CPT

## 2018-12-26 PROCEDURE — 87184 SC STD DISK METHOD PER PLATE: CPT

## 2018-12-26 PROCEDURE — 84295 ASSAY OF SERUM SODIUM: CPT

## 2018-12-26 PROCEDURE — 84484 ASSAY OF TROPONIN QUANT: CPT

## 2018-12-26 PROCEDURE — 86923 COMPATIBILITY TEST ELECTRIC: CPT

## 2018-12-26 PROCEDURE — 85027 COMPLETE CBC AUTOMATED: CPT

## 2018-12-26 PROCEDURE — 83735 ASSAY OF MAGNESIUM: CPT

## 2018-12-26 PROCEDURE — 71045 X-RAY EXAM CHEST 1 VIEW: CPT

## 2018-12-26 PROCEDURE — 81001 URINALYSIS AUTO W/SCOPE: CPT

## 2018-12-26 PROCEDURE — 93880 EXTRACRANIAL BILAT STUDY: CPT

## 2018-12-26 PROCEDURE — 82803 BLOOD GASES ANY COMBINATION: CPT

## 2018-12-26 PROCEDURE — 87070 CULTURE OTHR SPECIMN AEROBIC: CPT

## 2018-12-26 PROCEDURE — 90686 IIV4 VACC NO PRSV 0.5 ML IM: CPT

## 2018-12-26 PROCEDURE — 84480 ASSAY TRIIODOTHYRONINE (T3): CPT

## 2018-12-26 PROCEDURE — 94640 AIRWAY INHALATION TREATMENT: CPT

## 2019-01-23 ENCOUNTER — MESSAGE (OUTPATIENT)
Age: 54
End: 2019-01-23

## 2021-03-05 NOTE — PATIENT PROFILE ADULT. - VISION (WITH CORRECTIVE LENSES IF THE PATIENT USUALLY WEARS THEM):
40w3d glasses/Partially impaired: cannot see medication labels or newsprint, but can see obstacles in path, and the surrounding layout; can count fingers at arm's length

## 2024-08-10 NOTE — PHYSICAL THERAPY INITIAL EVALUATION ADULT - ASSISTIVE DEVICE FOR TRANSFER: SIT/STAND, REHAB EVAL

## 2024-09-11 NOTE — PROCEDURE NOTE - NSNEEDLEGAUGE_GEN_A_CORE
Patient's depression is recurrent and is mild without psychosis. Their depression is currently active and the condition is improving with treatment. This will be reassessed at the next regular appointment. F/U as described:patient will continue current medication therapy.     20